# Patient Record
Sex: FEMALE | Race: WHITE | Employment: OTHER | URBAN - METROPOLITAN AREA
[De-identification: names, ages, dates, MRNs, and addresses within clinical notes are randomized per-mention and may not be internally consistent; named-entity substitution may affect disease eponyms.]

---

## 2017-01-13 RX ORDER — AMOXICILLIN 875 MG/1
875 TABLET, COATED ORAL 2 TIMES DAILY
Qty: 20 TABLET | Refills: 0 | Status: SHIPPED | OUTPATIENT
Start: 2017-01-13 | End: 2017-01-23

## 2017-03-08 RX ORDER — FOLIC ACID 1 MG/1
TABLET ORAL
Qty: 180 TABLET | Refills: 0 | Status: SHIPPED | OUTPATIENT
Start: 2017-03-08 | End: 2017-06-05 | Stop reason: SDUPTHER

## 2017-03-08 RX ORDER — LEVOTHYROXINE SODIUM 0.05 MG/1
TABLET ORAL
Qty: 90 TABLET | Refills: 0 | Status: SHIPPED | OUTPATIENT
Start: 2017-03-08 | End: 2017-06-05 | Stop reason: SDUPTHER

## 2017-03-08 RX ORDER — ISOSORBIDE MONONITRATE 30 MG/1
TABLET, EXTENDED RELEASE ORAL
Qty: 30 TABLET | Refills: 6 | Status: SHIPPED | OUTPATIENT
Start: 2017-03-08 | End: 2017-10-02 | Stop reason: SDUPTHER

## 2017-03-16 ENCOUNTER — HOSPITAL ENCOUNTER (OUTPATIENT)
Dept: OTHER | Age: 82
Discharge: HOME | End: 2017-03-16
Attending: FAMILY MEDICINE | Admitting: FAMILY MEDICINE

## 2017-03-16 ENCOUNTER — OFFICE VISIT (OUTPATIENT)
Dept: CARDIOLOGY CLINIC | Age: 82
End: 2017-03-16

## 2017-03-16 VITALS
HEART RATE: 67 BPM | DIASTOLIC BLOOD PRESSURE: 60 MMHG | HEIGHT: 60 IN | BODY MASS INDEX: 28.03 KG/M2 | WEIGHT: 142.8 LBS | SYSTOLIC BLOOD PRESSURE: 118 MMHG

## 2017-03-16 DIAGNOSIS — R06.02 SOB (SHORTNESS OF BREATH): Primary | ICD-10-CM

## 2017-03-16 DIAGNOSIS — R06.09 DYSPNEA ON EXERTION: ICD-10-CM

## 2017-03-16 DIAGNOSIS — E78.2 MIXED HYPERLIPIDEMIA: ICD-10-CM

## 2017-03-16 DIAGNOSIS — I25.10 CORONARY ARTERY DISEASE INVOLVING NATIVE HEART WITHOUT ANGINA PECTORIS, UNSPECIFIED VESSEL OR LESION TYPE: ICD-10-CM

## 2017-03-16 DIAGNOSIS — I10 ESSENTIAL HYPERTENSION: ICD-10-CM

## 2017-03-16 DIAGNOSIS — R06.02 SOB (SHORTNESS OF BREATH): ICD-10-CM

## 2017-03-16 PROCEDURE — 99214 OFFICE O/P EST MOD 30 MIN: CPT | Performed by: INTERNAL MEDICINE

## 2017-03-17 PROBLEM — R06.09 DYSPNEA ON EXERTION: Status: ACTIVE | Noted: 2017-03-17

## 2017-03-18 LAB
B-TYPE NATRIURETIC PEPTIDE: 155 PG/ML
BUN / CREAT RATIO: 22 (CALC) (ref 6–22)
BUN BLDV-MCNC: 29 MG/DL (ref 7–25)
CALCIUM SERPL-MCNC: 9.5 MG/DL (ref 8.6–10.4)
CHLORIDE BLD-SCNC: 106 MMOL/L (ref 98–110)
CO2: 23 MMOL/L (ref 20–31)
COPY TO: NORMAL
CREAT SERPL-MCNC: 1.29 MG/DL (ref 0.6–0.88)
GFR AFRICAN AMERICAN: 44 ML/MIN/1.73M2
GFR SERPL CREATININE-BSD FRML MDRD: 38 ML/MIN/1.73M2
GLUCOSE BLD-MCNC: 109 MG/DL (ref 65–99)
POTASSIUM SERPL-SCNC: 4.5 MMOL/L (ref 3.5–5.3)
SODIUM BLD-SCNC: 139 MMOL/L (ref 135–146)

## 2017-03-27 ENCOUNTER — TELEPHONE (OUTPATIENT)
Dept: CARDIOLOGY CLINIC | Age: 82
End: 2017-03-27

## 2017-04-12 ENCOUNTER — HOSPITAL ENCOUNTER (OUTPATIENT)
Dept: NON INVASIVE DIAGNOSTICS | Age: 82
Discharge: HOME | End: 2017-04-12
Attending: INTERNAL MEDICINE | Admitting: INTERNAL MEDICINE

## 2017-04-12 DIAGNOSIS — R06.02 SHORTNESS OF BREATH: ICD-10-CM

## 2017-04-12 LAB
LV EF: 58 %
LVEF MODALITY: NORMAL

## 2017-04-17 ENCOUNTER — TELEPHONE (OUTPATIENT)
Dept: CARDIOLOGY CLINIC | Age: 82
End: 2017-04-17

## 2017-04-18 ENCOUNTER — TELEPHONE (OUTPATIENT)
Dept: CARDIOLOGY CLINIC | Age: 82
End: 2017-04-18

## 2017-04-18 DIAGNOSIS — R06.02 SOB (SHORTNESS OF BREATH): Primary | ICD-10-CM

## 2017-04-28 ENCOUNTER — HOSPITAL ENCOUNTER (OUTPATIENT)
Dept: PULMONOLOGY | Age: 82
Discharge: HOME | End: 2017-04-28
Attending: INTERNAL MEDICINE | Admitting: INTERNAL MEDICINE

## 2017-04-28 DIAGNOSIS — R06.02 SHORTNESS OF BREATH: ICD-10-CM

## 2017-05-02 ENCOUNTER — OFFICE VISIT (OUTPATIENT)
Dept: PULMONOLOGY | Age: 82
End: 2017-05-02

## 2017-05-02 VITALS
WEIGHT: 143 LBS | SYSTOLIC BLOOD PRESSURE: 124 MMHG | BODY MASS INDEX: 28.07 KG/M2 | HEIGHT: 60 IN | HEART RATE: 79 BPM | RESPIRATION RATE: 16 BRPM | OXYGEN SATURATION: 94 % | DIASTOLIC BLOOD PRESSURE: 64 MMHG

## 2017-05-02 DIAGNOSIS — R93.89 ABNORMAL CXR: ICD-10-CM

## 2017-05-02 DIAGNOSIS — R09.02 EXERCISE HYPOXEMIA: ICD-10-CM

## 2017-05-02 DIAGNOSIS — R06.09 DOE (DYSPNEA ON EXERTION): Primary | ICD-10-CM

## 2017-05-02 PROCEDURE — 99204 OFFICE O/P NEW MOD 45 MIN: CPT | Performed by: INTERNAL MEDICINE

## 2017-05-02 PROCEDURE — 94620 6 MIN WALK TEST: CPT | Performed by: INTERNAL MEDICINE

## 2017-05-10 ENCOUNTER — HOSPITAL ENCOUNTER (OUTPATIENT)
Dept: CT IMAGING | Age: 82
Discharge: HOME | End: 2017-05-10
Attending: INTERNAL MEDICINE | Admitting: INTERNAL MEDICINE

## 2017-05-10 DIAGNOSIS — R93.89 ABNORMAL CXR: ICD-10-CM

## 2017-05-10 DIAGNOSIS — R06.09 DOE (DYSPNEA ON EXERTION): ICD-10-CM

## 2017-05-10 DIAGNOSIS — R06.09 OTHER FORMS OF DYSPNEA: ICD-10-CM

## 2017-06-05 RX ORDER — FOLIC ACID 1 MG/1
TABLET ORAL
Qty: 180 TABLET | Refills: 0 | Status: SHIPPED | OUTPATIENT
Start: 2017-06-05 | End: 2017-09-01 | Stop reason: SDUPTHER

## 2017-06-05 RX ORDER — LEVOTHYROXINE SODIUM 0.05 MG/1
TABLET ORAL
Qty: 90 TABLET | Refills: 0 | Status: SHIPPED | OUTPATIENT
Start: 2017-06-05 | End: 2017-09-01 | Stop reason: SDUPTHER

## 2017-06-08 ENCOUNTER — OFFICE VISIT (OUTPATIENT)
Dept: PULMONOLOGY | Age: 82
End: 2017-06-08

## 2017-06-08 VITALS
SYSTOLIC BLOOD PRESSURE: 116 MMHG | WEIGHT: 139 LBS | HEIGHT: 60 IN | RESPIRATION RATE: 18 BRPM | DIASTOLIC BLOOD PRESSURE: 70 MMHG | BODY MASS INDEX: 27.29 KG/M2

## 2017-06-08 DIAGNOSIS — R09.02 EXERCISE HYPOXEMIA: ICD-10-CM

## 2017-06-08 DIAGNOSIS — J84.112 IPF (IDIOPATHIC PULMONARY FIBROSIS) (HCC): Primary | ICD-10-CM

## 2017-06-08 PROCEDURE — 99214 OFFICE O/P EST MOD 30 MIN: CPT | Performed by: INTERNAL MEDICINE

## 2017-06-12 ENCOUNTER — TELEPHONE (OUTPATIENT)
Dept: FAMILY MEDICINE CLINIC | Age: 82
End: 2017-06-12

## 2017-06-12 DIAGNOSIS — E78.2 MIXED HYPERLIPIDEMIA: ICD-10-CM

## 2017-06-12 DIAGNOSIS — I10 ESSENTIAL HYPERTENSION: ICD-10-CM

## 2017-06-12 DIAGNOSIS — E03.9 HYPOTHYROIDISM, UNSPECIFIED TYPE: Primary | Chronic | ICD-10-CM

## 2017-06-12 PROBLEM — J84.112 IPF (IDIOPATHIC PULMONARY FIBROSIS) (HCC): Status: ACTIVE | Noted: 2017-06-12

## 2017-06-12 PROBLEM — R09.02 EXERCISE HYPOXEMIA: Status: ACTIVE | Noted: 2017-06-12

## 2017-06-15 LAB
A/G RATIO: 1.4 (CALC) (ref 1–2.5)
ALBUMIN SERPL-MCNC: 3.9 G/DL (ref 3.6–5.1)
ALP BLD-CCNC: 81 U/L (ref 33–130)
ALT SERPL-CCNC: 8 U/L (ref 6–29)
AST SERPL-CCNC: 14 U/L (ref 10–35)
BILIRUB SERPL-MCNC: 0.4 MG/DL (ref 0.2–1.2)
BUN / CREAT RATIO: 18 (CALC) (ref 6–22)
BUN BLDV-MCNC: 23 MG/DL (ref 7–25)
CALCIUM SERPL-MCNC: 9.2 MG/DL (ref 8.6–10.4)
CHLORIDE BLD-SCNC: 107 MMOL/L (ref 98–110)
CHOLESTEROL: 124 MG/DL (CALC)
CO2: 24 MMOL/L (ref 20–31)
CREAT SERPL-MCNC: 1.29 MG/DL (ref 0.6–0.88)
GFR AFRICAN AMERICAN: 44 ML/MIN/1.73M2
GFR SERPL CREATININE-BSD FRML MDRD: 38 ML/MIN/1.73M2
GLOBULIN: 2.7 G/DL (CALC) (ref 1.9–3.7)
GLUCOSE BLD-MCNC: 104 MG/DL (ref 65–99)
POTASSIUM SERPL-SCNC: 4.3 MMOL/L (ref 3.5–5.3)
SODIUM BLD-SCNC: 140 MMOL/L (ref 135–146)
TOTAL CK: 54 U/L (ref 29–143)
TOTAL PROTEIN: 6.6 G/DL (ref 6.1–8.1)

## 2017-06-20 ENCOUNTER — TELEPHONE (OUTPATIENT)
Dept: PULMONOLOGY | Age: 82
End: 2017-06-20

## 2017-06-22 ENCOUNTER — OFFICE VISIT (OUTPATIENT)
Dept: FAMILY MEDICINE CLINIC | Age: 82
End: 2017-06-22

## 2017-06-22 VITALS
HEIGHT: 60 IN | HEART RATE: 73 BPM | WEIGHT: 144.2 LBS | SYSTOLIC BLOOD PRESSURE: 120 MMHG | DIASTOLIC BLOOD PRESSURE: 70 MMHG | BODY MASS INDEX: 28.31 KG/M2 | OXYGEN SATURATION: 96 %

## 2017-06-22 DIAGNOSIS — I10 ESSENTIAL HYPERTENSION: ICD-10-CM

## 2017-06-22 DIAGNOSIS — E78.2 MIXED HYPERLIPIDEMIA: ICD-10-CM

## 2017-06-22 DIAGNOSIS — Z23 NEED FOR PROPHYLACTIC VACCINATION AGAINST DIPHTHERIA-TETANUS-PERTUSSIS (DTP): Primary | ICD-10-CM

## 2017-06-22 DIAGNOSIS — J84.112 IPF (IDIOPATHIC PULMONARY FIBROSIS) (HCC): ICD-10-CM

## 2017-06-22 DIAGNOSIS — I25.10 CORONARY ARTERY DISEASE INVOLVING NATIVE HEART WITHOUT ANGINA PECTORIS, UNSPECIFIED VESSEL OR LESION TYPE: ICD-10-CM

## 2017-06-22 PROBLEM — R06.09 DYSPNEA ON EXERTION: Status: RESOLVED | Noted: 2017-03-17 | Resolved: 2017-06-22

## 2017-06-22 PROCEDURE — 99214 OFFICE O/P EST MOD 30 MIN: CPT | Performed by: FAMILY MEDICINE

## 2017-06-22 ASSESSMENT — ENCOUNTER SYMPTOMS
ABDOMINAL PAIN: 0
HEMOPTYSIS: 0
SORE THROAT: 0
ORTHOPNEA: 0
VOMITING: 0
SPUTUM PRODUCTION: 0
SWOLLEN GLANDS: 0
SHORTNESS OF BREATH: 1
WHEEZING: 0
RHINORRHEA: 0

## 2017-07-03 RX ORDER — LOSARTAN POTASSIUM 100 MG/1
TABLET ORAL
Qty: 90 TABLET | Refills: 1 | Status: SHIPPED | OUTPATIENT
Start: 2017-07-03 | End: 2017-12-28 | Stop reason: SDUPTHER

## 2017-07-10 RX ORDER — ROSUVASTATIN CALCIUM 20 MG/1
TABLET, COATED ORAL
Qty: 60 TABLET | Refills: 2 | Status: SHIPPED | OUTPATIENT
Start: 2017-07-10 | End: 2017-11-27 | Stop reason: SDUPTHER

## 2017-08-18 ENCOUNTER — HOSPITAL ENCOUNTER (OUTPATIENT)
Dept: MAMMOGRAPHY | Age: 82
Discharge: HOME | End: 2017-08-18
Attending: FAMILY MEDICINE | Admitting: FAMILY MEDICINE

## 2017-08-18 DIAGNOSIS — Z12.31 VISIT FOR SCREENING MAMMOGRAM: ICD-10-CM

## 2017-08-25 ENCOUNTER — HOSPITAL ENCOUNTER (OUTPATIENT)
Dept: MAMMOGRAPHY | Age: 82
Discharge: HOME | End: 2017-08-25
Attending: FAMILY MEDICINE | Admitting: FAMILY MEDICINE

## 2017-08-25 DIAGNOSIS — R92.8 ABNORMAL FINDING ON MAMMOGRAPHY: ICD-10-CM

## 2017-08-25 DIAGNOSIS — R92.8 ABNORMAL MAMMOGRAM: ICD-10-CM

## 2017-08-29 ENCOUNTER — HOSPITAL ENCOUNTER (OUTPATIENT)
Dept: ULTRASOUND IMAGING | Age: 82
Discharge: HOME | End: 2017-08-29
Attending: FAMILY MEDICINE | Admitting: FAMILY MEDICINE

## 2017-08-29 DIAGNOSIS — R93.89 ABNORMAL ULTRASOUND: ICD-10-CM

## 2017-09-01 RX ORDER — LEVOTHYROXINE SODIUM 0.05 MG/1
TABLET ORAL
Qty: 90 TABLET | Refills: 0 | Status: SHIPPED | OUTPATIENT
Start: 2017-09-01 | End: 2017-12-01 | Stop reason: SDUPTHER

## 2017-09-01 RX ORDER — FOLIC ACID 1 MG/1
TABLET ORAL
Qty: 180 TABLET | Refills: 0 | Status: SHIPPED | OUTPATIENT
Start: 2017-09-01 | End: 2017-12-01 | Stop reason: SDUPTHER

## 2017-09-07 ENCOUNTER — TELEPHONE (OUTPATIENT)
Dept: PULMONOLOGY | Age: 82
End: 2017-09-07

## 2017-09-07 ENCOUNTER — OFFICE VISIT (OUTPATIENT)
Dept: CARDIOLOGY CLINIC | Age: 82
End: 2017-09-07

## 2017-09-07 VITALS
BODY MASS INDEX: 28.51 KG/M2 | DIASTOLIC BLOOD PRESSURE: 74 MMHG | HEART RATE: 76 BPM | WEIGHT: 146 LBS | SYSTOLIC BLOOD PRESSURE: 134 MMHG

## 2017-09-07 DIAGNOSIS — I25.10 CORONARY ARTERY DISEASE INVOLVING NATIVE HEART WITHOUT ANGINA PECTORIS, UNSPECIFIED VESSEL OR LESION TYPE: Primary | ICD-10-CM

## 2017-09-07 DIAGNOSIS — I10 ESSENTIAL HYPERTENSION: ICD-10-CM

## 2017-09-07 PROCEDURE — 99213 OFFICE O/P EST LOW 20 MIN: CPT | Performed by: INTERNAL MEDICINE

## 2017-09-21 ENCOUNTER — TELEPHONE (OUTPATIENT)
Dept: PULMONOLOGY | Age: 82
End: 2017-09-21

## 2017-09-21 DIAGNOSIS — J84.112 IPF (IDIOPATHIC PULMONARY FIBROSIS) (HCC): Primary | ICD-10-CM

## 2017-10-02 NOTE — TELEPHONE ENCOUNTER
Requested Prescriptions     Pending Prescriptions Disp Refills    isosorbide mononitrate (IMDUR) 30 MG extended release tablet [Pharmacy Med Name: ISOSORBIDE MN ER 30 MG TABLET] 90 tablet 5     Sig: TAKE ONE TABLET BY MOUTH DAILY

## 2017-10-05 RX ORDER — ISOSORBIDE MONONITRATE 30 MG/1
TABLET, EXTENDED RELEASE ORAL
Qty: 90 TABLET | Refills: 5 | Status: SHIPPED | OUTPATIENT
Start: 2017-10-05 | End: 2019-01-02 | Stop reason: SDUPTHER

## 2017-10-06 NOTE — TELEPHONE ENCOUNTER
Patient of Dr Ribera Offer;   Please see note below. She is taking Immodium (it comes with the Ofev because of the side effect), due to start of diarrhea.

## 2017-10-09 ENCOUNTER — TELEPHONE (OUTPATIENT)
Dept: PULMONOLOGY | Age: 82
End: 2017-10-09

## 2017-10-17 ENCOUNTER — TELEPHONE (OUTPATIENT)
Dept: FAMILY MEDICINE CLINIC | Age: 82
End: 2017-10-17

## 2017-11-08 NOTE — TELEPHONE ENCOUNTER
Received call from pharmacy--Rx did not go thru fax. They can only receive electronic fax. I routed Rx through [address] [place] [address] Rd.

## 2017-11-14 ENCOUNTER — OFFICE VISIT (OUTPATIENT)
Dept: PULMONOLOGY | Age: 82
End: 2017-11-14

## 2017-11-14 VITALS
DIASTOLIC BLOOD PRESSURE: 68 MMHG | BODY MASS INDEX: 28.07 KG/M2 | HEART RATE: 74 BPM | WEIGHT: 143 LBS | HEIGHT: 60 IN | RESPIRATION RATE: 16 BRPM | OXYGEN SATURATION: 94 % | SYSTOLIC BLOOD PRESSURE: 122 MMHG

## 2017-11-14 DIAGNOSIS — J84.112 IPF (IDIOPATHIC PULMONARY FIBROSIS) (HCC): Primary | ICD-10-CM

## 2017-11-14 PROCEDURE — G8598 ASA/ANTIPLAT THER USED: HCPCS | Performed by: INTERNAL MEDICINE

## 2017-11-14 PROCEDURE — G8484 FLU IMMUNIZE NO ADMIN: HCPCS | Performed by: INTERNAL MEDICINE

## 2017-11-14 PROCEDURE — 4040F PNEUMOC VAC/ADMIN/RCVD: CPT | Performed by: INTERNAL MEDICINE

## 2017-11-14 PROCEDURE — 99214 OFFICE O/P EST MOD 30 MIN: CPT | Performed by: INTERNAL MEDICINE

## 2017-11-14 PROCEDURE — 1123F ACP DISCUSS/DSCN MKR DOCD: CPT | Performed by: INTERNAL MEDICINE

## 2017-11-14 PROCEDURE — G8419 CALC BMI OUT NRM PARAM NOF/U: HCPCS | Performed by: INTERNAL MEDICINE

## 2017-11-14 PROCEDURE — 1036F TOBACCO NON-USER: CPT | Performed by: INTERNAL MEDICINE

## 2017-11-14 PROCEDURE — 1090F PRES/ABSN URINE INCON ASSESS: CPT | Performed by: INTERNAL MEDICINE

## 2017-11-14 PROCEDURE — G8399 PT W/DXA RESULTS DOCUMENT: HCPCS | Performed by: INTERNAL MEDICINE

## 2017-11-14 PROCEDURE — G8427 DOCREV CUR MEDS BY ELIG CLIN: HCPCS | Performed by: INTERNAL MEDICINE

## 2017-11-14 NOTE — PROGRESS NOTES
[facility]    Outpatient Follow Up Note    Subjective:   CHIEF COMPLAINT / HPI:     The patient is 80 y.o. female who presents today for follow up of IPF. She has now been on Ofev for approximately 6-8 weeks. She initially had issues with diarrhea and some nausea and anorexia. I had her stop the Ofev for a week and restart at a lower dose, 150 mg daily instead of twice a day. She eats it with her biggest meal of the day which is lunch. She has done much better and has no side effects. Her dyspnea exertion is unchanged and really is related to her activity level. However she does not have daily dyspnea on exertion. She is not using her portable oxygen with exertion consistently.   She is asking about a portable oxygen concentrator    Past Medical History:    Past Medical History:   Diagnosis Date    Bilateral cataracts 9/17/2015    Cholelithiasis     Coronary artery disease     Hyperlipidemia     Hypertension     Hypothyroid 4/11/2014    IPF (idiopathic pulmonary fibrosis) ([facility] [address] 6/12/2017    Thyroid nodule        Social History:    History   Smoking Status    Former Smoker    Quit date: 8/13/1985   Smokeless Tobacco    Never Used       Current Medications:  Current Outpatient Prescriptions on File Prior to Visit   Medication Sig Dispense Refill    Nintedanib Esylate 150 MG CAPS Take 150 mg by mouth daily 30 capsule 3    isosorbide mononitrate (IMDUR) 30 MG extended release tablet TAKE ONE TABLET BY MOUTH DAILY 90 tablet 5    metoprolol tartrate (LOPRESSOR) 25 MG tablet TAKE ONE AND ONE-HALF (1 & 1/2) TABLET BY MOUTH TWO TIMES A DAY 90 tablet 3    levothyroxine (SYNTHROID) 50 MCG tablet TAKE ONE TABLET BY MOUTH DAILY 90 tablet 0    folic acid (FOLVITE) 1 MG tablet TAKE ONE TABLET BY MOUTH TWICE A  tablet 0    rosuvastatin (CRESTOR) 20 MG tablet TAKE ONE TABLET BY MOUTH ONCE NIGHTLY 60 tablet 2    losartan (COZAAR) 100 MG tablet TAKE ONE TABLET BY calcifications are noted.  No significant pericardial effusion is   appreciated.  Large hiatal hernia is present.       HRCT Findings/Lungs/pleura: Routine images through the lung fields   demonstrate no focal dense consolidation.  High-resolution images through the   lungs demonstrate diffuse moderate bronchiectasis involving upper lobes and   lower lobes and right middle lobe.  There are scattered areas of nonspecific   ground-glass.  Peripheral subpleural reticular lines are noted these are   present bilaterally.  With a lower lobe predominance, there are areas of   honeycomb lung.  Significant pattern of air trapping not appreciated   expiration images.       Upper Abdomen: Limited evaluation without acute finding.       Soft Tissues/Bones: Moderate diffuse degenerative changes are noted about   spine.           Impression   Extensive changes are noted bilaterally suggestive of interstitial lung   disease. Iver Hardin is most suggestive of usual interstitial pneumonitis   pattern.  Clinical correlation and follow-up is recommended.       Heavy coronary artery calcifications are noted.       Large hiatal hernia is present. Last PFTs:  DATE OF STUDY:  04/28/2017     Forced vital capacity mildly reduced expiratory flow rates and FEV1 to FVC  ratio normal.  No change after bronchodilator. Total lung capacity and FRC  moderately reduced, RV severely reduced. Single breath diffusion capacity  moderately reduced.     IMPRESSION:  Mild restrictive ventilatory defect. Findings may be consistent  with a neuromuscular chest wall, pleural or parenchymal disorder. Assessment:     1. IPF (idiopathic pulmonary fibrosis) ([address]  Hepatic Function Panel       Plan:     1. Continue Ofev 150 mg  2. LFTs November 4 were normal.  Next recheck beginning of February  3. I've written an order for a portable oxygen concentrator  4. I've recommended that she get a pulse oximeter and monitor oxygen saturation with exertion.

## 2017-12-15 ENCOUNTER — TELEPHONE (OUTPATIENT)
Dept: PULMONOLOGY | Age: 82
End: 2017-12-15

## 2017-12-15 NOTE — TELEPHONE ENCOUNTER
I have called [facility] to start PA.      Spoke to Thaddeus Pittman to get a continuation of therapy PA for     Ref: [id number]  APPROVED THROUGH 12/31/2018

## 2017-12-18 ENCOUNTER — TELEPHONE (OUTPATIENT)
Dept: PULMONOLOGY | Age: 82
End: 2017-12-18

## 2017-12-26 ENCOUNTER — TELEPHONE (OUTPATIENT)
Dept: FAMILY MEDICINE CLINIC | Age: 82
End: 2017-12-26

## 2017-12-26 NOTE — TELEPHONE ENCOUNTER
[address] says tips of her fingers are turning blue  PT says it's dur to poor circulation  Also having itching allover her body  Breaks out in a rash,  Breaks out in different places  Thinks it's a reaction to one of her meds    Wants to be seen    PT last seen 6/22/17

## 2017-12-27 NOTE — TELEPHONE ENCOUNTER
As long as there is no pain keep appt next week  Verbal instructions and authorization given per Dr. Calin Paulino and notified pt

## 2018-01-03 ENCOUNTER — OFFICE VISIT (OUTPATIENT)
Dept: FAMILY MEDICINE CLINIC | Age: 83
End: 2018-01-03

## 2018-01-03 VITALS
HEIGHT: 60 IN | OXYGEN SATURATION: 95 % | DIASTOLIC BLOOD PRESSURE: 80 MMHG | WEIGHT: 142.7 LBS | HEART RATE: 72 BPM | SYSTOLIC BLOOD PRESSURE: 120 MMHG | BODY MASS INDEX: 28.02 KG/M2

## 2018-01-03 DIAGNOSIS — N18.2 STAGE 2 CHRONIC KIDNEY DISEASE: ICD-10-CM

## 2018-01-03 DIAGNOSIS — L29.9 ITCHING: ICD-10-CM

## 2018-01-03 DIAGNOSIS — E78.2 MIXED HYPERLIPIDEMIA: ICD-10-CM

## 2018-01-03 DIAGNOSIS — J84.112 IPF (IDIOPATHIC PULMONARY FIBROSIS) (HCC): ICD-10-CM

## 2018-01-03 LAB
ANION GAP SERPL CALCULATED.3IONS-SCNC: 15 MMOL/L (ref 3–16)
BASOPHILS ABSOLUTE: 0.1 K/UL (ref 0–0.2)
BASOPHILS RELATIVE PERCENT: 1.1 %
BUN BLDV-MCNC: 19 MG/DL (ref 7–20)
CALCIUM SERPL-MCNC: 9.9 MG/DL (ref 8.3–10.6)
CHLORIDE BLD-SCNC: 101 MMOL/L (ref 99–110)
CO2: 25 MMOL/L (ref 21–32)
CREAT SERPL-MCNC: 1.1 MG/DL (ref 0.6–1.2)
EOSINOPHILS ABSOLUTE: 0.3 K/UL (ref 0–0.6)
EOSINOPHILS RELATIVE PERCENT: 4 %
GFR AFRICAN AMERICAN: 57
GFR NON-AFRICAN AMERICAN: 47
GLUCOSE BLD-MCNC: 89 MG/DL (ref 70–99)
HCT VFR BLD CALC: 38.9 % (ref 36–48)
HEMOGLOBIN: 12.8 G/DL (ref 12–16)
LYMPHOCYTES ABSOLUTE: 1.3 K/UL (ref 1–5.1)
LYMPHOCYTES RELATIVE PERCENT: 17 %
MCH RBC QN AUTO: 30.9 PG (ref 26–34)
MCHC RBC AUTO-ENTMCNC: 32.9 G/DL (ref 31–36)
MCV RBC AUTO: 94.1 FL (ref 80–100)
MONOCYTES ABSOLUTE: 0.6 K/UL (ref 0–1.3)
MONOCYTES RELATIVE PERCENT: 8.2 %
NEUTROPHILS ABSOLUTE: 5.3 K/UL (ref 1.7–7.7)
NEUTROPHILS RELATIVE PERCENT: 69.7 %
PDW BLD-RTO: 14.6 % (ref 12.4–15.4)
PLATELET # BLD: 318 K/UL (ref 135–450)
PMV BLD AUTO: 8.5 FL (ref 5–10.5)
POTASSIUM SERPL-SCNC: 5.2 MMOL/L (ref 3.5–5.1)
RBC # BLD: 4.13 M/UL (ref 4–5.2)
SODIUM BLD-SCNC: 141 MMOL/L (ref 136–145)
WBC # BLD: 7.7 K/UL (ref 4–11)

## 2018-01-03 PROCEDURE — 4040F PNEUMOC VAC/ADMIN/RCVD: CPT | Performed by: FAMILY MEDICINE

## 2018-01-03 PROCEDURE — G8510 SCR DEP NEG, NO PLAN REQD: HCPCS | Performed by: FAMILY MEDICINE

## 2018-01-03 PROCEDURE — G8598 ASA/ANTIPLAT THER USED: HCPCS | Performed by: FAMILY MEDICINE

## 2018-01-03 PROCEDURE — 1123F ACP DISCUSS/DSCN MKR DOCD: CPT | Performed by: FAMILY MEDICINE

## 2018-01-03 PROCEDURE — G8419 CALC BMI OUT NRM PARAM NOF/U: HCPCS | Performed by: FAMILY MEDICINE

## 2018-01-03 PROCEDURE — G8484 FLU IMMUNIZE NO ADMIN: HCPCS | Performed by: FAMILY MEDICINE

## 2018-01-03 PROCEDURE — G8399 PT W/DXA RESULTS DOCUMENT: HCPCS | Performed by: FAMILY MEDICINE

## 2018-01-03 PROCEDURE — 99213 OFFICE O/P EST LOW 20 MIN: CPT | Performed by: FAMILY MEDICINE

## 2018-01-03 PROCEDURE — G8427 DOCREV CUR MEDS BY ELIG CLIN: HCPCS | Performed by: FAMILY MEDICINE

## 2018-01-03 PROCEDURE — 3288F FALL RISK ASSESSMENT DOCD: CPT | Performed by: FAMILY MEDICINE

## 2018-01-03 PROCEDURE — 1090F PRES/ABSN URINE INCON ASSESS: CPT | Performed by: FAMILY MEDICINE

## 2018-01-03 PROCEDURE — 1036F TOBACCO NON-USER: CPT | Performed by: FAMILY MEDICINE

## 2018-01-03 ASSESSMENT — ENCOUNTER SYMPTOMS
SHORTNESS OF BREATH: 1
SORE THROAT: 0
RHINORRHEA: 0
DIARRHEA: 0
VOMITING: 0
NAIL CHANGES: 0
COUGH: 0
EYE PAIN: 0

## 2018-01-03 ASSESSMENT — PATIENT HEALTH QUESTIONNAIRE - PHQ9
1. LITTLE INTEREST OR PLEASURE IN DOING THINGS: 0
SUM OF ALL RESPONSES TO PHQ QUESTIONS 1-9: 0
2. FEELING DOWN, DEPRESSED OR HOPELESS: 0
SUM OF ALL RESPONSES TO PHQ9 QUESTIONS 1 & 2: 0

## 2018-01-19 ENCOUNTER — TELEPHONE (OUTPATIENT)
Dept: FAMILY MEDICINE CLINIC | Age: 83
End: 2018-01-19

## 2018-01-23 ENCOUNTER — TELEPHONE (OUTPATIENT)
Dept: FAMILY MEDICINE CLINIC | Age: 83
End: 2018-01-23

## 2018-01-26 ENCOUNTER — OFFICE VISIT (OUTPATIENT)
Dept: FAMILY MEDICINE CLINIC | Age: 83
End: 2018-01-26

## 2018-01-26 VITALS
SYSTOLIC BLOOD PRESSURE: 120 MMHG | BODY MASS INDEX: 28.11 KG/M2 | DIASTOLIC BLOOD PRESSURE: 80 MMHG | HEART RATE: 73 BPM | HEIGHT: 60 IN | WEIGHT: 143.2 LBS | OXYGEN SATURATION: 96 %

## 2018-01-26 DIAGNOSIS — L29.9 ITCHING: ICD-10-CM

## 2018-01-26 PROCEDURE — G8419 CALC BMI OUT NRM PARAM NOF/U: HCPCS | Performed by: FAMILY MEDICINE

## 2018-01-26 PROCEDURE — G8598 ASA/ANTIPLAT THER USED: HCPCS | Performed by: FAMILY MEDICINE

## 2018-01-26 PROCEDURE — G8399 PT W/DXA RESULTS DOCUMENT: HCPCS | Performed by: FAMILY MEDICINE

## 2018-01-26 PROCEDURE — 1090F PRES/ABSN URINE INCON ASSESS: CPT | Performed by: FAMILY MEDICINE

## 2018-01-26 PROCEDURE — G8484 FLU IMMUNIZE NO ADMIN: HCPCS | Performed by: FAMILY MEDICINE

## 2018-01-26 PROCEDURE — 1036F TOBACCO NON-USER: CPT | Performed by: FAMILY MEDICINE

## 2018-01-26 PROCEDURE — 1123F ACP DISCUSS/DSCN MKR DOCD: CPT | Performed by: FAMILY MEDICINE

## 2018-01-26 PROCEDURE — 99213 OFFICE O/P EST LOW 20 MIN: CPT | Performed by: FAMILY MEDICINE

## 2018-01-26 PROCEDURE — G8427 DOCREV CUR MEDS BY ELIG CLIN: HCPCS | Performed by: FAMILY MEDICINE

## 2018-01-26 PROCEDURE — 4040F PNEUMOC VAC/ADMIN/RCVD: CPT | Performed by: FAMILY MEDICINE

## 2018-01-26 ASSESSMENT — ENCOUNTER SYMPTOMS
EYE PAIN: 0
DIARRHEA: 0
VOMITING: 0
SHORTNESS OF BREATH: 0
COUGH: 0
SORE THROAT: 0
RHINORRHEA: 0
NAIL CHANGES: 0

## 2018-01-26 NOTE — PROGRESS NOTES
Subjective:      Patient ID: Elena Tolentino is a 80 y.o. female. Rash   This is a chronic problem. The current episode started 1 to 4 weeks ago. The problem is unchanged. The rash is diffuse. Rash characteristics: ? meds. Pertinent negatives include no anorexia, congestion, cough, diarrhea, eye pain, facial edema, fatigue, fever, joint pain, nail changes, rhinorrhea, shortness of breath, sore throat or vomiting. Past treatments include nothing. The treatment provided no relief. There is no history of allergies, asthma, eczema or varicella. Review of Systems   Constitutional: Negative for fatigue and fever. HENT: Negative for congestion, rhinorrhea and sore throat. Eyes: Negative for pain. Respiratory: Negative for cough and shortness of breath. Gastrointestinal: Negative for anorexia, diarrhea and vomiting. Musculoskeletal: Negative for joint pain. Skin: Positive for rash. Negative for nail changes. Objective:   Physical Exam   Constitutional: She is oriented to person, place, and time. She appears well-developed and well-nourished. No distress. HENT:   Mouth/Throat: Oropharynx is clear and moist.   Eyes: Conjunctivae are normal. Pupils are equal, round, and reactive to light. Neck: No JVD present. No tracheal deviation present. No thyromegaly present. Cardiovascular: Normal rate, regular rhythm, normal heart sounds and intact distal pulses. Exam reveals no gallop. No murmur heard. Pulmonary/Chest: Effort normal and breath sounds normal. No stridor. No respiratory distress. She has no wheezes. She has no rales. She exhibits no tenderness. Abdominal: Soft. Bowel sounds are normal. She exhibits no distension and no mass. There is no tenderness. Musculoskeletal: She exhibits no edema or tenderness. Lymphadenopathy:     She has no cervical adenopathy. Neurological: She is alert and oriented to person, place, and time. She displays normal reflexes. No cranial nerve deficit.

## 2018-02-15 ENCOUNTER — OFFICE VISIT (OUTPATIENT)
Dept: PULMONOLOGY | Age: 83
End: 2018-02-15

## 2018-02-15 VITALS
DIASTOLIC BLOOD PRESSURE: 72 MMHG | WEIGHT: 141 LBS | SYSTOLIC BLOOD PRESSURE: 120 MMHG | OXYGEN SATURATION: 95 % | RESPIRATION RATE: 16 BRPM | HEART RATE: 72 BPM | BODY MASS INDEX: 27.68 KG/M2 | HEIGHT: 60 IN

## 2018-02-15 DIAGNOSIS — R09.02 EXERCISE HYPOXEMIA: ICD-10-CM

## 2018-02-15 DIAGNOSIS — Z51.81 THERAPEUTIC DRUG MONITORING: ICD-10-CM

## 2018-02-15 DIAGNOSIS — J84.112 IPF (IDIOPATHIC PULMONARY FIBROSIS) (HCC): Primary | ICD-10-CM

## 2018-02-15 PROCEDURE — G8399 PT W/DXA RESULTS DOCUMENT: HCPCS | Performed by: INTERNAL MEDICINE

## 2018-02-15 PROCEDURE — 1036F TOBACCO NON-USER: CPT | Performed by: INTERNAL MEDICINE

## 2018-02-15 PROCEDURE — G8419 CALC BMI OUT NRM PARAM NOF/U: HCPCS | Performed by: INTERNAL MEDICINE

## 2018-02-15 PROCEDURE — G8484 FLU IMMUNIZE NO ADMIN: HCPCS | Performed by: INTERNAL MEDICINE

## 2018-02-15 PROCEDURE — 4040F PNEUMOC VAC/ADMIN/RCVD: CPT | Performed by: INTERNAL MEDICINE

## 2018-02-15 PROCEDURE — G8598 ASA/ANTIPLAT THER USED: HCPCS | Performed by: INTERNAL MEDICINE

## 2018-02-15 PROCEDURE — G8427 DOCREV CUR MEDS BY ELIG CLIN: HCPCS | Performed by: INTERNAL MEDICINE

## 2018-02-15 PROCEDURE — 1123F ACP DISCUSS/DSCN MKR DOCD: CPT | Performed by: INTERNAL MEDICINE

## 2018-02-15 PROCEDURE — 1090F PRES/ABSN URINE INCON ASSESS: CPT | Performed by: INTERNAL MEDICINE

## 2018-02-15 PROCEDURE — 99214 OFFICE O/P EST MOD 30 MIN: CPT | Performed by: INTERNAL MEDICINE

## 2018-03-22 ENCOUNTER — OFFICE VISIT (OUTPATIENT)
Dept: CARDIOLOGY CLINIC | Age: 83
End: 2018-03-22

## 2018-03-22 VITALS
OXYGEN SATURATION: 93 % | DIASTOLIC BLOOD PRESSURE: 80 MMHG | SYSTOLIC BLOOD PRESSURE: 140 MMHG | BODY MASS INDEX: 27.54 KG/M2 | HEART RATE: 72 BPM | WEIGHT: 141 LBS

## 2018-03-22 DIAGNOSIS — E78.5 HYPERLIPIDEMIA, UNSPECIFIED HYPERLIPIDEMIA TYPE: ICD-10-CM

## 2018-03-22 DIAGNOSIS — R06.02 SOB (SHORTNESS OF BREATH): ICD-10-CM

## 2018-03-22 DIAGNOSIS — J84.9 INTERSTITIAL LUNG DISEASE (HCC): ICD-10-CM

## 2018-03-22 DIAGNOSIS — I10 ESSENTIAL HYPERTENSION: Primary | ICD-10-CM

## 2018-03-22 DIAGNOSIS — I25.10 CAD IN NATIVE ARTERY: ICD-10-CM

## 2018-03-22 PROCEDURE — G8482 FLU IMMUNIZE ORDER/ADMIN: HCPCS | Performed by: INTERNAL MEDICINE

## 2018-03-22 PROCEDURE — 1123F ACP DISCUSS/DSCN MKR DOCD: CPT | Performed by: INTERNAL MEDICINE

## 2018-03-22 PROCEDURE — 99214 OFFICE O/P EST MOD 30 MIN: CPT | Performed by: INTERNAL MEDICINE

## 2018-03-22 PROCEDURE — G8427 DOCREV CUR MEDS BY ELIG CLIN: HCPCS | Performed by: INTERNAL MEDICINE

## 2018-03-22 PROCEDURE — 4040F PNEUMOC VAC/ADMIN/RCVD: CPT | Performed by: INTERNAL MEDICINE

## 2018-03-22 PROCEDURE — G8419 CALC BMI OUT NRM PARAM NOF/U: HCPCS | Performed by: INTERNAL MEDICINE

## 2018-03-22 PROCEDURE — G8399 PT W/DXA RESULTS DOCUMENT: HCPCS | Performed by: INTERNAL MEDICINE

## 2018-03-22 PROCEDURE — 1036F TOBACCO NON-USER: CPT | Performed by: INTERNAL MEDICINE

## 2018-03-22 PROCEDURE — G8598 ASA/ANTIPLAT THER USED: HCPCS | Performed by: INTERNAL MEDICINE

## 2018-03-22 PROCEDURE — 1090F PRES/ABSN URINE INCON ASSESS: CPT | Performed by: INTERNAL MEDICINE

## 2018-03-22 RX ORDER — NITROGLYCERIN 0.4 MG/1
0.4 TABLET SUBLINGUAL EVERY 5 MIN PRN
Qty: 25 TABLET | Refills: 3 | Status: SHIPPED | OUTPATIENT
Start: 2018-03-22 | End: 2019-10-10 | Stop reason: SDUPTHER

## 2018-03-22 ASSESSMENT — ENCOUNTER SYMPTOMS
COUGH: 0
CHEST TIGHTNESS: 0
SHORTNESS OF BREATH: 1
CHOKING: 0

## 2018-03-22 NOTE — PROGRESS NOTES
Negative for agitation, behavioral problems and confusion. All other systems reviewed and are negative. Objective:   Physical Exam   Constitutional: She is oriented to person, place, and time. She appears well-developed and well-nourished. No distress. HENT:   Head: Normocephalic and atraumatic. Eyes: Conjunctivae and EOM are normal. Right eye exhibits no discharge. Left eye exhibits no discharge. Neck: Normal range of motion. No JVD present. Cardiovascular: Normal rate, regular rhythm, S1 normal, S2 normal and normal heart sounds. Exam reveals no gallop. No murmur heard. Pulses:       Radial pulses are 2+ on the right side, and 2+ on the left side. Pulmonary/Chest: Effort normal. No respiratory distress. She has decreased breath sounds. She has no wheezes. She has no rales. Abdominal: Soft. Bowel sounds are normal. She exhibits no distension. There is no tenderness. Musculoskeletal: Normal range of motion. She exhibits no edema. Neurological: She is alert and oriented to person, place, and time. Skin: Skin is warm and dry. Psychiatric: She has a normal mood and affect. Her behavior is normal. Thought content normal.         Assessment:      1. Essential hypertension  nitroGLYCERIN (NITROSTAT) 0.4 MG SL tablet   2. Hyperlipidemia, unspecified hyperlipidemia type  nitroGLYCERIN (NITROSTAT) 0.4 MG SL tablet   3. CAD in native artery     4. Interstitial lung disease ([address]     5. SOB (shortness of breath)             Plan:      Sx appear stable. Sob with some activities but unchanged. Left arm pain isolated. Not recurring . No chest pain. Lipids per PCP. Reviewed previous records and testing. No changes. Continue to monitor. F/U 6 months.

## 2018-05-10 ENCOUNTER — OFFICE VISIT (OUTPATIENT)
Dept: PULMONOLOGY | Age: 83
End: 2018-05-10

## 2018-05-10 ENCOUNTER — TELEPHONE (OUTPATIENT)
Dept: FAMILY MEDICINE CLINIC | Age: 83
End: 2018-05-10

## 2018-05-10 VITALS
DIASTOLIC BLOOD PRESSURE: 78 MMHG | WEIGHT: 137 LBS | RESPIRATION RATE: 16 BRPM | HEIGHT: 60 IN | HEART RATE: 65 BPM | OXYGEN SATURATION: 94 % | SYSTOLIC BLOOD PRESSURE: 130 MMHG | BODY MASS INDEX: 26.9 KG/M2

## 2018-05-10 DIAGNOSIS — R09.02 EXERCISE HYPOXEMIA: ICD-10-CM

## 2018-05-10 DIAGNOSIS — J84.112 IPF (IDIOPATHIC PULMONARY FIBROSIS) (HCC): Primary | ICD-10-CM

## 2018-05-10 PROCEDURE — 99214 OFFICE O/P EST MOD 30 MIN: CPT | Performed by: INTERNAL MEDICINE

## 2018-05-10 PROCEDURE — 4040F PNEUMOC VAC/ADMIN/RCVD: CPT | Performed by: INTERNAL MEDICINE

## 2018-05-10 PROCEDURE — 1036F TOBACCO NON-USER: CPT | Performed by: INTERNAL MEDICINE

## 2018-05-10 PROCEDURE — G8598 ASA/ANTIPLAT THER USED: HCPCS | Performed by: INTERNAL MEDICINE

## 2018-05-10 PROCEDURE — 1090F PRES/ABSN URINE INCON ASSESS: CPT | Performed by: INTERNAL MEDICINE

## 2018-05-10 PROCEDURE — G8419 CALC BMI OUT NRM PARAM NOF/U: HCPCS | Performed by: INTERNAL MEDICINE

## 2018-05-10 PROCEDURE — 1123F ACP DISCUSS/DSCN MKR DOCD: CPT | Performed by: INTERNAL MEDICINE

## 2018-05-10 PROCEDURE — G8427 DOCREV CUR MEDS BY ELIG CLIN: HCPCS | Performed by: INTERNAL MEDICINE

## 2018-05-10 PROCEDURE — G8399 PT W/DXA RESULTS DOCUMENT: HCPCS | Performed by: INTERNAL MEDICINE

## 2018-06-05 ENCOUNTER — OFFICE VISIT (OUTPATIENT)
Dept: FAMILY MEDICINE CLINIC | Age: 83
End: 2018-06-05

## 2018-06-05 VITALS
HEIGHT: 60 IN | BODY MASS INDEX: 27.03 KG/M2 | SYSTOLIC BLOOD PRESSURE: 120 MMHG | DIASTOLIC BLOOD PRESSURE: 80 MMHG | WEIGHT: 137.7 LBS | HEART RATE: 68 BPM | OXYGEN SATURATION: 95 %

## 2018-06-05 DIAGNOSIS — E78.5 HYPERLIPIDEMIA, UNSPECIFIED HYPERLIPIDEMIA TYPE: ICD-10-CM

## 2018-06-05 DIAGNOSIS — Z23 NEED FOR TDAP VACCINATION: ICD-10-CM

## 2018-06-05 DIAGNOSIS — I25.10 CORONARY ARTERY DISEASE INVOLVING NATIVE HEART WITHOUT ANGINA PECTORIS, UNSPECIFIED VESSEL OR LESION TYPE: ICD-10-CM

## 2018-06-05 DIAGNOSIS — Z23 NEED FOR SHINGLES VACCINE: ICD-10-CM

## 2018-06-05 DIAGNOSIS — L29.9 ITCHING: ICD-10-CM

## 2018-06-05 DIAGNOSIS — J84.112 IPF (IDIOPATHIC PULMONARY FIBROSIS) (HCC): ICD-10-CM

## 2018-06-05 DIAGNOSIS — I10 ESSENTIAL HYPERTENSION: Primary | ICD-10-CM

## 2018-06-05 DIAGNOSIS — E03.9 HYPOTHYROIDISM, UNSPECIFIED TYPE: Chronic | ICD-10-CM

## 2018-06-05 DIAGNOSIS — N18.2 STAGE 2 CHRONIC KIDNEY DISEASE: ICD-10-CM

## 2018-06-05 PROCEDURE — 4040F PNEUMOC VAC/ADMIN/RCVD: CPT | Performed by: FAMILY MEDICINE

## 2018-06-05 PROCEDURE — G8598 ASA/ANTIPLAT THER USED: HCPCS | Performed by: FAMILY MEDICINE

## 2018-06-05 PROCEDURE — 1123F ACP DISCUSS/DSCN MKR DOCD: CPT | Performed by: FAMILY MEDICINE

## 2018-06-05 PROCEDURE — G8419 CALC BMI OUT NRM PARAM NOF/U: HCPCS | Performed by: FAMILY MEDICINE

## 2018-06-05 PROCEDURE — 1090F PRES/ABSN URINE INCON ASSESS: CPT | Performed by: FAMILY MEDICINE

## 2018-06-05 PROCEDURE — 99214 OFFICE O/P EST MOD 30 MIN: CPT | Performed by: FAMILY MEDICINE

## 2018-06-05 PROCEDURE — G8427 DOCREV CUR MEDS BY ELIG CLIN: HCPCS | Performed by: FAMILY MEDICINE

## 2018-06-05 PROCEDURE — 1036F TOBACCO NON-USER: CPT | Performed by: FAMILY MEDICINE

## 2018-06-05 PROCEDURE — G8399 PT W/DXA RESULTS DOCUMENT: HCPCS | Performed by: FAMILY MEDICINE

## 2018-06-05 ASSESSMENT — ENCOUNTER SYMPTOMS
CHOKING: 0
ABDOMINAL DISTENTION: 0
NAUSEA: 0
DIARRHEA: 0
CHEST TIGHTNESS: 0
SINUS PRESSURE: 0
TROUBLE SWALLOWING: 0
ANAL BLEEDING: 0
EYE ITCHING: 0
SHORTNESS OF BREATH: 0
EYE REDNESS: 0
VOICE CHANGE: 0
SORE THROAT: 0
ABDOMINAL PAIN: 0
WHEEZING: 0
FACIAL SWELLING: 0
BLOOD IN STOOL: 0
BACK PAIN: 0
RHINORRHEA: 0
EYE DISCHARGE: 0
COUGH: 0
APNEA: 0
VOMITING: 0
STRIDOR: 0
COLOR CHANGE: 0
CONSTIPATION: 0
EYE PAIN: 0
RECTAL PAIN: 0
PHOTOPHOBIA: 0

## 2018-06-13 LAB
A/G RATIO: 1.2 (CALC) (ref 1–2.5)
ALBUMIN SERPL-MCNC: 3.8 G/DL (ref 3.6–5.1)
ALP BLD-CCNC: 82 U/L (ref 33–130)
ALT SERPL-CCNC: 8 U/L (ref 6–29)
AST SERPL-CCNC: 13 U/L (ref 10–35)
ATYPICAL LYMPHOCYTE RELATIVE PERCENT: NORMAL % (ref 0–10)
BAND NEUTROPHILS: NORMAL %
BANDED NEUTROPHILS ABSOLUTE COUNT: NORMAL CELLS/UL (ref 0–750)
BASOPHILS ABSOLUTE: 48 CELLS/UL (ref 0–200)
BASOPHILS RELATIVE PERCENT: 0.7 %
BILIRUB SERPL-MCNC: 0.4 MG/DL (ref 0.2–1.2)
BLASTS ABSOLUTE COUNT: NORMAL CELLS/UL
BLASTS RELATIVE PERCENT: NORMAL %
BUN / CREAT RATIO: 18 (CALC) (ref 6–22)
BUN BLDV-MCNC: 22 MG/DL (ref 7–25)
CALCIUM SERPL-MCNC: 9.6 MG/DL (ref 8.6–10.4)
CHLORIDE BLD-SCNC: 105 MMOL/L (ref 98–110)
CHOLESTEROL: 124 MG/DL (CALC)
CO2: 24 MMOL/L (ref 20–31)
COMMENT: NORMAL
COPY TO: NORMAL
CREAT SERPL-MCNC: 1.21 MG/DL (ref 0.6–0.88)
EOSINOPHILS ABSOLUTE: 320 CELLS/UL (ref 15–500)
EOSINOPHILS RELATIVE PERCENT: 4.7 %
GFR AFRICAN AMERICAN: 48 ML/MIN/1.73M2
GFR SERPL CREATININE-BSD FRML MDRD: 41 ML/MIN/1.73M2
GLOBULIN: 3.1 G/DL (CALC) (ref 1.9–3.7)
GLUCOSE BLD-MCNC: 105 MG/DL (ref 65–99)
HCT VFR BLD CALC: 38.2 % (ref 35–45)
HEMOGLOBIN: 12.7 G/DL (ref 11.7–15.5)
LYMPHOCYTES ABSOLUTE: 979 CELLS/UL (ref 850–3900)
LYMPHOCYTES RELATIVE PERCENT: 14.4 %
MCH RBC QN AUTO: 30.6 PG (ref 27–33)
MCHC RBC AUTO-ENTMCNC: 33.2 G/DL (ref 32–36)
MCV RBC AUTO: 92 FL (ref 80–100)
METAMYELOCYTES ABSOLUTE COUNT: NORMAL CELLS/UL
METAMYELOCYTES RELATIVE PERCENT: NORMAL %
MONOCYTES ABSOLUTE: 388 CELLS/UL (ref 200–950)
MONOCYTES RELATIVE PERCENT: 5.7 %
MYELOCYTE PERCENT: NORMAL %
MYELOCYTES ABSOLUTE COUNT: NORMAL CELLS/UL
NEUTROPHILS ABSOLUTE: 5066 CELLS/UL (ref 1500–7800)
NUCLEATED RED BLOOD CELLS: NORMAL /100 WBC
NUCLEATED RED BLOOD CELLS: NORMAL CELLS/UL
PDW BLD-RTO: 13.5 % (ref 11–15)
PLATELET # BLD: 268 THOUSAND/UL (ref 140–400)
PMV BLD AUTO: 9.5 FL (ref 7.5–12.5)
POTASSIUM SERPL-SCNC: 4.5 MMOL/L (ref 3.5–5.3)
PROMYELOCYTES ABSOLUTE COUNT: NORMAL CELLS/UL
PROMYELOCYTES PERCENT: NORMAL %
RBC # BLD: 4.15 MILLION/UL (ref 3.8–5.1)
SEGMENTED NEUTROPHILS RELATIVE PERCENT: 74.5 %
SODIUM BLD-SCNC: 139 MMOL/L (ref 135–146)
TOTAL CK: 42 U/L (ref 29–143)
TOTAL PROTEIN: 6.9 G/DL (ref 6.1–8.1)
TSH ULTRASENSITIVE: 1.25 MIU/L (ref 0.4–4.5)
WBC # BLD: 6.8 THOUSAND/UL (ref 3.8–10.8)

## 2018-08-27 ENCOUNTER — TELEPHONE (OUTPATIENT)
Dept: FAMILY MEDICINE CLINIC | Age: 83
End: 2018-08-27

## 2018-08-27 NOTE — TELEPHONE ENCOUNTER
Patient is calling to get an appt for today patient is experiencing rash on breast right side with some itching and redness and its spreading x 1 day off and on.           Please advice

## 2018-08-28 ENCOUNTER — OFFICE VISIT (OUTPATIENT)
Dept: FAMILY MEDICINE CLINIC | Age: 83
End: 2018-08-28

## 2018-08-28 VITALS
WEIGHT: 130.2 LBS | DIASTOLIC BLOOD PRESSURE: 70 MMHG | SYSTOLIC BLOOD PRESSURE: 110 MMHG | HEIGHT: 60 IN | BODY MASS INDEX: 25.56 KG/M2 | OXYGEN SATURATION: 95 % | HEART RATE: 75 BPM

## 2018-08-28 DIAGNOSIS — B35.4 TINEA CORPORIS: ICD-10-CM

## 2018-08-28 PROCEDURE — G8419 CALC BMI OUT NRM PARAM NOF/U: HCPCS | Performed by: FAMILY MEDICINE

## 2018-08-28 PROCEDURE — 4040F PNEUMOC VAC/ADMIN/RCVD: CPT | Performed by: FAMILY MEDICINE

## 2018-08-28 PROCEDURE — G8399 PT W/DXA RESULTS DOCUMENT: HCPCS | Performed by: FAMILY MEDICINE

## 2018-08-28 PROCEDURE — 99213 OFFICE O/P EST LOW 20 MIN: CPT | Performed by: FAMILY MEDICINE

## 2018-08-28 PROCEDURE — G8598 ASA/ANTIPLAT THER USED: HCPCS | Performed by: FAMILY MEDICINE

## 2018-08-28 PROCEDURE — G8427 DOCREV CUR MEDS BY ELIG CLIN: HCPCS | Performed by: FAMILY MEDICINE

## 2018-08-28 PROCEDURE — 1090F PRES/ABSN URINE INCON ASSESS: CPT | Performed by: FAMILY MEDICINE

## 2018-08-28 PROCEDURE — 1123F ACP DISCUSS/DSCN MKR DOCD: CPT | Performed by: FAMILY MEDICINE

## 2018-08-28 PROCEDURE — 1036F TOBACCO NON-USER: CPT | Performed by: FAMILY MEDICINE

## 2018-08-28 PROCEDURE — 1101F PT FALLS ASSESS-DOCD LE1/YR: CPT | Performed by: FAMILY MEDICINE

## 2018-08-28 RX ORDER — KETOCONAZOLE 20 MG/G
CREAM TOPICAL
Qty: 30 G | Refills: 1 | Status: SHIPPED | OUTPATIENT
Start: 2018-08-28

## 2018-08-28 ASSESSMENT — ENCOUNTER SYMPTOMS
SHORTNESS OF BREATH: 0
COUGH: 0
VOMITING: 0
EYE PAIN: 0
SORE THROAT: 0
DIARRHEA: 0
NAIL CHANGES: 0
RHINORRHEA: 0

## 2018-08-28 NOTE — PROGRESS NOTES
6/12/2017    Thyroid nodule        Past Surgical History:   Procedure Laterality Date    CHOLECYSTECTOMY, LAPAROSCOPIC  8/14/2012    MULTIPORT ROBOTIC ASSISTED LAPAROSCOPIC CHOLECYSTECTOMY WITH    CORONARY ANGIOPLASTY WITH STENT PLACEMENT  07/2003    THYROIDECTOMY, PARTIAL  11/85       Social History     Social History    Marital status: Single     Spouse name: N/A    Number of children: 0    Years of education: N/A     Occupational History    office for [employer]      Social History Main Topics    Smoking status: Former Smoker     Quit date: 8/13/1985    Smokeless tobacco: Never Used    Alcohol use Yes      Comment: Socially    Drug use: No    Sexual activity: Not Currently     Other Topics Concern    Not on file     Social History Narrative    Lives with room mate    Retired    Travels and golf       Family History   Problem Relation Age of Onset    Coronary Art Dis Mother     Stroke Mother     Other Mother         gallstones    Coronary Art Dis Father     Heart Disease Father     Prostate Cancer Brother     High Cholesterol Brother     Other Brother         gallstones    Cancer Brother         prostate    Heart Disease Brother     Other Brother         gallstones    Cancer Brother         prostate       Immunization History   Administered Date(s) Administered    Influenza A (H1N1) Vaccine IM 12/22/2009    Influenza Vaccine, unspecified formulation 10/24/2016    Influenza, High Dose 10/02/2017    Pneumococcal 13-valent Conjugate ([id number]) 01/06/2015    Pneumococcal Polysaccharide ([id number]) 07/07/2004    Zoster Subunit (Shingrix) 06/14/2018       Review of Systems  Review of Systems   Constitutional: Negative for fatigue and fever. HENT: Negative for congestion, rhinorrhea and sore throat. Eyes: Negative for pain. Respiratory: Negative for cough and shortness of breath. Gastrointestinal: Negative for anorexia, diarrhea and vomiting.    Musculoskeletal: Negative for joint pain. Skin: Positive for rash. Negative for nail changes. Objective:   Physical Exam  Physical Exam   Skin: There is erythema.    Under both breasts        Assessment and Plan:     Tinea corporis  Trial of nizoral

## 2018-08-30 ENCOUNTER — OFFICE VISIT (OUTPATIENT)
Dept: PULMONOLOGY | Age: 83
End: 2018-08-30

## 2018-08-30 VITALS
SYSTOLIC BLOOD PRESSURE: 114 MMHG | WEIGHT: 130.6 LBS | OXYGEN SATURATION: 10 % | DIASTOLIC BLOOD PRESSURE: 66 MMHG | HEART RATE: 64 BPM | HEIGHT: 60 IN | BODY MASS INDEX: 25.64 KG/M2

## 2018-08-30 DIAGNOSIS — R63.0 ANOREXIA: ICD-10-CM

## 2018-08-30 DIAGNOSIS — J84.112 IPF (IDIOPATHIC PULMONARY FIBROSIS) (HCC): Primary | ICD-10-CM

## 2018-08-30 DIAGNOSIS — R63.4 WEIGHT LOSS: ICD-10-CM

## 2018-08-30 PROCEDURE — 99214 OFFICE O/P EST MOD 30 MIN: CPT | Performed by: INTERNAL MEDICINE

## 2018-08-30 PROCEDURE — 1090F PRES/ABSN URINE INCON ASSESS: CPT | Performed by: INTERNAL MEDICINE

## 2018-08-30 PROCEDURE — G8427 DOCREV CUR MEDS BY ELIG CLIN: HCPCS | Performed by: INTERNAL MEDICINE

## 2018-08-30 PROCEDURE — 1101F PT FALLS ASSESS-DOCD LE1/YR: CPT | Performed by: INTERNAL MEDICINE

## 2018-08-30 PROCEDURE — G8419 CALC BMI OUT NRM PARAM NOF/U: HCPCS | Performed by: INTERNAL MEDICINE

## 2018-08-30 PROCEDURE — 1036F TOBACCO NON-USER: CPT | Performed by: INTERNAL MEDICINE

## 2018-08-30 PROCEDURE — G8598 ASA/ANTIPLAT THER USED: HCPCS | Performed by: INTERNAL MEDICINE

## 2018-08-30 PROCEDURE — G8399 PT W/DXA RESULTS DOCUMENT: HCPCS | Performed by: INTERNAL MEDICINE

## 2018-08-30 PROCEDURE — 1123F ACP DISCUSS/DSCN MKR DOCD: CPT | Performed by: INTERNAL MEDICINE

## 2018-08-30 PROCEDURE — 4040F PNEUMOC VAC/ADMIN/RCVD: CPT | Performed by: INTERNAL MEDICINE

## 2018-08-30 NOTE — PROGRESS NOTES
check LFTs in the future only when clinically needed  3. She has a portable oxygen concentrator  4. She has a pulse oximeter and monitors oxygen saturation with exertion. She knows the goal oxygen saturation is 88% or higher  5. She is current up to date with her Prevnar 15, and Pneumovax  6.   Return to my office in 3 months or sooner if needed

## 2018-10-01 ENCOUNTER — TELEPHONE (OUTPATIENT)
Dept: PULMONOLOGY | Age: 83
End: 2018-10-01

## 2018-10-02 ENCOUNTER — OFFICE VISIT (OUTPATIENT)
Dept: CARDIOLOGY CLINIC | Age: 83
End: 2018-10-02
Payer: MEDICARE

## 2018-10-02 VITALS
WEIGHT: 126.6 LBS | BODY MASS INDEX: 24.85 KG/M2 | HEIGHT: 60 IN | OXYGEN SATURATION: 96 % | DIASTOLIC BLOOD PRESSURE: 70 MMHG | SYSTOLIC BLOOD PRESSURE: 110 MMHG | HEART RATE: 72 BPM

## 2018-10-02 DIAGNOSIS — I25.10 CAD IN NATIVE ARTERY: ICD-10-CM

## 2018-10-02 DIAGNOSIS — R06.02 SOB (SHORTNESS OF BREATH): ICD-10-CM

## 2018-10-02 DIAGNOSIS — J84.9 INTERSTITIAL LUNG DISEASE (HCC): ICD-10-CM

## 2018-10-02 DIAGNOSIS — E78.5 HYPERLIPIDEMIA, UNSPECIFIED HYPERLIPIDEMIA TYPE: ICD-10-CM

## 2018-10-02 DIAGNOSIS — I10 ESSENTIAL HYPERTENSION: Primary | ICD-10-CM

## 2018-10-02 PROCEDURE — G8420 CALC BMI NORM PARAMETERS: HCPCS | Performed by: INTERNAL MEDICINE

## 2018-10-02 PROCEDURE — 1090F PRES/ABSN URINE INCON ASSESS: CPT | Performed by: INTERNAL MEDICINE

## 2018-10-02 PROCEDURE — G8399 PT W/DXA RESULTS DOCUMENT: HCPCS | Performed by: INTERNAL MEDICINE

## 2018-10-02 PROCEDURE — 1101F PT FALLS ASSESS-DOCD LE1/YR: CPT | Performed by: INTERNAL MEDICINE

## 2018-10-02 PROCEDURE — G8598 ASA/ANTIPLAT THER USED: HCPCS | Performed by: INTERNAL MEDICINE

## 2018-10-02 PROCEDURE — G8484 FLU IMMUNIZE NO ADMIN: HCPCS | Performed by: INTERNAL MEDICINE

## 2018-10-02 PROCEDURE — 1036F TOBACCO NON-USER: CPT | Performed by: INTERNAL MEDICINE

## 2018-10-02 PROCEDURE — G8427 DOCREV CUR MEDS BY ELIG CLIN: HCPCS | Performed by: INTERNAL MEDICINE

## 2018-10-02 PROCEDURE — 1123F ACP DISCUSS/DSCN MKR DOCD: CPT | Performed by: INTERNAL MEDICINE

## 2018-10-02 PROCEDURE — 4040F PNEUMOC VAC/ADMIN/RCVD: CPT | Performed by: INTERNAL MEDICINE

## 2018-10-02 PROCEDURE — 99214 OFFICE O/P EST MOD 30 MIN: CPT | Performed by: INTERNAL MEDICINE

## 2018-10-02 ASSESSMENT — ENCOUNTER SYMPTOMS
CHOKING: 0
SHORTNESS OF BREATH: 1
CHEST TIGHTNESS: 0
COUGH: 0

## 2018-10-19 ENCOUNTER — TELEPHONE (OUTPATIENT)
Dept: PULMONOLOGY | Age: 83
End: 2018-10-19

## 2018-10-19 DIAGNOSIS — R63.0 ANOREXIA: Primary | ICD-10-CM

## 2018-10-19 NOTE — TELEPHONE ENCOUNTER
Called and talked to Hector Armas, she takes 1 tab of Loperamide daily, has taken more when Diarrhea is really bad though, she is not sure of dosage though.

## 2018-10-19 NOTE — TELEPHONE ENCOUNTER
Alessandra Costa called to say that Susie Bain is still losing weight and having diarrhea, she is still taking Ofev. Please Advise.

## 2018-10-22 RX ORDER — DRONABINOL 2.5 MG/1
2.5 CAPSULE ORAL
Qty: 60 CAPSULE | Refills: 3 | Status: SHIPPED | OUTPATIENT
Start: 2018-10-22 | End: 2018-11-21

## 2018-10-29 ENCOUNTER — TELEPHONE (OUTPATIENT)
Dept: PULMONOLOGY | Age: 83
End: 2018-10-29

## 2018-10-29 NOTE — TELEPHONE ENCOUNTER
Called and talked to Addison Lopes, gave her instr. To stop Ofev for 4 weeks, then call and let us know how she is doing.

## 2018-10-29 NOTE — TELEPHONE ENCOUNTER
Called to say that the Marinoll 2.5 mg has not helped she is still losing weight and feels even more neseaed than before taking. Wondering if she can stop Ofev for awhile to see if that will help. Please Advise.

## 2018-12-02 RX ORDER — LEVOTHYROXINE SODIUM 0.05 MG/1
TABLET ORAL
Qty: 90 TABLET | Refills: 2 | Status: SHIPPED | OUTPATIENT
Start: 2018-12-02 | End: 2019-08-28 | Stop reason: SDUPTHER

## 2018-12-02 RX ORDER — FOLIC ACID 1 MG/1
TABLET ORAL
Qty: 180 TABLET | Refills: 2 | Status: SHIPPED | OUTPATIENT
Start: 2018-12-02 | End: 2019-08-28 | Stop reason: SDUPTHER

## 2018-12-02 RX ORDER — ROSUVASTATIN CALCIUM 20 MG/1
TABLET, COATED ORAL
Qty: 60 TABLET | Refills: 2 | Status: SHIPPED | OUTPATIENT
Start: 2018-12-02 | End: 2019-04-26 | Stop reason: SDUPTHER

## 2018-12-12 ENCOUNTER — TELEPHONE (OUTPATIENT)
Dept: PHARMACY | Facility: CLINIC | Age: 83
End: 2018-12-12

## 2018-12-13 ENCOUNTER — OFFICE VISIT (OUTPATIENT)
Dept: FAMILY MEDICINE CLINIC | Age: 83
End: 2018-12-13
Payer: MEDICARE

## 2018-12-13 ENCOUNTER — TELEPHONE (OUTPATIENT)
Dept: PULMONOLOGY | Age: 83
End: 2018-12-13

## 2018-12-13 ENCOUNTER — OFFICE VISIT (OUTPATIENT)
Dept: PULMONOLOGY | Age: 83
End: 2018-12-13
Payer: MEDICARE

## 2018-12-13 VITALS
RESPIRATION RATE: 16 BRPM | HEIGHT: 61 IN | DIASTOLIC BLOOD PRESSURE: 70 MMHG | SYSTOLIC BLOOD PRESSURE: 118 MMHG | OXYGEN SATURATION: 96 % | WEIGHT: 122.6 LBS | HEART RATE: 57 BPM | BODY MASS INDEX: 23.15 KG/M2

## 2018-12-13 VITALS
HEIGHT: 60 IN | HEART RATE: 64 BPM | SYSTOLIC BLOOD PRESSURE: 102 MMHG | RESPIRATION RATE: 16 BRPM | DIASTOLIC BLOOD PRESSURE: 60 MMHG | WEIGHT: 122 LBS | BODY MASS INDEX: 23.95 KG/M2 | OXYGEN SATURATION: 96 %

## 2018-12-13 DIAGNOSIS — R63.4 WEIGHT LOSS: ICD-10-CM

## 2018-12-13 DIAGNOSIS — I10 ESSENTIAL HYPERTENSION: ICD-10-CM

## 2018-12-13 DIAGNOSIS — I25.10 CORONARY ARTERY DISEASE INVOLVING NATIVE HEART WITHOUT ANGINA PECTORIS, UNSPECIFIED VESSEL OR LESION TYPE: ICD-10-CM

## 2018-12-13 DIAGNOSIS — R19.7 DIARRHEA, UNSPECIFIED TYPE: ICD-10-CM

## 2018-12-13 DIAGNOSIS — E03.9 HYPOTHYROIDISM, UNSPECIFIED TYPE: Chronic | ICD-10-CM

## 2018-12-13 DIAGNOSIS — J84.112 IPF (IDIOPATHIC PULMONARY FIBROSIS) (HCC): ICD-10-CM

## 2018-12-13 DIAGNOSIS — N18.30 CHRONIC KIDNEY DISEASE, STAGE III (MODERATE) (HCC): ICD-10-CM

## 2018-12-13 DIAGNOSIS — J84.112 IPF (IDIOPATHIC PULMONARY FIBROSIS) (HCC): Primary | ICD-10-CM

## 2018-12-13 DIAGNOSIS — R63.0 ANOREXIA: ICD-10-CM

## 2018-12-13 DIAGNOSIS — N18.2 STAGE 2 CHRONIC KIDNEY DISEASE: ICD-10-CM

## 2018-12-13 PROCEDURE — 1090F PRES/ABSN URINE INCON ASSESS: CPT | Performed by: INTERNAL MEDICINE

## 2018-12-13 PROCEDURE — G8420 CALC BMI NORM PARAMETERS: HCPCS | Performed by: FAMILY MEDICINE

## 2018-12-13 PROCEDURE — 4040F PNEUMOC VAC/ADMIN/RCVD: CPT | Performed by: FAMILY MEDICINE

## 2018-12-13 PROCEDURE — G8427 DOCREV CUR MEDS BY ELIG CLIN: HCPCS | Performed by: INTERNAL MEDICINE

## 2018-12-13 PROCEDURE — G8598 ASA/ANTIPLAT THER USED: HCPCS | Performed by: INTERNAL MEDICINE

## 2018-12-13 PROCEDURE — G8420 CALC BMI NORM PARAMETERS: HCPCS | Performed by: INTERNAL MEDICINE

## 2018-12-13 PROCEDURE — G8598 ASA/ANTIPLAT THER USED: HCPCS | Performed by: FAMILY MEDICINE

## 2018-12-13 PROCEDURE — 1101F PT FALLS ASSESS-DOCD LE1/YR: CPT | Performed by: INTERNAL MEDICINE

## 2018-12-13 PROCEDURE — G8482 FLU IMMUNIZE ORDER/ADMIN: HCPCS | Performed by: INTERNAL MEDICINE

## 2018-12-13 PROCEDURE — 99214 OFFICE O/P EST MOD 30 MIN: CPT | Performed by: INTERNAL MEDICINE

## 2018-12-13 PROCEDURE — 1101F PT FALLS ASSESS-DOCD LE1/YR: CPT | Performed by: FAMILY MEDICINE

## 2018-12-13 PROCEDURE — 1036F TOBACCO NON-USER: CPT | Performed by: FAMILY MEDICINE

## 2018-12-13 PROCEDURE — G8482 FLU IMMUNIZE ORDER/ADMIN: HCPCS | Performed by: FAMILY MEDICINE

## 2018-12-13 PROCEDURE — 99214 OFFICE O/P EST MOD 30 MIN: CPT | Performed by: FAMILY MEDICINE

## 2018-12-13 PROCEDURE — 4040F PNEUMOC VAC/ADMIN/RCVD: CPT | Performed by: INTERNAL MEDICINE

## 2018-12-13 PROCEDURE — 1123F ACP DISCUSS/DSCN MKR DOCD: CPT | Performed by: INTERNAL MEDICINE

## 2018-12-13 PROCEDURE — 1090F PRES/ABSN URINE INCON ASSESS: CPT | Performed by: FAMILY MEDICINE

## 2018-12-13 PROCEDURE — 1123F ACP DISCUSS/DSCN MKR DOCD: CPT | Performed by: FAMILY MEDICINE

## 2018-12-13 PROCEDURE — G8427 DOCREV CUR MEDS BY ELIG CLIN: HCPCS | Performed by: FAMILY MEDICINE

## 2018-12-13 PROCEDURE — 1036F TOBACCO NON-USER: CPT | Performed by: INTERNAL MEDICINE

## 2018-12-13 PROCEDURE — G8399 PT W/DXA RESULTS DOCUMENT: HCPCS | Performed by: INTERNAL MEDICINE

## 2018-12-13 PROCEDURE — G8399 PT W/DXA RESULTS DOCUMENT: HCPCS | Performed by: FAMILY MEDICINE

## 2018-12-13 ASSESSMENT — ENCOUNTER SYMPTOMS
COUGH: 0
ABDOMINAL PAIN: 0
BLURRED VISION: 0
NAUSEA: 0
CHANGE IN BOWEL HABIT: 0
VOMITING: 0
SHORTNESS OF BREATH: 0
SORE THROAT: 0
SWOLLEN GLANDS: 0
VISUAL CHANGE: 0
ORTHOPNEA: 0

## 2018-12-13 NOTE — PROGRESS NOTES
pericardial effusion is   appreciated.  Large hiatal hernia is present.       HRCT Findings/Lungs/pleura: Routine images through the lung fields   demonstrate no focal dense consolidation.  High-resolution images through the   lungs demonstrate diffuse moderate bronchiectasis involving upper lobes and   lower lobes and right middle lobe.  There are scattered areas of nonspecific   ground-glass.  Peripheral subpleural reticular lines are noted these are   present bilaterally.  With a lower lobe predominance, there are areas of   honeycomb lung.  Significant pattern of air trapping not appreciated   expiration images.       Upper Abdomen: Limited evaluation without acute finding.       Soft Tissues/Bones: Moderate diffuse degenerative changes are noted about   spine.           Impression   Extensive changes are noted bilaterally suggestive of interstitial lung   disease. Jeniffer Spire is most suggestive of usual interstitial pneumonitis   pattern.  Clinical correlation and follow-up is recommended.       Heavy coronary artery calcifications are noted.       Large hiatal hernia is present. Last PFTs:  DATE OF STUDY:  04/28/2017     Forced vital capacity mildly reduced expiratory flow rates and FEV1 to FVC  ratio normal.  No change after bronchodilator. Total lung capacity and FRC  moderately reduced, RV severely reduced. Single breath diffusion capacity  moderately reduced.     IMPRESSION:  Mild restrictive ventilatory defect. Findings may be consistent  with a neuromuscular chest wall, pleural or parenchymal disorder. Assessment:      Diagnosis Orders   1. IPF (idiopathic pulmonary fibrosis) ([address]     2. Diarrhea, unspecified type     3. Anorexia     4. Weight loss         Plan:     1. Stop Ofev due to intolerance due to diarrhea, anorexia, and weight loss unresponsive to reduced dose of Ofev, Imodium, and Marinol.  Alternatives were discussed with Sisi Angela including changing to Pirfenidone or stopping therapy altogether for IPF. She will think about it  2. Mela Rubio will follow up in regards to her nocturnal pulse oximetry study that was performed but not resulted  3. She has a portable oxygen concentrator  4. She has a pulse oximeter and monitors oxygen saturation with exertion. She knows the goal oxygen saturation is 88% or higher  5. She is current up to date with her Prevnar 15, influenza vaccination and Pneumovax  6.   Return to my office in 3 months or sooner if needed

## 2018-12-13 NOTE — TELEPHONE ENCOUNTER
Second time in attempting to reach patient to review.  Left message asking for return call to direct (office) [phone] or toll free [phone] option 7.     Chiara Neri, 2019 PharmD Candidate

## 2018-12-13 NOTE — PROGRESS NOTES
Disease  Stable with current plan    Essential hypertension  labs    Hypothyroidism  Stable with current plan    IPF (idiopathic pulmonary fibrosis) ([facility] [address]  Off meds because of SE and wants to DC new one too    CKD (chronic kidney disease)  Stable with current plan

## 2018-12-28 RX ORDER — LOSARTAN POTASSIUM 100 MG/1
TABLET ORAL
Qty: 90 TABLET | Refills: 2 | Status: SHIPPED | OUTPATIENT
Start: 2018-12-28 | End: 2019-01-07 | Stop reason: ALTCHOICE

## 2019-01-03 RX ORDER — ISOSORBIDE MONONITRATE 30 MG/1
TABLET, EXTENDED RELEASE ORAL
Qty: 90 TABLET | Refills: 3 | Status: SHIPPED | OUTPATIENT
Start: 2019-01-03 | End: 2019-12-23

## 2019-01-07 ENCOUNTER — TELEPHONE (OUTPATIENT)
Dept: FAMILY MEDICINE CLINIC | Age: 84
End: 2019-01-07

## 2019-01-07 RX ORDER — OLMESARTAN MEDOXOMIL 20 MG/1
20 TABLET ORAL DAILY
Qty: 30 TABLET | Refills: 3 | Status: SHIPPED | OUTPATIENT
Start: 2019-01-07 | End: 2019-02-04 | Stop reason: ALTCHOICE

## 2019-01-17 DIAGNOSIS — J84.112 IPF (IDIOPATHIC PULMONARY FIBROSIS) (HCC): ICD-10-CM

## 2019-02-04 ENCOUNTER — TELEPHONE (OUTPATIENT)
Dept: FAMILY MEDICINE CLINIC | Age: 84
End: 2019-02-04

## 2019-02-04 RX ORDER — IRBESARTAN 75 MG/1
75 TABLET ORAL DAILY
Qty: 30 TABLET | Refills: 5 | Status: SHIPPED | OUTPATIENT
Start: 2019-02-04 | End: 2019-06-04 | Stop reason: SDUPTHER

## 2019-02-15 ENCOUNTER — TELEPHONE (OUTPATIENT)
Dept: PULMONOLOGY | Age: 84
End: 2019-02-15

## 2019-03-13 ENCOUNTER — TELEPHONE (OUTPATIENT)
Dept: FAMILY MEDICINE CLINIC | Age: 84
End: 2019-03-13

## 2019-03-13 DIAGNOSIS — I10 ESSENTIAL HYPERTENSION: Primary | ICD-10-CM

## 2019-03-26 ENCOUNTER — OFFICE VISIT (OUTPATIENT)
Dept: PULMONOLOGY | Age: 84
End: 2019-03-26
Payer: MEDICARE

## 2019-03-26 ENCOUNTER — OFFICE VISIT (OUTPATIENT)
Dept: FAMILY MEDICINE CLINIC | Age: 84
End: 2019-03-26
Payer: MEDICARE

## 2019-03-26 VITALS
HEART RATE: 76 BPM | DIASTOLIC BLOOD PRESSURE: 80 MMHG | OXYGEN SATURATION: 97 % | SYSTOLIC BLOOD PRESSURE: 100 MMHG | BODY MASS INDEX: 24.74 KG/M2 | WEIGHT: 126 LBS | HEIGHT: 60 IN

## 2019-03-26 VITALS
BODY MASS INDEX: 24.85 KG/M2 | OXYGEN SATURATION: 96 % | WEIGHT: 126.6 LBS | SYSTOLIC BLOOD PRESSURE: 110 MMHG | HEART RATE: 58 BPM | HEIGHT: 60 IN | DIASTOLIC BLOOD PRESSURE: 70 MMHG

## 2019-03-26 DIAGNOSIS — J84.112 IPF (IDIOPATHIC PULMONARY FIBROSIS) (HCC): ICD-10-CM

## 2019-03-26 DIAGNOSIS — N18.30 CHRONIC KIDNEY DISEASE, STAGE III (MODERATE) (HCC): ICD-10-CM

## 2019-03-26 DIAGNOSIS — E03.9 HYPOTHYROIDISM, UNSPECIFIED TYPE: Primary | ICD-10-CM

## 2019-03-26 DIAGNOSIS — I10 ESSENTIAL HYPERTENSION: ICD-10-CM

## 2019-03-26 DIAGNOSIS — E78.2 MIXED HYPERLIPIDEMIA: ICD-10-CM

## 2019-03-26 DIAGNOSIS — J84.112 IPF (IDIOPATHIC PULMONARY FIBROSIS) (HCC): Primary | ICD-10-CM

## 2019-03-26 LAB
A/G RATIO: 1.2 (ref 1.1–2.2)
ALBUMIN SERPL-MCNC: 3.9 G/DL (ref 3.4–5)
ALP BLD-CCNC: 92 U/L (ref 40–129)
ALT SERPL-CCNC: 7 U/L (ref 10–40)
ANION GAP SERPL CALCULATED.3IONS-SCNC: 12 MMOL/L (ref 3–16)
AST SERPL-CCNC: 15 U/L (ref 15–37)
BASOPHILS ABSOLUTE: 0.1 K/UL (ref 0–0.2)
BASOPHILS RELATIVE PERCENT: 1.3 %
BILIRUB SERPL-MCNC: <0.2 MG/DL (ref 0–1)
BUN BLDV-MCNC: 25 MG/DL (ref 7–20)
CALCIUM SERPL-MCNC: 9.6 MG/DL (ref 8.3–10.6)
CHLORIDE BLD-SCNC: 103 MMOL/L (ref 99–110)
CO2: 24 MMOL/L (ref 21–32)
CREAT SERPL-MCNC: 1.2 MG/DL (ref 0.6–1.2)
EOSINOPHILS ABSOLUTE: 0.3 K/UL (ref 0–0.6)
EOSINOPHILS RELATIVE PERCENT: 4.8 %
GFR AFRICAN AMERICAN: 52
GFR NON-AFRICAN AMERICAN: 43
GLOBULIN: 3.2 G/DL
GLUCOSE BLD-MCNC: 105 MG/DL (ref 70–99)
HCT VFR BLD CALC: 38.1 % (ref 36–48)
HEMOGLOBIN: 12.4 G/DL (ref 12–16)
LYMPHOCYTES ABSOLUTE: 1.2 K/UL (ref 1–5.1)
LYMPHOCYTES RELATIVE PERCENT: 16.8 %
MCH RBC QN AUTO: 30.3 PG (ref 26–34)
MCHC RBC AUTO-ENTMCNC: 32.5 G/DL (ref 31–36)
MCV RBC AUTO: 93.2 FL (ref 80–100)
MONOCYTES ABSOLUTE: 0.5 K/UL (ref 0–1.3)
MONOCYTES RELATIVE PERCENT: 6.5 %
NEUTROPHILS ABSOLUTE: 5.1 K/UL (ref 1.7–7.7)
NEUTROPHILS RELATIVE PERCENT: 70.6 %
PDW BLD-RTO: 14.4 % (ref 12.4–15.4)
PLATELET # BLD: 301 K/UL (ref 135–450)
PMV BLD AUTO: 7.9 FL (ref 5–10.5)
POTASSIUM SERPL-SCNC: 4.7 MMOL/L (ref 3.5–5.1)
RBC # BLD: 4.09 M/UL (ref 4–5.2)
SODIUM BLD-SCNC: 139 MMOL/L (ref 136–145)
TOTAL PROTEIN: 7.1 G/DL (ref 6.4–8.2)
WBC # BLD: 7.3 K/UL (ref 4–11)

## 2019-03-26 PROCEDURE — 1036F TOBACCO NON-USER: CPT | Performed by: FAMILY MEDICINE

## 2019-03-26 PROCEDURE — G8420 CALC BMI NORM PARAMETERS: HCPCS | Performed by: INTERNAL MEDICINE

## 2019-03-26 PROCEDURE — 1090F PRES/ABSN URINE INCON ASSESS: CPT | Performed by: FAMILY MEDICINE

## 2019-03-26 PROCEDURE — 1036F TOBACCO NON-USER: CPT | Performed by: INTERNAL MEDICINE

## 2019-03-26 PROCEDURE — 99214 OFFICE O/P EST MOD 30 MIN: CPT | Performed by: FAMILY MEDICINE

## 2019-03-26 PROCEDURE — G8420 CALC BMI NORM PARAMETERS: HCPCS | Performed by: FAMILY MEDICINE

## 2019-03-26 PROCEDURE — G8482 FLU IMMUNIZE ORDER/ADMIN: HCPCS | Performed by: INTERNAL MEDICINE

## 2019-03-26 PROCEDURE — G8598 ASA/ANTIPLAT THER USED: HCPCS | Performed by: INTERNAL MEDICINE

## 2019-03-26 PROCEDURE — G8598 ASA/ANTIPLAT THER USED: HCPCS | Performed by: FAMILY MEDICINE

## 2019-03-26 PROCEDURE — G8482 FLU IMMUNIZE ORDER/ADMIN: HCPCS | Performed by: FAMILY MEDICINE

## 2019-03-26 PROCEDURE — G8427 DOCREV CUR MEDS BY ELIG CLIN: HCPCS | Performed by: FAMILY MEDICINE

## 2019-03-26 PROCEDURE — 99214 OFFICE O/P EST MOD 30 MIN: CPT | Performed by: INTERNAL MEDICINE

## 2019-03-26 PROCEDURE — G8399 PT W/DXA RESULTS DOCUMENT: HCPCS | Performed by: INTERNAL MEDICINE

## 2019-03-26 PROCEDURE — 1101F PT FALLS ASSESS-DOCD LE1/YR: CPT | Performed by: FAMILY MEDICINE

## 2019-03-26 PROCEDURE — G8399 PT W/DXA RESULTS DOCUMENT: HCPCS | Performed by: FAMILY MEDICINE

## 2019-03-26 PROCEDURE — 1123F ACP DISCUSS/DSCN MKR DOCD: CPT | Performed by: INTERNAL MEDICINE

## 2019-03-26 PROCEDURE — 1123F ACP DISCUSS/DSCN MKR DOCD: CPT | Performed by: FAMILY MEDICINE

## 2019-03-26 PROCEDURE — G8427 DOCREV CUR MEDS BY ELIG CLIN: HCPCS | Performed by: INTERNAL MEDICINE

## 2019-03-26 PROCEDURE — 1101F PT FALLS ASSESS-DOCD LE1/YR: CPT | Performed by: INTERNAL MEDICINE

## 2019-03-26 PROCEDURE — 4040F PNEUMOC VAC/ADMIN/RCVD: CPT | Performed by: FAMILY MEDICINE

## 2019-03-26 PROCEDURE — 1090F PRES/ABSN URINE INCON ASSESS: CPT | Performed by: INTERNAL MEDICINE

## 2019-03-26 PROCEDURE — 4040F PNEUMOC VAC/ADMIN/RCVD: CPT | Performed by: INTERNAL MEDICINE

## 2019-03-26 ASSESSMENT — ENCOUNTER SYMPTOMS
CONSTIPATION: 0
CHOKING: 0
NAUSEA: 0
APNEA: 0
EYE PAIN: 0
DIARRHEA: 0
PHOTOPHOBIA: 0
SINUS PRESSURE: 0
ABDOMINAL PAIN: 0
EYE REDNESS: 0
EYE ITCHING: 0
WHEEZING: 0
COLOR CHANGE: 0
SORE THROAT: 0
RECTAL PAIN: 0
RHINORRHEA: 0
BACK PAIN: 0
VOMITING: 0
FACIAL SWELLING: 0
STRIDOR: 0
COUGH: 0
ABDOMINAL DISTENTION: 0
CHEST TIGHTNESS: 0
TROUBLE SWALLOWING: 0
ANAL BLEEDING: 0
EYE DISCHARGE: 0
SHORTNESS OF BREATH: 0
BLOOD IN STOOL: 0
VOICE CHANGE: 0

## 2019-03-26 ASSESSMENT — PATIENT HEALTH QUESTIONNAIRE - PHQ9
2. FEELING DOWN, DEPRESSED OR HOPELESS: 0
SUM OF ALL RESPONSES TO PHQ QUESTIONS 1-9: 0
SUM OF ALL RESPONSES TO PHQ QUESTIONS 1-9: 0
1. LITTLE INTEREST OR PLEASURE IN DOING THINGS: 0
SUM OF ALL RESPONSES TO PHQ9 QUESTIONS 1 & 2: 0

## 2019-03-27 DIAGNOSIS — E03.9 HYPOTHYROIDISM, UNSPECIFIED TYPE: ICD-10-CM

## 2019-03-27 DIAGNOSIS — E78.2 MIXED HYPERLIPIDEMIA: ICD-10-CM

## 2019-03-27 LAB
CHOLESTEROL, TOTAL: 194 MG/DL (ref 0–199)
HDLC SERPL-MCNC: 65 MG/DL (ref 40–60)
LDL CHOLESTEROL CALCULATED: 109 MG/DL
TRIGL SERPL-MCNC: 98 MG/DL (ref 0–150)
TSH SERPL DL<=0.05 MIU/L-ACNC: 3.86 UIU/ML (ref 0.27–4.2)
VLDLC SERPL CALC-MCNC: 20 MG/DL

## 2019-04-04 ENCOUNTER — HOSPITAL ENCOUNTER (OUTPATIENT)
Age: 84
Discharge: HOME | End: 2019-04-04
Payer: MEDICARE

## 2019-04-04 ENCOUNTER — HOSPITAL ENCOUNTER (OUTPATIENT)
Dept: GENERAL RADIOLOGY | Age: 84
Discharge: HOME | End: 2019-04-04
Payer: MEDICARE

## 2019-04-04 ENCOUNTER — OFFICE VISIT (OUTPATIENT)
Dept: CARDIOLOGY CLINIC | Age: 84
End: 2019-04-04
Payer: MEDICARE

## 2019-04-04 VITALS
HEART RATE: 72 BPM | DIASTOLIC BLOOD PRESSURE: 70 MMHG | WEIGHT: 129 LBS | BODY MASS INDEX: 25.19 KG/M2 | SYSTOLIC BLOOD PRESSURE: 128 MMHG

## 2019-04-04 DIAGNOSIS — E78.5 HYPERLIPIDEMIA, UNSPECIFIED HYPERLIPIDEMIA TYPE: ICD-10-CM

## 2019-04-04 DIAGNOSIS — J84.9 INTERSTITIAL LUNG DISEASE (HCC): ICD-10-CM

## 2019-04-04 DIAGNOSIS — I10 ESSENTIAL HYPERTENSION: Primary | ICD-10-CM

## 2019-04-04 DIAGNOSIS — J84.112 IPF (IDIOPATHIC PULMONARY FIBROSIS) (HCC): ICD-10-CM

## 2019-04-04 DIAGNOSIS — R06.02 SOB (SHORTNESS OF BREATH): ICD-10-CM

## 2019-04-04 DIAGNOSIS — I25.10 CAD IN NATIVE ARTERY: ICD-10-CM

## 2019-04-04 PROCEDURE — 1123F ACP DISCUSS/DSCN MKR DOCD: CPT | Performed by: INTERNAL MEDICINE

## 2019-04-04 PROCEDURE — G8427 DOCREV CUR MEDS BY ELIG CLIN: HCPCS | Performed by: INTERNAL MEDICINE

## 2019-04-04 PROCEDURE — G8419 CALC BMI OUT NRM PARAM NOF/U: HCPCS | Performed by: INTERNAL MEDICINE

## 2019-04-04 PROCEDURE — 1090F PRES/ABSN URINE INCON ASSESS: CPT | Performed by: INTERNAL MEDICINE

## 2019-04-04 PROCEDURE — 99214 OFFICE O/P EST MOD 30 MIN: CPT | Performed by: INTERNAL MEDICINE

## 2019-04-04 PROCEDURE — G8399 PT W/DXA RESULTS DOCUMENT: HCPCS | Performed by: INTERNAL MEDICINE

## 2019-04-04 PROCEDURE — G8598 ASA/ANTIPLAT THER USED: HCPCS | Performed by: INTERNAL MEDICINE

## 2019-04-04 PROCEDURE — 1036F TOBACCO NON-USER: CPT | Performed by: INTERNAL MEDICINE

## 2019-04-04 PROCEDURE — 71046 X-RAY EXAM CHEST 2 VIEWS: CPT

## 2019-04-04 PROCEDURE — 4040F PNEUMOC VAC/ADMIN/RCVD: CPT | Performed by: INTERNAL MEDICINE

## 2019-04-04 ASSESSMENT — ENCOUNTER SYMPTOMS
SHORTNESS OF BREATH: 1
CHOKING: 0
COUGH: 0
CHEST TIGHTNESS: 0

## 2019-04-04 NOTE — PROGRESS NOTES
Yes     Comment: Socially    Drug use: No    Sexual activity: Not Currently   Lifestyle    Physical activity:     Days per week: Not on file     Minutes per session: Not on file    Stress: Not on file   Relationships    Social connections:     Talks on phone: Not on file     Gets together: Not on file     Attends Judaism service: Not on file     Active member of club or organization: Not on file     Attends meetings of clubs or organizations: Not on file     Relationship status: Not on file    Intimate partner violence:     Fear of current or ex partner: Not on file     Emotionally abused: Not on file     Physically abused: Not on file     Forced sexual activity: Not on file   Other Topics Concern    Not on file   Social History Narrative    Lives with room mate    Retired    Travels and golf      [place] reviewed. Noncontributory      Vitals:    04/04/19 0934   BP: 128/70   Pulse: 72     Wt 129    Review of Systems   Constitutional: Negative for activity change, appetite change and fatigue. Respiratory: Positive for shortness of breath. Negative for cough, choking and chest tightness. Cardiovascular: Negative for chest pain, palpitations and leg swelling. Denies PND or orthopnea. No tachycardia or syncope. Neurological: Negative for dizziness, syncope and light-headedness. Psychiatric/Behavioral: Negative for agitation, behavioral problems and confusion. All other systems reviewed and are negative. Objective:   Physical Exam   Constitutional: She is oriented to person, place, and time. She appears well-developed and well-nourished. No distress. HENT:   Head: Normocephalic and atraumatic. Eyes: Conjunctivae and EOM are normal. Right eye exhibits no discharge. Left eye exhibits no discharge. Neck: Normal range of motion. No JVD present. Cardiovascular: Normal rate, regular rhythm, S1 normal, S2 normal and normal heart sounds. Exam reveals no gallop. No murmur heard.   Pulses: Radial pulses are 2+ on the right side, and 2+ on the left side. Pulmonary/Chest: Effort normal. No respiratory distress. She has decreased breath sounds. She has no wheezes. She has no rales. Abdominal: Soft. Bowel sounds are normal. She exhibits no distension. There is no tenderness. Musculoskeletal: Normal range of motion. She exhibits no edema. Neurological: She is alert and oriented to person, place, and time. Skin: Skin is warm and dry. Psychiatric: She has a normal mood and affect. Her behavior is normal. Thought content normal.         Assessment:       Diagnosis Orders   1. Essential hypertension     2. CAD in native artery     3. SOB (shortness of breath)     4. Hyperlipidemia, unspecified hyperlipidemia type     5. Interstitial lung disease ([address]             Plan:      CV  stable. Sob with some activities but unchanged. Pulmonary stable. No chest pain. Lipids per PCP. Reviewed previous records and testing. No changes. Continue to monitor. F/U 6 months.

## 2019-04-09 ENCOUNTER — HOSPITAL ENCOUNTER (OUTPATIENT)
Dept: PULMONOLOGY | Age: 84
Discharge: HOME | End: 2019-04-09
Payer: MEDICARE

## 2019-04-09 VITALS — OXYGEN SATURATION: 97 % | RESPIRATION RATE: 18 BRPM | HEART RATE: 67 BPM

## 2019-04-09 DIAGNOSIS — J84.112 IPF (IDIOPATHIC PULMONARY FIBROSIS) (HCC): ICD-10-CM

## 2019-04-09 LAB
DLCO %PRED: 33 %
DLCO PRED: NORMAL ML/MIN/MMHG
DLCO/VA %PRED: NORMAL %
DLCO/VA PRED: NORMAL ML/MIN/MMHG
DLCO/VA: NORMAL ML/MIN/MMHG
DLCO: NORMAL ML/MIN/MMHG
EXPIRATORY TIME: NORMAL SEC
FEF 25-75% %PRED-PRE: NORMAL L/SEC
FEF 25-75% PRED: NORMAL L/SEC
FEF 25-75%-PRE: NORMAL L/SEC
FEV1 %PRED-PRE: 78 %
FEV1 PRED: NORMAL L
FEV1/FVC %PRED-PRE: NORMAL %
FEV1/FVC PRED: NORMAL %
FEV1/FVC: 81 %
FEV1: NORMAL L
FVC %PRED-PRE: NORMAL %
FVC PRED: NORMAL L
FVC: NORMAL L
GAW %PRED: NORMAL %
GAW PRED: NORMAL L/S/CMH2O
GAW: NORMAL L/S/CMH2O
IC %PRED: NORMAL %
IC PRED: NORMAL L
IC: NORMAL L
MVV %PRED-PRE: NORMAL %
MVV PRED: NORMAL L/MIN
MVV-PRE: NORMAL L/MIN
PEF %PRED-PRE: NORMAL L/SEC
PEF PRED: NORMAL L/SEC
PEF-PRE: NORMAL L/SEC
RAW %PRED: NORMAL %
RAW PRED: NORMAL CMH2O/L/S
RAW: NORMAL CMH2O/L/S
RV %PRED: NORMAL %
RV PRED: NORMAL L
RV: NORMAL L
SVC %PRED: NORMAL %
SVC PRED: NORMAL L
SVC: NORMAL L
TLC %PRED: 58 %
TLC PRED: NORMAL L
TLC: NORMAL L
VA %PRED: NORMAL %
VA PRED: NORMAL L
VA: NORMAL L
VTG %PRED: NORMAL %
VTG PRED: NORMAL L
VTG: NORMAL L

## 2019-04-09 PROCEDURE — 94726 PLETHYSMOGRAPHY LUNG VOLUMES: CPT

## 2019-04-09 PROCEDURE — 94060 EVALUATION OF WHEEZING: CPT

## 2019-04-09 PROCEDURE — 94200 LUNG FUNCTION TEST (MBC/MVV): CPT

## 2019-04-09 PROCEDURE — 94760 N-INVAS EAR/PLS OXIMETRY 1: CPT

## 2019-04-09 PROCEDURE — 6370000000 HC RX 637 (ALT 250 FOR IP): Performed by: INTERNAL MEDICINE

## 2019-04-09 PROCEDURE — 94729 DIFFUSING CAPACITY: CPT

## 2019-04-09 RX ORDER — ALBUTEROL SULFATE 90 UG/1
4 AEROSOL, METERED RESPIRATORY (INHALATION) ONCE
Status: COMPLETED | OUTPATIENT
Start: 2019-04-09 | End: 2019-04-09

## 2019-04-09 RX ADMIN — Medication 4 PUFF: at 11:24

## 2019-04-09 ASSESSMENT — PULMONARY FUNCTION TESTS
FEV1/FVC: 81
FEV1_PERCENT_PREDICTED_PRE: 78

## 2019-04-26 RX ORDER — ROSUVASTATIN CALCIUM 20 MG/1
TABLET, COATED ORAL
Qty: 90 TABLET | Refills: 2 | Status: SHIPPED | OUTPATIENT
Start: 2019-04-26 | End: 2019-09-09

## 2019-06-04 RX ORDER — IRBESARTAN 75 MG/1
75 TABLET ORAL DAILY
Qty: 30 TABLET | Refills: 5 | Status: SHIPPED | OUTPATIENT
Start: 2019-06-04 | End: 2019-06-13 | Stop reason: SDUPTHER

## 2019-06-13 RX ORDER — IRBESARTAN 75 MG/1
75 TABLET ORAL DAILY
Qty: 90 TABLET | Refills: 3 | Status: SHIPPED | OUTPATIENT
Start: 2019-06-13 | End: 2020-07-04

## 2019-06-18 ENCOUNTER — OFFICE VISIT (OUTPATIENT)
Dept: PULMONOLOGY | Age: 84
End: 2019-06-18
Payer: MEDICARE

## 2019-06-18 VITALS
DIASTOLIC BLOOD PRESSURE: 62 MMHG | HEIGHT: 60 IN | HEART RATE: 69 BPM | OXYGEN SATURATION: 96 % | WEIGHT: 128 LBS | RESPIRATION RATE: 18 BRPM | SYSTOLIC BLOOD PRESSURE: 130 MMHG | BODY MASS INDEX: 25.13 KG/M2

## 2019-06-18 DIAGNOSIS — J96.11 CHRONIC HYPOXEMIC RESPIRATORY FAILURE (HCC): ICD-10-CM

## 2019-06-18 DIAGNOSIS — J84.112 IPF (IDIOPATHIC PULMONARY FIBROSIS) (HCC): Primary | ICD-10-CM

## 2019-06-18 PROCEDURE — 4040F PNEUMOC VAC/ADMIN/RCVD: CPT | Performed by: INTERNAL MEDICINE

## 2019-06-18 PROCEDURE — G8419 CALC BMI OUT NRM PARAM NOF/U: HCPCS | Performed by: INTERNAL MEDICINE

## 2019-06-18 PROCEDURE — G8427 DOCREV CUR MEDS BY ELIG CLIN: HCPCS | Performed by: INTERNAL MEDICINE

## 2019-06-18 PROCEDURE — 1090F PRES/ABSN URINE INCON ASSESS: CPT | Performed by: INTERNAL MEDICINE

## 2019-06-18 PROCEDURE — G8399 PT W/DXA RESULTS DOCUMENT: HCPCS | Performed by: INTERNAL MEDICINE

## 2019-06-18 PROCEDURE — 99214 OFFICE O/P EST MOD 30 MIN: CPT | Performed by: INTERNAL MEDICINE

## 2019-06-18 PROCEDURE — G8598 ASA/ANTIPLAT THER USED: HCPCS | Performed by: INTERNAL MEDICINE

## 2019-06-18 PROCEDURE — 1036F TOBACCO NON-USER: CPT | Performed by: INTERNAL MEDICINE

## 2019-06-18 PROCEDURE — 1123F ACP DISCUSS/DSCN MKR DOCD: CPT | Performed by: INTERNAL MEDICINE

## 2019-06-18 NOTE — PROGRESS NOTES
[facility] Pulmonary and Critical Care    Outpatient Follow Up Note    Subjective:   CHIEF COMPLAINT / HPI:     The patient is 80 y.o. female who presents today for follow up of IPF. She Stopped Ofev in 2018  due to unrelenting diarrhea, anorexia, and weight loss. She was unable to tolerate marinol due to nausea and not feeling well. Since off Ofev and marinol she feels much better, has regained her appetite, gained a few pounds and no longer has diarrhea. Her chronic CAGLE is mildly worse over the last 6 months. She does have a cough as well but is described as not too disruptive to her daily life    Past Medical History:    Past Medical History:   Diagnosis Date    Bilateral cataracts 2015    Cholelithiasis     Coronary artery disease     Hyperlipidemia     Hypertension     Hypothyroid 2014    IPF (idiopathic pulmonary fibrosis) ([facility] [address] 2017    Thyroid nodule        Social History:    Social History     Tobacco Use   Smoking Status Former Smoker    Last attempt to quit: 1985    Years since quittin.8   Smokeless Tobacco Never Used       Current Medications:  Current Outpatient Medications on File Prior to Visit   Medication Sig Dispense Refill    irbesartan (AVAPRO) 75 MG tablet Take 1 tablet by mouth daily 90 tablet 3    rosuvastatin (CRESTOR) 20 MG tablet TAKE ONE TABLET BY MOUTH ONCE NIGHTLY 90 tablet 2    metoprolol tartrate (LOPRESSOR) 25 MG tablet TAKE ONE AND ONE-HALF TABLET BY MOUTH TWICE A  tablet 1    isosorbide mononitrate (IMDUR) 30 MG extended release tablet TAKE ONE TABLET BY MOUTH DAILY 90 tablet 3    folic acid (FOLVITE) 1 MG tablet TAKE ONE TABLET BY MOUTH TWICE A  tablet 2    levothyroxine (SYNTHROID) 50 MCG tablet TAKE ONE TABLET BY MOUTH DAILY 90 tablet 2    Handicap Placard MISC by Does not apply route Duration: 5 years 1 each 0    ketoconazole (NIZORAL) 2 % cream Apply topically daily.  30 g 1    nitroGLYCERIN (NITROSTAT) 0.4 MG SL tablet Place 1 tablet under the tongue every 5 minutes as needed for Chest pain 25 tablet 3    aspirin 81 MG chewable tablet Take 81 mg by mouth daily.  omeprazole (PRILOSEC) 20 MG capsule Take 20 mg by mouth daily. No current facility-administered medications on file prior to visit. REVIEW OF SYSTEMS:    CONSTITUTIONAL: Negative for fevers and chills  HEENT: Negative for oropharyngeal exudate, post nasal drip, sinus pain / pressure, nasal congestion, ear pain  RESPIRATORY:  See HPI  CARDIOVASCULAR: Negative for chest pain, palpitations, edema  GASTROINTESTINAL: Negative for nausea, vomiting, diarrhea, constipation and abdominal pain  HEMATOLOGICAL: Negative for adenopathy  SKIN: Negative for clubbing, cyanosis, skin lesions  EXTREMITIES: Negative for weakness, decreased ROM  NEUROLOGICAL: Negative for unilateral weakness, speech or gait abnormalities    Objective:   PHYSICAL EXAM:        VITALS:  /62 (Site: Right Upper Arm, Position: Sitting, Cuff Size: Medium Adult)   Pulse 69   Resp 18   Ht 5' (1.524 m)   Wt 128 lb (58.1 kg)   SpO2 96%   Breastfeeding? No   BMI 25.00 kg/m²   On room air  CONSTITUTIONAL:  Awake, alert, cooperative, no apparent distress, and appears stated age  HEENT: No oropharyngeal exudate, PERRL, no cervical adenopathy, no tracheal deviation, thyroid size normal  LUNGS:  No increased work of breathing. Bilateral basilar crackles  CARDIOVASCULAR:  normal S1 and S2 and no JVD  ABDOMEN:  Normal bowel sounds, non-distended and non-tender to palpation  EXT: No edema, no calf tenderness. Pulses are present bilaterally. NEUROLOGIC:  Mental Status Exam:  Level of Alertness:   awake  Orientation:   person, place, time. SKIN:  normal skin color, texture, turgor, no redness, warmth, or swelling     DATA:      Radiology Review:  Pertinent images / reports were reviewed as a part of this visit.   CT chest dated May 10, 2017 reveals the following:  FINDINGS: Mediastinum: Limited evaluation without IV contrast.  Heavy coronary artery   calcifications are noted.  No significant pericardial effusion is   appreciated.  Large hiatal hernia is present.       HRCT Findings/Lungs/pleura: Routine images through the lung fields   demonstrate no focal dense consolidation.  High-resolution images through the   lungs demonstrate diffuse moderate bronchiectasis involving upper lobes and   lower lobes and right middle lobe.  There are scattered areas of nonspecific   ground-glass.  Peripheral subpleural reticular lines are noted these are   present bilaterally.  With a lower lobe predominance, there are areas of   honeycomb lung.  Significant pattern of air trapping not appreciated   expiration images.       Upper Abdomen: Limited evaluation without acute finding.       Soft Tissues/Bones: Moderate diffuse degenerative changes are noted about   spine.           Impression   Extensive changes are noted bilaterally suggestive of interstitial lung   disease. Forestine Amour is most suggestive of usual interstitial pneumonitis   pattern.  Clinical correlation and follow-up is recommended.       Heavy coronary artery calcifications are noted.       Large hiatal hernia is present. CXR 4/4/2019  COMPARISON: March 16, 2017       Findings: PA and lateral views of the chest were obtained. The trachea is midline and the cardiac silhouette is stable in size, remaining mildly enlarged. There is atherosclerotic calcification of the aortic arch. Prominence of the interstitial markings    throughout both lungs is again noted, presumably related to interstitial lung disease per history. There is no confluent pulmonary infiltrate. No effusion or pneumothorax. The osseous structures of the chest are intact.           Impression   Impression: Bilateral interstitial prominence diffusely, likely related to pulmonary fibrosis per history.      Last PFTs:  DATE OF PROCEDURE:  04/09/2019     INTERPRETATION: Spirometry reveals decreased FVC at 1.33 liters, which  is 70% predicted. FEV1 is decreased at 1.08 liters, which is 78%  predicted. FEV1/FVC ratio is normal.  There is a no significant change  after inhaled bronchodilators. Lung volumes reveal decreased total lung  capacity at 58% predicted, vital capacity is decreased at 66% predicted  and residual volume is decreased at 51% predicted. Diffusion capacity  is markedly decreased at 33% predicted.     IMPRESSION:  Moderate to severe restrictive lung disease.     Assessment:      Diagnosis Orders   1. [facility]idiopathic [facility]) ([facility]     2. Chronic hypoxemic respiratory failure ([facility]         Plan:     1.  Jessi Ly remains off Ofev and is feeling better as anorexia, weight loss and diarrhea have resolved. Robitussin DM prn cough  2. We reviewed CXR and PFTs, both mildly worse than 2017. We discussed trying low dose Pirfenidone. She will think about it. 3.  Continue portable oxygen concentrator  4. She has a pulse oximeter and monitors oxygen saturation with exertion. She knows the goal oxygen saturation is 88% or higher  5. She is current up to date with her Prevnar 15, influenza vaccination and Pneumovax  6.   Return to my office in 3 months or sooner if needed

## 2019-08-28 ENCOUNTER — TELEPHONE (OUTPATIENT)
Dept: PULMONOLOGY | Age: 84
End: 2019-08-28

## 2019-08-28 RX ORDER — LEVOTHYROXINE SODIUM 0.05 MG/1
TABLET ORAL
Qty: 90 TABLET | Refills: 1 | Status: SHIPPED | OUTPATIENT
Start: 2019-08-28 | End: 2020-02-21

## 2019-08-28 RX ORDER — FOLIC ACID 1 MG/1
TABLET ORAL
Qty: 180 TABLET | Refills: 1 | Status: SHIPPED | OUTPATIENT
Start: 2019-08-28 | End: 2020-02-21

## 2019-08-28 NOTE — TELEPHONE ENCOUNTER
Patient has 1 month worth and would like to summit for a refill today. Order for ofev is pending. She would like to start process now because of lengthy PA process.

## 2019-09-05 ENCOUNTER — TELEPHONE (OUTPATIENT)
Dept: PHARMACY | Facility: CLINIC | Age: 84
End: 2019-09-05

## 2019-09-09 ENCOUNTER — TELEPHONE (OUTPATIENT)
Dept: FAMILY MEDICINE CLINIC | Age: 84
End: 2019-09-09

## 2019-09-09 RX ORDER — ROSUVASTATIN CALCIUM 10 MG/1
TABLET, COATED ORAL
Qty: 90 TABLET | Refills: 3 | Status: ON HOLD | OUTPATIENT
Start: 2019-09-09 | End: 2019-11-20 | Stop reason: HOSPADM

## 2019-09-17 ENCOUNTER — OFFICE VISIT (OUTPATIENT)
Dept: PULMONOLOGY | Age: 84
End: 2019-09-17
Payer: MEDICARE

## 2019-09-17 VITALS
SYSTOLIC BLOOD PRESSURE: 124 MMHG | OXYGEN SATURATION: 89 % | RESPIRATION RATE: 16 BRPM | DIASTOLIC BLOOD PRESSURE: 62 MMHG | BODY MASS INDEX: 24.66 KG/M2 | HEART RATE: 68 BPM | HEIGHT: 60 IN | WEIGHT: 125.6 LBS

## 2019-09-17 DIAGNOSIS — J96.11 CHRONIC HYPOXEMIC RESPIRATORY FAILURE (HCC): ICD-10-CM

## 2019-09-17 DIAGNOSIS — J84.112 IPF (IDIOPATHIC PULMONARY FIBROSIS) (HCC): Primary | ICD-10-CM

## 2019-09-17 PROCEDURE — 4040F PNEUMOC VAC/ADMIN/RCVD: CPT | Performed by: INTERNAL MEDICINE

## 2019-09-17 PROCEDURE — G8598 ASA/ANTIPLAT THER USED: HCPCS | Performed by: INTERNAL MEDICINE

## 2019-09-17 PROCEDURE — 1123F ACP DISCUSS/DSCN MKR DOCD: CPT | Performed by: INTERNAL MEDICINE

## 2019-09-17 PROCEDURE — G8427 DOCREV CUR MEDS BY ELIG CLIN: HCPCS | Performed by: INTERNAL MEDICINE

## 2019-09-17 PROCEDURE — 1090F PRES/ABSN URINE INCON ASSESS: CPT | Performed by: INTERNAL MEDICINE

## 2019-09-17 PROCEDURE — 99214 OFFICE O/P EST MOD 30 MIN: CPT | Performed by: INTERNAL MEDICINE

## 2019-09-17 PROCEDURE — 1036F TOBACCO NON-USER: CPT | Performed by: INTERNAL MEDICINE

## 2019-09-17 PROCEDURE — G8420 CALC BMI NORM PARAMETERS: HCPCS | Performed by: INTERNAL MEDICINE

## 2019-09-18 ENCOUNTER — TELEPHONE (OUTPATIENT)
Dept: PULMONOLOGY | Age: 84
End: 2019-09-18

## 2019-09-26 ENCOUNTER — APPOINTMENT (OUTPATIENT)
Dept: GENERAL RADIOLOGY | Age: 84
End: 2019-09-26
Payer: MEDICARE

## 2019-09-26 ENCOUNTER — TELEPHONE (OUTPATIENT)
Dept: FAMILY MEDICINE CLINIC | Age: 84
End: 2019-09-26

## 2019-09-26 ENCOUNTER — APPOINTMENT (OUTPATIENT)
Dept: CT IMAGING | Age: 84
End: 2019-09-26
Payer: MEDICARE

## 2019-09-26 ENCOUNTER — HOSPITAL ENCOUNTER (EMERGENCY)
Age: 84
Discharge: HOME | End: 2019-09-26
Payer: MEDICARE

## 2019-09-26 VITALS
TEMPERATURE: 97.6 F | BODY MASS INDEX: 24.54 KG/M2 | SYSTOLIC BLOOD PRESSURE: 152 MMHG | WEIGHT: 125 LBS | DIASTOLIC BLOOD PRESSURE: 65 MMHG | HEIGHT: 60 IN | OXYGEN SATURATION: 95 % | HEART RATE: 71 BPM | RESPIRATION RATE: 16 BRPM

## 2019-09-26 DIAGNOSIS — S00.81XA ABRASION OF FACE, INITIAL ENCOUNTER: ICD-10-CM

## 2019-09-26 DIAGNOSIS — S42.221A 2-PART DISPLACED FRACTURE OF SURGICAL NECK OF RIGHT HUMERUS, INITIAL ENCOUNTER FOR CLOSED FRACTURE: Primary | ICD-10-CM

## 2019-09-26 DIAGNOSIS — S09.90XA CLOSED HEAD INJURY, INITIAL ENCOUNTER: ICD-10-CM

## 2019-09-26 PROCEDURE — 96374 THER/PROPH/DIAG INJ IV PUSH: CPT

## 2019-09-26 PROCEDURE — 99284 EMERGENCY DEPT VISIT MOD MDM: CPT

## 2019-09-26 PROCEDURE — 6360000002 HC RX W HCPCS: Performed by: NURSE PRACTITIONER

## 2019-09-26 PROCEDURE — 99284 EMERGENCY DEPT VISIT MOD MDM: CPT | Performed by: ORTHOPAEDIC SURGERY

## 2019-09-26 PROCEDURE — 6370000000 HC RX 637 (ALT 250 FOR IP): Performed by: NURSE PRACTITIONER

## 2019-09-26 PROCEDURE — 73110 X-RAY EXAM OF WRIST: CPT

## 2019-09-26 PROCEDURE — 73060 X-RAY EXAM OF HUMERUS: CPT

## 2019-09-26 PROCEDURE — 96375 TX/PRO/DX INJ NEW DRUG ADDON: CPT

## 2019-09-26 PROCEDURE — 70450 CT HEAD/BRAIN W/O DYE: CPT

## 2019-09-26 PROCEDURE — 72125 CT NECK SPINE W/O DYE: CPT

## 2019-09-26 PROCEDURE — 73130 X-RAY EXAM OF HAND: CPT

## 2019-09-26 PROCEDURE — 72170 X-RAY EXAM OF PELVIS: CPT

## 2019-09-26 PROCEDURE — 73030 X-RAY EXAM OF SHOULDER: CPT

## 2019-09-26 PROCEDURE — 70486 CT MAXILLOFACIAL W/O DYE: CPT

## 2019-09-26 RX ORDER — KETOROLAC TROMETHAMINE 30 MG/ML
15 INJECTION, SOLUTION INTRAMUSCULAR; INTRAVENOUS ONCE
Status: COMPLETED | OUTPATIENT
Start: 2019-09-26 | End: 2019-09-26

## 2019-09-26 RX ORDER — TRAMADOL HYDROCHLORIDE 50 MG/1
50 TABLET ORAL EVERY 6 HOURS PRN
Qty: 20 TABLET | Refills: 0 | Status: SHIPPED | OUTPATIENT
Start: 2019-09-26 | End: 2019-09-29

## 2019-09-26 RX ORDER — TRAMADOL HYDROCHLORIDE 50 MG/1
50 TABLET ORAL ONCE
Status: COMPLETED | OUTPATIENT
Start: 2019-09-26 | End: 2019-09-26

## 2019-09-26 RX ORDER — FENTANYL CITRATE 50 UG/ML
25 INJECTION, SOLUTION INTRAMUSCULAR; INTRAVENOUS ONCE
Status: COMPLETED | OUTPATIENT
Start: 2019-09-26 | End: 2019-09-26

## 2019-09-26 RX ORDER — MORPHINE SULFATE 2 MG/ML
2 INJECTION, SOLUTION INTRAMUSCULAR; INTRAVENOUS ONCE
Status: DISCONTINUED | OUTPATIENT
Start: 2019-09-26 | End: 2019-09-26

## 2019-09-26 RX ADMIN — KETOROLAC TROMETHAMINE 15 MG: 30 INJECTION, SOLUTION INTRAMUSCULAR at 13:55

## 2019-09-26 RX ADMIN — FENTANYL CITRATE 25 MCG: 50 INJECTION INTRAMUSCULAR; INTRAVENOUS at 12:06

## 2019-09-26 RX ADMIN — TRAMADOL HYDROCHLORIDE 50 MG: 50 TABLET, FILM COATED ORAL at 13:55

## 2019-09-26 ASSESSMENT — PAIN DESCRIPTION - PAIN TYPE: TYPE: ACUTE PAIN

## 2019-09-26 ASSESSMENT — PAIN DESCRIPTION - LOCATION: LOCATION: SHOULDER;ARM

## 2019-09-26 ASSESSMENT — PAIN SCALES - GENERAL
PAINLEVEL_OUTOF10: 8
PAINLEVEL_OUTOF10: 8
PAINLEVEL_OUTOF10: 6

## 2019-09-26 ASSESSMENT — ENCOUNTER SYMPTOMS
DIARRHEA: 0
CHEST TIGHTNESS: 0
NAUSEA: 0
SHORTNESS OF BREATH: 0
ABDOMINAL PAIN: 0
VOMITING: 0

## 2019-09-26 ASSESSMENT — PAIN DESCRIPTION - ORIENTATION: ORIENTATION: RIGHT

## 2019-09-26 ASSESSMENT — PAIN DESCRIPTION - PROGRESSION
CLINICAL_PROGRESSION: GRADUALLY IMPROVING
CLINICAL_PROGRESSION: NOT CHANGED

## 2019-09-26 NOTE — ED PROVIDER NOTES
Mother     Stroke Mother     Other Mother         gallstones    Coronary Art Dis Father     Heart Disease Father     Prostate Cancer Brother     High Cholesterol Brother     Other Brother         gallstones    Cancer Brother         prostate    Heart Disease Brother     Other Brother         gallstones    Cancer Brother         prostate          SOCIAL HISTORY       Social History     Socioeconomic History    Marital status: Single     Spouse name: None    Number of children: 0    Years of education: None    Highest education level: None   Occupational History    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[employer]   Social Needs    Financial resource strain: None    Food insecurity:     Worry: None     Inability: None    Transportation needs:     Medical: None     Non-medical: None   Tobacco Use    Smoking status: Former Smoker     Last attempt to quit: 1985     Years since quittin.1    Smokeless tobacco: Never Used   Substance and Sexual Activity    Alcohol use: Yes     Comment: Socially    Drug use: No    Sexual activity: Not Currently   Lifestyle    Physical activity:     Days per week: None     Minutes per session: None    Stress: None   Relationships    Social connections:     Talks on phone: None     Gets together: None     Attends Worship service: None     Active member of club or organization: None     Attends meetings of clubs or organizations: None     Relationship status: None    Intimate partner violence:     Fear of current or ex partner: None     Emotionally abused: None     Physically abused: None     Forced sexual activity: None   Other Topics Concern    None   Social History Narrative    Lives with room mate    Retired    Travels and golf       [facility]    (up to 7 for level 4, 8 or more for level 5)     ED Triage Vitals [19 1052]   BP Temp Temp Source Pulse Resp SpO2 Height Weight   135/67 97.6 °F (36.4 °C) Infrared 71 16 95 % 5' (1.524 m) 125 lb (56.7 kg)       Physical Exam   Constitutional: She is oriented to person, place, and time. She appears well-developed and well-nourished. HENT:   Head: Normocephalic and atraumatic. Right Ear: External ear normal.   Left Ear: External ear normal.   Eyes: Pupils are equal, round, and reactive to light. Conjunctivae and EOM are normal. Right eye exhibits no discharge. Left eye exhibits no discharge. Neck: Normal range of motion. Neck supple. No JVD present. Cardiovascular: Normal rate and regular rhythm. Exam reveals no friction rub. No murmur heard. Pulmonary/Chest: Effort normal and breath sounds normal. No stridor. No respiratory distress. She has no wheezes. Abdominal: Soft. She exhibits no mass. There is no tenderness. Musculoskeletal: Normal range of motion. Arms:  Neurological: She is alert and oriented to person, place, and time. She has normal strength. No cranial nerve deficit or sensory deficit. Coordination normal. GCS eye subscore is 4. GCS verbal subscore is 5. GCS motor subscore is 6. Skin: Skin is warm and dry. She is not diaphoretic. No pallor. Psychiatric: She has a normal mood and affect. Her behavior is normal.   Nursing note and vitals reviewed. DIAGNOSTIC RESULTS   LABS:    Labs Reviewed - No data to display    All other labs were within normal range or not returned as of this dictation. EKG: All EKG's are interpreted by the Emergency Department Physician in the absence of a cardiologist.  Please see their note for interpretation of EKG. RADIOLOGY:   Non-plain film images such as CT, Ultrasound and MRI are read by the radiologist. Plain radiographic images are visualized andpreliminarily interpreted by the  ED Provider with the below findings:        Interpretation [facility] Radiologist below, if available at the time of this note:    CT CERVICAL SPINE WO CONTRAST   Final Result   Head: No acute intracranial abnormality.       Cervical spine: BONES WO CONTRAST (Final result)   Result time 09/26/19 13:15:57   Final result by Ariana George MD (09/26/19 13:15:57)                Impression:    No acute traumatic injury of the facial bones. CT HEAD WO CONTRAST (Final result)   Result time 09/26/19 13:30:24   Final result by Thomas Moore MD (09/26/19 13:30:24)                Impression:    Head: No acute intracranial abnormality. Cervical spine: No acute abnormality of the cervical spine. XR PELVIS (1-2 VIEWS) (Final result)   Result time 09/26/19 12:45:17   Final result by Siddharth Ruano MD (09/26/19 12:45:17)                Impression:    No acute fracture or dislocation in the pelvis. Degenerative changes.                  XR HAND RIGHT (MIN 3 VIEWS) (Final result)   Result time 09/26/19 11:41:48   Final result by Sandi Gutierrez MD (09/26/19 11:41:48)                Impression:    No acute fracture.  Possible dorsal subluxation of the ulna which may be old              XR WRIST RIGHT (MIN 3 VIEWS) (Final result)   Result time 09/26/19 11:41:09   Final result by Taran Nagel MD (09/26/19 11:41:09)                Impression:    No acute osseous abnormality    Mild widening at the distal radioulnar joint.  This is likely chronic given  the degenerative change.  Correlate with point tenderness. XR SHOULDER RIGHT (MIN 2 VIEWS) (Final result)   Result time 09/26/19 11:39:04   Final result by Sandi Gutierrez MD (09/26/19 11:39:04)                Impression:    Comminuted humeral surgical neck fracture              Sling was placed on right arm. Dr. Chikis Zacarias, did see the patient and meet family at the bedside. She elected that she would like to proceed with surgery outpatient on Tuesday. Fentanyl was given in the ER and toradol after CT head was resulted unremarkable. She was given ultram by mouth before discharge.     Family patient encouraged to contact primary care today with upcoming surgery for any medical clearance that is necessary. I estimate there is LOW risk for COMPARTMENT SYNDROME, DEEP VENOUS THROMBOSIS, SEPTIC ARTHRITIS, TENDON OR NEUROVASCULAR INJURY,     I estimate there is LOW risk for SKULL FRACTURE, INTRACRANIAL HEMORRHAGE, CERVICAL SPINE INJURY, SUBDURAL OR EPIDURAL HEMATOMA,  thus I consider the discharge disposition reasonable. FINAL IMPRESSION      1. 2-part displaced fracture of surgical neck of right humerus, initial encounter for closed fracture    2. Abrasion of face, initial encounter    3. Closed head injury, initial encounter          DISPOSITION/PLAN   DISPOSITION Decision To Discharge 09/26/2019 01:35:22 PM      PATIENT REFERREDTO:  Ulices Manjarrez MD  [address] [place] [address], [address]  [address] [place] [address]  [phone]      surgery Tuesday at 12 pm, arrive at 10 am.  Nothing to eat or drink after midnight on Monday night      DISCHARGE MEDICATIONS:  New Prescriptions    TRAMADOL (ULTRAM) 50 MG TABLET    Take 1 tablet by mouth every 6 hours as needed for Pain for up to 3 days.        DISCONTINUED MEDICATIONS:  Discontinued Medications    No medications on file              (Please note that portions ofthis note were completed with a voice recognition program.  Efforts were made to edit the dictations but occasionally words are mis-transcribed.)    BIJAL Suarez CNP (electronically signed)           BIJAL Suarez CNP  09/26/19 [id number]

## 2019-09-26 NOTE — CONSULTS
Talks on phone: Not on file     Gets together: Not on file     Attends Buddhist service: Not on file     Active member of club or organization: Not on file     Attends meetings of clubs or organizations: Not on file     Relationship status: Not on file    Intimate partner violence:     Fear of current or ex partner: Not on file     Emotionally abused: Not on file     Physically abused: Not on file     Forced sexual activity: Not on file   Other Topics Concern    Not on file   Social History Narrative    Lives with room mate    Retired    Travels and golf       Family History:  Family History   Problem Relation Age of Onset    Coronary Art Dis Mother     Stroke Mother     Other Mother         gallstones    Coronary Art Dis Father     Heart Disease Father     Prostate Cancer Brother     High Cholesterol Brother     Other Brother         gallstones    Cancer Brother         prostate    Heart Disease Brother     Other Brother         gallstones    Cancer Brother         prostate       REVIEW OF SYSTEMS:   CONSTITUTIONAL: Denies unexplained weight loss, fevers, chills or fatigue  NEUROLOGICAL: Denies unsteady gait or progressive weakness    PSYCHOLOGICAL: Denies anxiety, depression   SKIN: Denies skin changes, delayed healing, rash, itching   HEMATOLOGIC: Denies easy bleeding or bruising  ENDOCRINE: Denies excessive thirst, urination, heat/cold  RESPIRATORY: Denies current dyspnea, cough  CARDIOVASCULAR: Negative for chest pain at this time. EYES: Negative for photophobia and visual disturbance. ENT:  Negative for rhinorrhea, epistaxis, sore throat, or hearing loss. GI: Denies nausea, vomiting, diarrhea   : Denies bowel or bladder issues   MUSCULOSKELETAL: Right shoulder pain. All other ROS reviewed in chart or with patient or family and are grossly negative. PHYSICAL EXAMINATION:  Ms. Brooklyn Christianson is a very pleasant 80 y.o. female who seen today in no acute distress, awake, alert, and oriented.   She

## 2019-09-26 NOTE — ED NOTES
Spling applied and checked by provider. Denies needs at this time. A&O with no signs of distress. Pt ambulated to exit.          Jake Alanis RN  09/26/19 [id number]

## 2019-09-26 NOTE — CARE COORDINATION
Discharge Planning:  SW met with pt to discuss discharge needs. Pt had a fall and broke her right arm today. Pt reported that her right arm is dominant but lives w/sister who was in the room and reported would be able to take care of pt's needs. No other needs reported at this time.     Electronically signed by NOEL Buck, LINDA on 9/26/2019 at 2:37 PM

## 2019-09-27 ENCOUNTER — ANESTHESIA EVENT (OUTPATIENT)
Dept: OPERATING ROOM | Age: 84
End: 2019-09-27
Payer: MEDICARE

## 2019-09-27 ENCOUNTER — HOSPITAL ENCOUNTER (OUTPATIENT)
Age: 84
Setting detail: OUTPATIENT SURGERY
Discharge: HOME | End: 2019-09-27
Attending: ORTHOPAEDIC SURGERY | Admitting: ORTHOPAEDIC SURGERY
Payer: MEDICARE

## 2019-09-27 ENCOUNTER — ANESTHESIA (OUTPATIENT)
Dept: OPERATING ROOM | Age: 84
End: 2019-09-27
Payer: MEDICARE

## 2019-09-27 ENCOUNTER — APPOINTMENT (OUTPATIENT)
Dept: GENERAL RADIOLOGY | Age: 84
End: 2019-09-27
Attending: ORTHOPAEDIC SURGERY
Payer: MEDICARE

## 2019-09-27 VITALS
WEIGHT: 125 LBS | BODY MASS INDEX: 24.54 KG/M2 | SYSTOLIC BLOOD PRESSURE: 123 MMHG | OXYGEN SATURATION: 92 % | TEMPERATURE: 97 F | DIASTOLIC BLOOD PRESSURE: 53 MMHG | HEART RATE: 66 BPM | HEIGHT: 60 IN | RESPIRATION RATE: 20 BRPM

## 2019-09-27 VITALS
RESPIRATION RATE: 12 BRPM | TEMPERATURE: 94.5 F | SYSTOLIC BLOOD PRESSURE: 134 MMHG | OXYGEN SATURATION: 100 % | DIASTOLIC BLOOD PRESSURE: 60 MMHG

## 2019-09-27 PROCEDURE — 2500000003 HC RX 250 WO HCPCS: Performed by: ORTHOPAEDIC SURGERY

## 2019-09-27 PROCEDURE — 7100000011 HC PHASE II RECOVERY - ADDTL 15 MIN: Performed by: ORTHOPAEDIC SURGERY

## 2019-09-27 PROCEDURE — 7100000010 HC PHASE II RECOVERY - FIRST 15 MIN: Performed by: ORTHOPAEDIC SURGERY

## 2019-09-27 PROCEDURE — 2580000003 HC RX 258: Performed by: ORTHOPAEDIC SURGERY

## 2019-09-27 PROCEDURE — 2500000003 HC RX 250 WO HCPCS: Performed by: NURSE ANESTHETIST, CERTIFIED REGISTERED

## 2019-09-27 PROCEDURE — 6360000002 HC RX W HCPCS: Performed by: ORTHOPAEDIC SURGERY

## 2019-09-27 PROCEDURE — 7100000001 HC PACU RECOVERY - ADDTL 15 MIN: Performed by: ORTHOPAEDIC SURGERY

## 2019-09-27 PROCEDURE — 3700000000 HC ANESTHESIA ATTENDED CARE: Performed by: ORTHOPAEDIC SURGERY

## 2019-09-27 PROCEDURE — 73030 X-RAY EXAM OF SHOULDER: CPT

## 2019-09-27 PROCEDURE — C1713 ANCHOR/SCREW BN/BN,TIS/BN: HCPCS | Performed by: ORTHOPAEDIC SURGERY

## 2019-09-27 PROCEDURE — 2580000003 HC RX 258: Performed by: NURSE ANESTHETIST, CERTIFIED REGISTERED

## 2019-09-27 PROCEDURE — 2720000010 HC SURG SUPPLY STERILE: Performed by: ORTHOPAEDIC SURGERY

## 2019-09-27 PROCEDURE — 23615 OPTX PROX HUMRL FX W/INT FIX: CPT | Performed by: ORTHOPAEDIC SURGERY

## 2019-09-27 PROCEDURE — 3700000001 HC ADD 15 MINUTES (ANESTHESIA): Performed by: ORTHOPAEDIC SURGERY

## 2019-09-27 PROCEDURE — 3600000014 HC SURGERY LEVEL 4 ADDTL 15MIN: Performed by: ORTHOPAEDIC SURGERY

## 2019-09-27 PROCEDURE — 3600000004 HC SURGERY LEVEL 4 BASE: Performed by: ORTHOPAEDIC SURGERY

## 2019-09-27 PROCEDURE — 2709999900 HC NON-CHARGEABLE SUPPLY: Performed by: ORTHOPAEDIC SURGERY

## 2019-09-27 PROCEDURE — 7100000000 HC PACU RECOVERY - FIRST 15 MIN: Performed by: ORTHOPAEDIC SURGERY

## 2019-09-27 PROCEDURE — 6360000002 HC RX W HCPCS: Performed by: NURSE ANESTHETIST, CERTIFIED REGISTERED

## 2019-09-27 PROCEDURE — 3209999900 FLUORO FOR SURGICAL PROCEDURES

## 2019-09-27 DEVICE — LOCKING SCREW
Type: IMPLANTABLE DEVICE | Site: HUMERUS | Status: FUNCTIONAL
Brand: AXSOS

## 2019-09-27 DEVICE — CORTEX SCREW
Type: IMPLANTABLE DEVICE | Site: HUMERUS | Status: FUNCTIONAL
Brand: AXSOS

## 2019-09-27 DEVICE — PROXIMAL LATERAL HUMERUS PLATE, RIGHT
Type: IMPLANTABLE DEVICE | Site: HUMERUS | Status: FUNCTIONAL
Brand: AXSOS

## 2019-09-27 DEVICE — UNTHREADED GUIDE WIRE
Type: IMPLANTABLE DEVICE | Site: HUMERUS | Status: FUNCTIONAL
Brand: FIXOS

## 2019-09-27 DEVICE — SCREW 3.5 CORTX L24MM: Type: IMPLANTABLE DEVICE | Site: HUMERUS | Status: FUNCTIONAL

## 2019-09-27 RX ORDER — ONDANSETRON 2 MG/ML
4 INJECTION INTRAMUSCULAR; INTRAVENOUS
Status: DISCONTINUED | OUTPATIENT
Start: 2019-09-27 | End: 2019-09-27 | Stop reason: HOSPADM

## 2019-09-27 RX ORDER — EPHEDRINE SULFATE/0.9% NACL/PF 50 MG/5 ML
SYRINGE (ML) INTRAVENOUS PRN
Status: DISCONTINUED | OUTPATIENT
Start: 2019-09-27 | End: 2019-09-27 | Stop reason: SDUPTHER

## 2019-09-27 RX ORDER — PHENYLEPHRINE HCL IN 0.9% NACL 1 MG/10 ML
SYRINGE (ML) INTRAVENOUS PRN
Status: DISCONTINUED | OUTPATIENT
Start: 2019-09-27 | End: 2019-09-27 | Stop reason: SDUPTHER

## 2019-09-27 RX ORDER — ROCURONIUM BROMIDE 10 MG/ML
INJECTION, SOLUTION INTRAVENOUS PRN
Status: DISCONTINUED | OUTPATIENT
Start: 2019-09-27 | End: 2019-09-27 | Stop reason: SDUPTHER

## 2019-09-27 RX ORDER — OXYCODONE HYDROCHLORIDE AND ACETAMINOPHEN 5; 325 MG/1; MG/1
2 TABLET ORAL PRN
Status: DISCONTINUED | OUTPATIENT
Start: 2019-09-27 | End: 2019-09-27 | Stop reason: HOSPADM

## 2019-09-27 RX ORDER — CEPHALEXIN 500 MG/1
500 CAPSULE ORAL 4 TIMES DAILY
Qty: 20 CAPSULE | Refills: 0 | Status: SHIPPED | OUTPATIENT
Start: 2019-09-27 | End: 2019-10-02

## 2019-09-27 RX ORDER — FENTANYL CITRATE 50 UG/ML
INJECTION, SOLUTION INTRAMUSCULAR; INTRAVENOUS PRN
Status: DISCONTINUED | OUTPATIENT
Start: 2019-09-27 | End: 2019-09-27 | Stop reason: SDUPTHER

## 2019-09-27 RX ORDER — SODIUM CHLORIDE 9 MG/ML
INJECTION, SOLUTION INTRAVENOUS CONTINUOUS
Status: CANCELLED | OUTPATIENT
Start: 2019-09-27

## 2019-09-27 RX ORDER — OXYCODONE HYDROCHLORIDE AND ACETAMINOPHEN 5; 325 MG/1; MG/1
1 TABLET ORAL PRN
Status: DISCONTINUED | OUTPATIENT
Start: 2019-09-27 | End: 2019-09-27 | Stop reason: HOSPADM

## 2019-09-27 RX ORDER — PROPOFOL 10 MG/ML
INJECTION, EMULSION INTRAVENOUS PRN
Status: DISCONTINUED | OUTPATIENT
Start: 2019-09-27 | End: 2019-09-27 | Stop reason: SDUPTHER

## 2019-09-27 RX ORDER — BUPIVACAINE HYDROCHLORIDE 5 MG/ML
INJECTION, SOLUTION EPIDURAL; INTRACAUDAL
Status: COMPLETED | OUTPATIENT
Start: 2019-09-27 | End: 2019-09-27

## 2019-09-27 RX ORDER — FENTANYL CITRATE 50 UG/ML
25 INJECTION, SOLUTION INTRAMUSCULAR; INTRAVENOUS EVERY 5 MIN PRN
Status: DISCONTINUED | OUTPATIENT
Start: 2019-09-27 | End: 2019-09-27 | Stop reason: HOSPADM

## 2019-09-27 RX ORDER — SODIUM CHLORIDE 0.9 % (FLUSH) 0.9 %
10 SYRINGE (ML) INJECTION EVERY 12 HOURS SCHEDULED
Status: CANCELLED | OUTPATIENT
Start: 2019-09-27

## 2019-09-27 RX ORDER — LIDOCAINE HYDROCHLORIDE 20 MG/ML
INJECTION, SOLUTION EPIDURAL; INFILTRATION; INTRACAUDAL; PERINEURAL PRN
Status: DISCONTINUED | OUTPATIENT
Start: 2019-09-27 | End: 2019-09-27 | Stop reason: SDUPTHER

## 2019-09-27 RX ORDER — KETAMINE HCL IN NACL, ISO-OSM 100MG/10ML
SYRINGE (ML) INJECTION PRN
Status: DISCONTINUED | OUTPATIENT
Start: 2019-09-27 | End: 2019-09-27 | Stop reason: SDUPTHER

## 2019-09-27 RX ORDER — SODIUM CHLORIDE 9 MG/ML
INJECTION, SOLUTION INTRAVENOUS CONTINUOUS PRN
Status: DISCONTINUED | OUTPATIENT
Start: 2019-09-27 | End: 2019-09-27 | Stop reason: SDUPTHER

## 2019-09-27 RX ORDER — SODIUM CHLORIDE 0.9 % (FLUSH) 0.9 %
10 SYRINGE (ML) INJECTION PRN
Status: CANCELLED | OUTPATIENT
Start: 2019-09-27

## 2019-09-27 RX ADMIN — Medication 2 G: at 11:00

## 2019-09-27 RX ADMIN — FENTANYL CITRATE 50 MCG: 50 INJECTION INTRAMUSCULAR; INTRAVENOUS at 11:08

## 2019-09-27 RX ADMIN — SUGAMMADEX 150 MG: 100 INJECTION, SOLUTION INTRAVENOUS at 12:34

## 2019-09-27 RX ADMIN — Medication 20 MG: at 11:08

## 2019-09-27 RX ADMIN — Medication 200 MCG: at 11:17

## 2019-09-27 RX ADMIN — PROPOFOL 80 MG: 10 INJECTION, EMULSION INTRAVENOUS at 11:08

## 2019-09-27 RX ADMIN — Medication 200 MCG: at 11:36

## 2019-09-27 RX ADMIN — ROCURONIUM BROMIDE 25 MG: 10 INJECTION INTRAVENOUS at 11:09

## 2019-09-27 RX ADMIN — LIDOCAINE HYDROCHLORIDE 40 MG: 20 INJECTION, SOLUTION EPIDURAL; INFILTRATION; INTRACAUDAL; PERINEURAL at 11:08

## 2019-09-27 RX ADMIN — Medication 20 MG: at 11:38

## 2019-09-27 RX ADMIN — SODIUM CHLORIDE: 9 INJECTION, SOLUTION INTRAVENOUS at 11:02

## 2019-09-27 ASSESSMENT — PULMONARY FUNCTION TESTS
PIF_VALUE: 24
PIF_VALUE: 15
PIF_VALUE: 28
PIF_VALUE: 10
PIF_VALUE: 34
PIF_VALUE: 31
PIF_VALUE: 15
PIF_VALUE: 1
PIF_VALUE: 15
PIF_VALUE: 4
PIF_VALUE: 31
PIF_VALUE: 12
PIF_VALUE: 33
PIF_VALUE: 15
PIF_VALUE: 16
PIF_VALUE: 0
PIF_VALUE: 31
PIF_VALUE: 4
PIF_VALUE: 33
PIF_VALUE: 32
PIF_VALUE: 31
PIF_VALUE: 15
PIF_VALUE: 31
PIF_VALUE: 31
PIF_VALUE: 30
PIF_VALUE: 15
PIF_VALUE: 31
PIF_VALUE: 10
PIF_VALUE: 30
PIF_VALUE: 31
PIF_VALUE: 32
PIF_VALUE: 0
PIF_VALUE: 30
PIF_VALUE: 16
PIF_VALUE: 29
PIF_VALUE: 32
PIF_VALUE: 15
PIF_VALUE: 32
PIF_VALUE: 33
PIF_VALUE: 32
PIF_VALUE: 31
PIF_VALUE: 30
PIF_VALUE: 31
PIF_VALUE: 1
PIF_VALUE: 31
PIF_VALUE: 15
PIF_VALUE: 34
PIF_VALUE: 3
PIF_VALUE: 1
PIF_VALUE: 10
PIF_VALUE: 31
PIF_VALUE: 1
PIF_VALUE: 30
PIF_VALUE: 15
PIF_VALUE: 31
PIF_VALUE: 0
PIF_VALUE: 16
PIF_VALUE: 1
PIF_VALUE: 16
PIF_VALUE: 31
PIF_VALUE: 33
PIF_VALUE: 33
PIF_VALUE: 31
PIF_VALUE: 32
PIF_VALUE: 3
PIF_VALUE: 15
PIF_VALUE: 15
PIF_VALUE: 33
PIF_VALUE: 15
PIF_VALUE: 5
PIF_VALUE: 32
PIF_VALUE: 30
PIF_VALUE: 16
PIF_VALUE: 9
PIF_VALUE: 33
PIF_VALUE: 4
PIF_VALUE: 20
PIF_VALUE: 27
PIF_VALUE: 31
PIF_VALUE: 15
PIF_VALUE: 15
PIF_VALUE: 30
PIF_VALUE: 31
PIF_VALUE: 1
PIF_VALUE: 36
PIF_VALUE: 34
PIF_VALUE: 30
PIF_VALUE: 34
PIF_VALUE: 30
PIF_VALUE: 0
PIF_VALUE: 16
PIF_VALUE: 32
PIF_VALUE: 3
PIF_VALUE: 31
PIF_VALUE: 0
PIF_VALUE: 31
PIF_VALUE: 32
PIF_VALUE: 30
PIF_VALUE: 31
PIF_VALUE: 31
PIF_VALUE: 16

## 2019-09-27 ASSESSMENT — PAIN - FUNCTIONAL ASSESSMENT: PAIN_FUNCTIONAL_ASSESSMENT: 0-10

## 2019-09-27 ASSESSMENT — PAIN SCALES - GENERAL
PAINLEVEL_OUTOF10: 0

## 2019-09-27 NOTE — PROGRESS NOTES
Pt tolerating snack and chair. Discharge instructions given. Pt and friend verbalized understanding. No pain. Stable for discharge.
To acu for DC. Pt free of distress.   Electronically signed by Akil López RN on 9/27/2019 at 1:32 PM
[facility] phone number:  [phone] PAT fax number:  [phone]  Please note these are generalized instructions for all surgical cases, you may be provided with more specific instructions according to your surgery.

## 2019-09-27 NOTE — ANESTHESIA POSTPROCEDURE EVALUATION
Department of Anesthesiology  Postprocedure Note    Patient: Alejandra Quevedo  MRN: [id number]  YOB: 1933  Date of evaluation: 9/27/2019  Time:  1:59 PM     Procedure Summary     Date:  09/27/19 Room / Location:  [facility] OR 02 / [facility] OR    Anesthesia Start:  4193 Anesthesia Stop:  6109    Procedure:  OPEN REDUCTION INTERNAL FIXATION RIGHT PROXIMAL HUMERUS WITH C-ARM (Right ) Diagnosis:  (RIGHT PROXIMAL HUMERUS FRACTURE)    Surgeon:  Jazmin Son MD Responsible Provider:  Heather Carvalho MD    Anesthesia Type:  general ASA Status:  3          Anesthesia Type: general    Katerina Phase I: Katerina Score: 8    Katerina Phase II: Katerina Score: 10    Last vitals: Reviewed and per EMR flowsheets.        Anesthesia Post Evaluation    Patient location during evaluation: bedside  Patient participation: complete - patient participated  Level of consciousness: awake  Pain score: 1  Airway patency: patent  Nausea & Vomiting: no nausea and no vomiting  Complications: no  Cardiovascular status: blood pressure returned to baseline  Respiratory status: acceptable  Hydration status: euvolemic

## 2019-09-27 NOTE — ANESTHESIA PRE PROCEDURE
[facility] Department of Anesthesiology  Pre-Anesthesia Evaluation/Consultation       Name:  Pascale Gandhi  : 1933  Age:  80 y.o. MRN:  [id number]  Date: 2019           Surgeon: Surgeon(s):  Silvina Adame MD    Procedure: Procedure(s):  OPEN REDUCTION INTERNAL FIXATION RIGHT PROXIMAL HUMERUS WITH C-ARM     Allergies   Allergen Reactions    Lipitor [Atorvastatin] Hives     Patient Active Problem List   Diagnosis    Coronary artery disease    Hyperlipidemia    Essential hypertension    Thyroid nodule    Tympanic membrane perforation    Coronary atherosclerosis    GERD (gastroesophageal reflux disease)    Pemphigoid    Hypothyroidism    Stage 3 chronic kidney disease (HCC)    OA (osteoarthritis) of knee    IPF (idiopathic pulmonary fibrosis) (HCC)    Exercise hypoxemia    Itching    Tinea corporis     Past Medical History:   Diagnosis Date    Bilateral cataracts 2015    Cholelithiasis     Coronary artery disease     Hyperlipidemia     Hypertension     Hypothyroid 2014    IPF (idiopathic pulmonary fibrosis) ([facility] [address] 2017    Thyroid nodule     Wears dentures     upper    Wears glasses      Past Surgical History:   Procedure Laterality Date    CHOLECYSTECTOMY, LAPAROSCOPIC  2012    MULTIPORT ROBOTIC ASSISTED LAPAROSCOPIC CHOLECYSTECTOMY WITH    CORONARY ANGIOPLASTY WITH STENT PLACEMENT  2003    THYROIDECTOMY, PARTIAL  1985    parathyroidectomy    TONSILLECTOMY      WISDOM TOOTH EXTRACTION       Social History     Tobacco Use    Smoking status: Former Smoker     Last attempt to quit: 1985     Years since quittin.1    Smokeless tobacco: Never Used   Substance Use Topics    Alcohol use: Not Currently    Drug use: No     Medications  No current facility-administered medications on file prior to encounter.       Current Outpatient Medications on File Prior to Encounter   Medication Sig Dispense Refill 75 MG tablet Take 1 tablet by mouth daily 90 tablet 3    metoprolol tartrate (LOPRESSOR) 25 MG tablet TAKE ONE AND ONE-HALF TABLET BY MOUTH TWICE A  tablet 1    isosorbide mononitrate (IMDUR) 30 MG extended release tablet TAKE ONE TABLET BY MOUTH DAILY 90 tablet 3    ketoconazole (NIZORAL) 2 % cream Apply topically daily. (Patient taking differently: as needed Apply topically daily.) 30 g 1    aspirin 81 MG chewable tablet Take 81 mg by mouth daily.  omeprazole (PRILOSEC) 20 MG capsule Take 20 mg by mouth daily.  Handicap Placard MISC by Does not apply route Duration: 5 years 1 each 0    nitroGLYCERIN (NITROSTAT) 0.4 MG SL tablet Place 1 tablet under the tongue every 5 minutes as needed for Chest pain 25 tablet 3     Vital Signs (Current)   Vitals:    19 1617   Weight: 125 lb (56.7 kg)   Height: 5' (1.524 m)                                          BP Readings from Last 3 Encounters:   19 (!) 152/65   19 124/62   19 130/62     Vital Signs Statistics (for past 48 hrs)     Temp  Av.6 °F (36.4 °C)  Min: 97.6 °F (36.4 °C)   Min taken time: 19 1052  Max: 97.6 °F (36.4 °C)   Max taken time: 19 1052  Pulse  Av  Min: 71   Min taken time: 19 1052  Max: 71   Max taken time: 19 1052  Resp  Av  Min: 16   Min taken time: 19 1052  Max: 16   Max taken time: 19 1052  BP  Min: 135/67   Min taken time: 19 1052  Max: 152/65   Max taken time: 19 1230  SpO2  Av %  Min: 95 %   Min taken time: 19 1230  Max: 95 %   Max taken time: 19 1230  BP Readings from Last 3 Encounters:   19 (!) 152/65   19 124/62   19 130/62       BMI  Body mass index is 24.41 kg/m². Estimated body mass index is 24.41 kg/m² as calculated from the following:    Height as of this encounter: 5' (1.524 m). Weight as of this encounter: 125 lb (56.7 kg).     CBC   Lab Results   Component Value Date    WBC 7.3 2019    RBC 4.09 03/26/2019    HGB 12.4 03/26/2019    HCT 38.1 03/26/2019    MCV 93.2 03/26/2019    RDW 14.4 03/26/2019     03/26/2019     CMP    Lab Results   Component Value Date     03/26/2019    K 4.7 03/26/2019     03/26/2019    CO2 24 03/26/2019    BUN 25 03/26/2019    CREATININE 1.2 03/26/2019    GFRAA 52 03/26/2019    GFRAA 51 08/10/2012    AGRATIO 1.2 03/26/2019    LABGLOM 43 03/26/2019    LABGLOM 41 06/12/2018    GLUCOSE 105 03/26/2019    GLUCOSE 109 04/26/2012    PROT 7.1 03/26/2019    PROT 6.5 12/28/2012    CALCIUM 9.6 03/26/2019    BILITOT <0.2 03/26/2019    ALKPHOS 92 03/26/2019    AST 15 03/26/2019    ALT 7 03/26/2019     BMP    Lab Results   Component Value Date     03/26/2019    K 4.7 03/26/2019     03/26/2019    CO2 24 03/26/2019    BUN 25 03/26/2019    CREATININE 1.2 03/26/2019    CALCIUM 9.6 03/26/2019    GFRAA 52 03/26/2019    GFRAA 51 08/10/2012    LABGLOM 43 03/26/2019    LABGLOM 41 06/12/2018    GLUCOSE 105 03/26/2019    GLUCOSE 109 04/26/2012     POCGlucose  No results for input(s): GLUCOSE in the last 72 hours.    Coags  No results found for: PROTIME, INR, APTT  HCG (If Applicable) No results found for: PREGTESTUR, PREGSERUM, HCG, HCGQUANT   ABGs No results found for: PHART, PO2ART, OOU0VAR, LNY8FYU, BEART, R2RNCFXN   Type & Screen (If Applicable)  No results found for: LABABO, LABRH                         BMI: Wt Readings from Last 3 Encounters:       NPO Status:                          Anesthesia Evaluation  Patient summary reviewed no history of anesthetic complications:   Airway: Mallampati: II  TM distance: >3 FB     Mouth opening: > = 3 FB Dental:          Pulmonary:   (+) pneumonia:                            ROS comment: IPF   Cardiovascular:    (+) hypertension:, CAD:,     (-) pacemaker, CABG/stent and dysrhythmias                Neuro/Psych:      (-) seizures and CVA           GI/Hepatic/Renal:   (+) GERD:, renal disease: CRI,           Endo/Other:    (+)

## 2019-10-10 ENCOUNTER — OFFICE VISIT (OUTPATIENT)
Dept: ORTHOPEDIC SURGERY | Age: 84
End: 2019-10-10

## 2019-10-10 ENCOUNTER — OFFICE VISIT (OUTPATIENT)
Dept: CARDIOLOGY CLINIC | Age: 84
End: 2019-10-10
Payer: MEDICARE

## 2019-10-10 VITALS
DIASTOLIC BLOOD PRESSURE: 70 MMHG | HEART RATE: 60 BPM | BODY MASS INDEX: 24.74 KG/M2 | SYSTOLIC BLOOD PRESSURE: 120 MMHG | HEIGHT: 60 IN | WEIGHT: 126 LBS

## 2019-10-10 VITALS
BODY MASS INDEX: 24.69 KG/M2 | WEIGHT: 126.4 LBS | SYSTOLIC BLOOD PRESSURE: 120 MMHG | HEART RATE: 60 BPM | DIASTOLIC BLOOD PRESSURE: 70 MMHG

## 2019-10-10 DIAGNOSIS — S42.201A CLOSED FRACTURE OF PROXIMAL END OF RIGHT HUMERUS, UNSPECIFIED FRACTURE MORPHOLOGY, INITIAL ENCOUNTER: Primary | ICD-10-CM

## 2019-10-10 DIAGNOSIS — J84.9 INTERSTITIAL LUNG DISEASE (HCC): ICD-10-CM

## 2019-10-10 DIAGNOSIS — R06.02 SOB (SHORTNESS OF BREATH): ICD-10-CM

## 2019-10-10 DIAGNOSIS — I10 ESSENTIAL HYPERTENSION: Primary | ICD-10-CM

## 2019-10-10 DIAGNOSIS — I10 ESSENTIAL HYPERTENSION: ICD-10-CM

## 2019-10-10 DIAGNOSIS — I25.10 CAD IN NATIVE ARTERY: ICD-10-CM

## 2019-10-10 DIAGNOSIS — E78.5 HYPERLIPIDEMIA, UNSPECIFIED HYPERLIPIDEMIA TYPE: ICD-10-CM

## 2019-10-10 PROCEDURE — APPNB15 APP NON BILLABLE TIME 0-15 MINS: Performed by: NURSE PRACTITIONER

## 2019-10-10 PROCEDURE — G8484 FLU IMMUNIZE NO ADMIN: HCPCS | Performed by: INTERNAL MEDICINE

## 2019-10-10 PROCEDURE — 1123F ACP DISCUSS/DSCN MKR DOCD: CPT | Performed by: INTERNAL MEDICINE

## 2019-10-10 PROCEDURE — 1090F PRES/ABSN URINE INCON ASSESS: CPT | Performed by: INTERNAL MEDICINE

## 2019-10-10 PROCEDURE — G8427 DOCREV CUR MEDS BY ELIG CLIN: HCPCS | Performed by: INTERNAL MEDICINE

## 2019-10-10 PROCEDURE — 4040F PNEUMOC VAC/ADMIN/RCVD: CPT | Performed by: INTERNAL MEDICINE

## 2019-10-10 PROCEDURE — 1036F TOBACCO NON-USER: CPT | Performed by: INTERNAL MEDICINE

## 2019-10-10 PROCEDURE — G8420 CALC BMI NORM PARAMETERS: HCPCS | Performed by: INTERNAL MEDICINE

## 2019-10-10 PROCEDURE — 99024 POSTOP FOLLOW-UP VISIT: CPT | Performed by: NURSE PRACTITIONER

## 2019-10-10 PROCEDURE — G8598 ASA/ANTIPLAT THER USED: HCPCS | Performed by: INTERNAL MEDICINE

## 2019-10-10 PROCEDURE — 99214 OFFICE O/P EST MOD 30 MIN: CPT | Performed by: INTERNAL MEDICINE

## 2019-10-10 RX ORDER — NITROGLYCERIN 0.4 MG/1
0.4 TABLET SUBLINGUAL EVERY 5 MIN PRN
Qty: 25 TABLET | Refills: 3 | Status: SHIPPED | OUTPATIENT
Start: 2019-10-10 | End: 2020-12-09

## 2019-10-10 ASSESSMENT — ENCOUNTER SYMPTOMS
SHORTNESS OF BREATH: 1
CHEST TIGHTNESS: 0
COUGH: 0
CHOKING: 0

## 2019-10-11 DIAGNOSIS — J84.112 IPF (IDIOPATHIC PULMONARY FIBROSIS) (HCC): Primary | ICD-10-CM

## 2019-10-15 ENCOUNTER — TELEPHONE (OUTPATIENT)
Dept: PULMONOLOGY | Age: 84
End: 2019-10-15

## 2019-10-23 ENCOUNTER — TELEPHONE (OUTPATIENT)
Dept: FAMILY MEDICINE CLINIC | Age: 84
End: 2019-10-23

## 2019-10-24 ENCOUNTER — NURSE ONLY (OUTPATIENT)
Dept: FAMILY MEDICINE CLINIC | Age: 84
End: 2019-10-24
Payer: MEDICARE

## 2019-10-24 DIAGNOSIS — Z23 NEED FOR INFLUENZA VACCINATION: Primary | ICD-10-CM

## 2019-10-24 PROCEDURE — G0008 ADMIN INFLUENZA VIRUS VAC: HCPCS | Performed by: FAMILY MEDICINE

## 2019-10-24 PROCEDURE — 90653 IIV ADJUVANT VACCINE IM: CPT | Performed by: FAMILY MEDICINE

## 2019-11-18 ENCOUNTER — HOSPITAL ENCOUNTER (INPATIENT)
Age: 84
LOS: 1 days | Discharge: HOME HEALTH | DRG: 066 | End: 2019-11-20
Attending: EMERGENCY MEDICINE | Admitting: INTERNAL MEDICINE
Payer: MEDICARE

## 2019-11-18 ENCOUNTER — APPOINTMENT (OUTPATIENT)
Dept: CT IMAGING | Age: 84
DRG: 066 | End: 2019-11-18
Payer: MEDICARE

## 2019-11-18 ENCOUNTER — APPOINTMENT (OUTPATIENT)
Dept: MRI IMAGING | Age: 84
DRG: 066 | End: 2019-11-18
Payer: MEDICARE

## 2019-11-18 DIAGNOSIS — G45.9 TIA (TRANSIENT ISCHEMIC ATTACK): Primary | ICD-10-CM

## 2019-11-18 LAB
ANION GAP SERPL CALCULATED.3IONS-SCNC: 11 MMOL/L (ref 3–16)
APTT: 26.7 SEC (ref 26–36)
BASOPHILS ABSOLUTE: 0 K/UL (ref 0–0.2)
BASOPHILS RELATIVE PERCENT: 0.7 %
BUN BLDV-MCNC: 14 MG/DL (ref 7–20)
CALCIUM SERPL-MCNC: 9.7 MG/DL (ref 8.3–10.6)
CHLORIDE BLD-SCNC: 100 MMOL/L (ref 99–110)
CO2: 24 MMOL/L (ref 21–32)
CREAT SERPL-MCNC: 1 MG/DL (ref 0.6–1.2)
EKG ATRIAL RATE: 78 BPM
EKG DIAGNOSIS: NORMAL
EKG P AXIS: 34 DEGREES
EKG P-R INTERVAL: 178 MS
EKG Q-T INTERVAL: 386 MS
EKG QRS DURATION: 84 MS
EKG QTC CALCULATION (BAZETT): 440 MS
EKG R AXIS: -41 DEGREES
EKG T AXIS: 27 DEGREES
EKG VENTRICULAR RATE: 78 BPM
EOSINOPHILS ABSOLUTE: 0.1 K/UL (ref 0–0.6)
EOSINOPHILS RELATIVE PERCENT: 1.3 %
GFR AFRICAN AMERICAN: >60
GFR NON-AFRICAN AMERICAN: 53
GLUCOSE BLD-MCNC: 103 MG/DL (ref 70–99)
GLUCOSE BLD-MCNC: 118 MG/DL (ref 70–99)
HCT VFR BLD CALC: 39.2 % (ref 36–48)
HEMOGLOBIN: 12.9 G/DL (ref 12–16)
INR BLD: 0.94 (ref 0.86–1.14)
LYMPHOCYTES ABSOLUTE: 0.6 K/UL (ref 1–5.1)
LYMPHOCYTES RELATIVE PERCENT: 9 %
MCH RBC QN AUTO: 30.9 PG (ref 26–34)
MCHC RBC AUTO-ENTMCNC: 32.8 G/DL (ref 31–36)
MCV RBC AUTO: 94.2 FL (ref 80–100)
MONOCYTES ABSOLUTE: 0.4 K/UL (ref 0–1.3)
MONOCYTES RELATIVE PERCENT: 6.4 %
NEUTROPHILS ABSOLUTE: 5.2 K/UL (ref 1.7–7.7)
NEUTROPHILS RELATIVE PERCENT: 82.6 %
PDW BLD-RTO: 17.2 % (ref 12.4–15.4)
PERFORMED ON: ABNORMAL
PLATELET # BLD: 276 K/UL (ref 135–450)
PMV BLD AUTO: 7.1 FL (ref 5–10.5)
POTASSIUM REFLEX MAGNESIUM: 4.8 MMOL/L (ref 3.5–5.1)
PROTHROMBIN TIME: 10.7 SEC (ref 9.8–13)
RBC # BLD: 4.17 M/UL (ref 4–5.2)
SODIUM BLD-SCNC: 135 MMOL/L (ref 136–145)
TROPONIN: <0.01 NG/ML
WBC # BLD: 6.3 K/UL (ref 4–11)

## 2019-11-18 PROCEDURE — 94760 N-INVAS EAR/PLS OXIMETRY 1: CPT

## 2019-11-18 PROCEDURE — G0378 HOSPITAL OBSERVATION PER HR: HCPCS

## 2019-11-18 PROCEDURE — 70450 CT HEAD/BRAIN W/O DYE: CPT

## 2019-11-18 PROCEDURE — 85730 THROMBOPLASTIN TIME PARTIAL: CPT

## 2019-11-18 PROCEDURE — 6370000000 HC RX 637 (ALT 250 FOR IP): Performed by: EMERGENCY MEDICINE

## 2019-11-18 PROCEDURE — 85025 COMPLETE CBC W/AUTO DIFF WBC: CPT

## 2019-11-18 PROCEDURE — 6360000004 HC RX CONTRAST MEDICATION: Performed by: EMERGENCY MEDICINE

## 2019-11-18 PROCEDURE — 99291 CRITICAL CARE FIRST HOUR: CPT

## 2019-11-18 PROCEDURE — 6370000000 HC RX 637 (ALT 250 FOR IP): Performed by: INTERNAL MEDICINE

## 2019-11-18 PROCEDURE — 36415 COLL VENOUS BLD VENIPUNCTURE: CPT

## 2019-11-18 PROCEDURE — 93010 ELECTROCARDIOGRAM REPORT: CPT | Performed by: INTERNAL MEDICINE

## 2019-11-18 PROCEDURE — 6360000002 HC RX W HCPCS: Performed by: INTERNAL MEDICINE

## 2019-11-18 PROCEDURE — 96372 THER/PROPH/DIAG INJ SC/IM: CPT

## 2019-11-18 PROCEDURE — 2580000003 HC RX 258: Performed by: INTERNAL MEDICINE

## 2019-11-18 PROCEDURE — 70551 MRI BRAIN STEM W/O DYE: CPT

## 2019-11-18 PROCEDURE — 70496 CT ANGIOGRAPHY HEAD: CPT

## 2019-11-18 PROCEDURE — 85610 PROTHROMBIN TIME: CPT

## 2019-11-18 PROCEDURE — 93005 ELECTROCARDIOGRAM TRACING: CPT | Performed by: EMERGENCY MEDICINE

## 2019-11-18 PROCEDURE — 84484 ASSAY OF TROPONIN QUANT: CPT

## 2019-11-18 PROCEDURE — 80048 BASIC METABOLIC PNL TOTAL CA: CPT

## 2019-11-18 RX ORDER — FOLIC ACID 1 MG/1
1000 TABLET ORAL 2 TIMES DAILY
Status: DISCONTINUED | OUTPATIENT
Start: 2019-11-18 | End: 2019-11-20 | Stop reason: HOSPADM

## 2019-11-18 RX ORDER — ASPIRIN 81 MG/1
324 TABLET, CHEWABLE ORAL ONCE
Status: COMPLETED | OUTPATIENT
Start: 2019-11-18 | End: 2019-11-18

## 2019-11-18 RX ORDER — ONDANSETRON 2 MG/ML
4 INJECTION INTRAMUSCULAR; INTRAVENOUS EVERY 6 HOURS PRN
Status: DISCONTINUED | OUTPATIENT
Start: 2019-11-18 | End: 2019-11-20 | Stop reason: HOSPADM

## 2019-11-18 RX ORDER — SODIUM CHLORIDE 0.9 % (FLUSH) 0.9 %
10 SYRINGE (ML) INJECTION PRN
Status: DISCONTINUED | OUTPATIENT
Start: 2019-11-18 | End: 2019-11-20 | Stop reason: HOSPADM

## 2019-11-18 RX ORDER — ISOSORBIDE MONONITRATE 30 MG/1
30 TABLET, EXTENDED RELEASE ORAL DAILY
Status: DISCONTINUED | OUTPATIENT
Start: 2019-11-19 | End: 2019-11-20 | Stop reason: HOSPADM

## 2019-11-18 RX ORDER — ACETAMINOPHEN 325 MG/1
650 TABLET ORAL EVERY 4 HOURS PRN
Status: DISCONTINUED | OUTPATIENT
Start: 2019-11-18 | End: 2019-11-20 | Stop reason: HOSPADM

## 2019-11-18 RX ORDER — NITROGLYCERIN 0.4 MG/1
0.4 TABLET SUBLINGUAL EVERY 5 MIN PRN
Status: DISCONTINUED | OUTPATIENT
Start: 2019-11-18 | End: 2019-11-20 | Stop reason: HOSPADM

## 2019-11-18 RX ORDER — ACETAMINOPHEN 325 MG/1
TABLET ORAL
Status: DISPENSED
Start: 2019-11-18 | End: 2019-11-19

## 2019-11-18 RX ORDER — ROSUVASTATIN CALCIUM 40 MG/1
40 TABLET, COATED ORAL NIGHTLY
Status: DISCONTINUED | OUTPATIENT
Start: 2019-11-18 | End: 2019-11-20 | Stop reason: HOSPADM

## 2019-11-18 RX ORDER — PANTOPRAZOLE SODIUM 40 MG/1
40 TABLET, DELAYED RELEASE ORAL
Status: DISCONTINUED | OUTPATIENT
Start: 2019-11-19 | End: 2019-11-20 | Stop reason: HOSPADM

## 2019-11-18 RX ORDER — SODIUM CHLORIDE 0.9 % (FLUSH) 0.9 %
10 SYRINGE (ML) INJECTION EVERY 12 HOURS SCHEDULED
Status: DISCONTINUED | OUTPATIENT
Start: 2019-11-18 | End: 2019-11-20 | Stop reason: HOSPADM

## 2019-11-18 RX ORDER — LEVOTHYROXINE SODIUM 0.03 MG/1
50 TABLET ORAL DAILY
Status: DISCONTINUED | OUTPATIENT
Start: 2019-11-19 | End: 2019-11-20 | Stop reason: HOSPADM

## 2019-11-18 RX ADMIN — ACETAMINOPHEN 650 MG: 325 TABLET, FILM COATED ORAL at 22:58

## 2019-11-18 RX ADMIN — ROSUVASTATIN CALCIUM 40 MG: 40 TABLET, FILM COATED ORAL at 22:13

## 2019-11-18 RX ADMIN — ENOXAPARIN SODIUM 40 MG: 40 INJECTION SUBCUTANEOUS at 22:14

## 2019-11-18 RX ADMIN — ACETAMINOPHEN 650 MG: 325 TABLET, FILM COATED ORAL at 13:45

## 2019-11-18 RX ADMIN — IOPAMIDOL 75 ML: 755 INJECTION, SOLUTION INTRAVENOUS at 11:01

## 2019-11-18 RX ADMIN — ASPIRIN 81 MG 243 MG: 81 TABLET ORAL at 14:16

## 2019-11-18 RX ADMIN — Medication 10 ML: at 22:13

## 2019-11-18 RX ADMIN — METOPROLOL TARTRATE 37.5 MG: 25 TABLET, FILM COATED ORAL at 22:13

## 2019-11-18 ASSESSMENT — PAIN DESCRIPTION - DESCRIPTORS: DESCRIPTORS: HEADACHE

## 2019-11-18 ASSESSMENT — PAIN SCALES - GENERAL
PAINLEVEL_OUTOF10: 2
PAINLEVEL_OUTOF10: 7
PAINLEVEL_OUTOF10: 3

## 2019-11-19 ENCOUNTER — TELEPHONE (OUTPATIENT)
Dept: FAMILY MEDICINE CLINIC | Age: 84
End: 2019-11-19

## 2019-11-19 PROBLEM — I63.81 STROKE, LACUNAR (HCC): Status: ACTIVE | Noted: 2019-11-19

## 2019-11-19 LAB
CHOLESTEROL, TOTAL: 172 MG/DL (ref 0–199)
HDLC SERPL-MCNC: 78 MG/DL (ref 40–60)
LDL CHOLESTEROL CALCULATED: 76 MG/DL
LV EF: 65 %
LVEF MODALITY: NORMAL
TRIGL SERPL-MCNC: 89 MG/DL (ref 0–150)
VLDLC SERPL CALC-MCNC: 18 MG/DL

## 2019-11-19 PROCEDURE — 92523 SPEECH SOUND LANG COMPREHEN: CPT

## 2019-11-19 PROCEDURE — 93306 TTE W/DOPPLER COMPLETE: CPT

## 2019-11-19 PROCEDURE — 2580000003 HC RX 258: Performed by: INTERNAL MEDICINE

## 2019-11-19 PROCEDURE — 6370000000 HC RX 637 (ALT 250 FOR IP): Performed by: INTERNAL MEDICINE

## 2019-11-19 PROCEDURE — 36415 COLL VENOUS BLD VENIPUNCTURE: CPT

## 2019-11-19 PROCEDURE — 97165 OT EVAL LOW COMPLEX 30 MIN: CPT

## 2019-11-19 PROCEDURE — 6360000002 HC RX W HCPCS: Performed by: INTERNAL MEDICINE

## 2019-11-19 PROCEDURE — 92610 EVALUATE SWALLOWING FUNCTION: CPT

## 2019-11-19 PROCEDURE — 99223 1ST HOSP IP/OBS HIGH 75: CPT | Performed by: PSYCHIATRY & NEUROLOGY

## 2019-11-19 PROCEDURE — 94760 N-INVAS EAR/PLS OXIMETRY 1: CPT

## 2019-11-19 PROCEDURE — 1200000000 HC SEMI PRIVATE

## 2019-11-19 PROCEDURE — 97161 PT EVAL LOW COMPLEX 20 MIN: CPT

## 2019-11-19 PROCEDURE — 97116 GAIT TRAINING THERAPY: CPT

## 2019-11-19 PROCEDURE — 97535 SELF CARE MNGMENT TRAINING: CPT

## 2019-11-19 PROCEDURE — 80061 LIPID PANEL: CPT

## 2019-11-19 PROCEDURE — 92526 ORAL FUNCTION THERAPY: CPT

## 2019-11-19 RX ORDER — CLOPIDOGREL BISULFATE 75 MG/1
75 TABLET ORAL DAILY
Status: DISCONTINUED | OUTPATIENT
Start: 2019-11-19 | End: 2019-11-20 | Stop reason: HOSPADM

## 2019-11-19 RX ADMIN — Medication 10 ML: at 20:52

## 2019-11-19 RX ADMIN — FOLIC ACID 1000 MCG: 1 TABLET ORAL at 11:09

## 2019-11-19 RX ADMIN — ENOXAPARIN SODIUM 30 MG: 30 INJECTION SUBCUTANEOUS at 20:51

## 2019-11-19 RX ADMIN — PANTOPRAZOLE SODIUM 40 MG: 40 TABLET, DELAYED RELEASE ORAL at 06:32

## 2019-11-19 RX ADMIN — Medication 10 ML: at 11:12

## 2019-11-19 RX ADMIN — ROSUVASTATIN CALCIUM 40 MG: 40 TABLET, FILM COATED ORAL at 20:51

## 2019-11-19 RX ADMIN — FOLIC ACID 1000 MCG: 1 TABLET ORAL at 20:51

## 2019-11-19 RX ADMIN — CLOPIDOGREL 75 MG: 75 TABLET, FILM COATED ORAL at 11:10

## 2019-11-19 RX ADMIN — METOPROLOL TARTRATE 37.5 MG: 25 TABLET, FILM COATED ORAL at 11:09

## 2019-11-19 RX ADMIN — ISOSORBIDE MONONITRATE 30 MG: 30 TABLET, EXTENDED RELEASE ORAL at 11:09

## 2019-11-19 RX ADMIN — LEVOTHYROXINE SODIUM 50 MCG: 25 TABLET ORAL at 06:32

## 2019-11-19 RX ADMIN — METOPROLOL TARTRATE 37.5 MG: 25 TABLET, FILM COATED ORAL at 20:51

## 2019-11-19 ASSESSMENT — PAIN SCALES - GENERAL
PAINLEVEL_OUTOF10: 0
PAINLEVEL_OUTOF10: 0

## 2019-11-20 VITALS
OXYGEN SATURATION: 94 % | WEIGHT: 122.1 LBS | RESPIRATION RATE: 18 BRPM | BODY MASS INDEX: 23.97 KG/M2 | TEMPERATURE: 98.1 F | HEART RATE: 71 BPM | HEIGHT: 60 IN | SYSTOLIC BLOOD PRESSURE: 148 MMHG | DIASTOLIC BLOOD PRESSURE: 78 MMHG

## 2019-11-20 DIAGNOSIS — G45.9 TIA (TRANSIENT ISCHEMIC ATTACK): Primary | ICD-10-CM

## 2019-11-20 PROCEDURE — 99232 SBSQ HOSP IP/OBS MODERATE 35: CPT | Performed by: PSYCHIATRY & NEUROLOGY

## 2019-11-20 PROCEDURE — 6370000000 HC RX 637 (ALT 250 FOR IP): Performed by: INTERNAL MEDICINE

## 2019-11-20 PROCEDURE — 2580000003 HC RX 258: Performed by: INTERNAL MEDICINE

## 2019-11-20 PROCEDURE — 94760 N-INVAS EAR/PLS OXIMETRY 1: CPT

## 2019-11-20 RX ORDER — CLOPIDOGREL BISULFATE 75 MG/1
75 TABLET ORAL DAILY
Qty: 30 TABLET | Refills: 3 | Status: SHIPPED | OUTPATIENT
Start: 2019-11-21 | End: 2020-01-16

## 2019-11-20 RX ORDER — ROSUVASTATIN CALCIUM 20 MG/1
20 TABLET, COATED ORAL NIGHTLY
Qty: 30 TABLET | Refills: 2 | Status: SHIPPED | OUTPATIENT
Start: 2019-11-20 | End: 2020-05-26

## 2019-11-20 RX ADMIN — LEVOTHYROXINE SODIUM 50 MCG: 25 TABLET ORAL at 05:04

## 2019-11-20 RX ADMIN — METOPROLOL TARTRATE 37.5 MG: 25 TABLET, FILM COATED ORAL at 09:15

## 2019-11-20 RX ADMIN — ISOSORBIDE MONONITRATE 30 MG: 30 TABLET, EXTENDED RELEASE ORAL at 09:15

## 2019-11-20 RX ADMIN — PANTOPRAZOLE SODIUM 40 MG: 40 TABLET, DELAYED RELEASE ORAL at 05:04

## 2019-11-20 RX ADMIN — Medication 10 ML: at 09:16

## 2019-11-20 RX ADMIN — CLOPIDOGREL 75 MG: 75 TABLET, FILM COATED ORAL at 09:15

## 2019-11-20 RX ADMIN — FOLIC ACID 1000 MCG: 1 TABLET ORAL at 09:16

## 2019-11-20 ASSESSMENT — PAIN SCALES - GENERAL: PAINLEVEL_OUTOF10: 0

## 2019-11-21 ENCOUNTER — OFFICE VISIT (OUTPATIENT)
Dept: ORTHOPEDIC SURGERY | Age: 84
End: 2019-11-21

## 2019-11-21 ENCOUNTER — TELEPHONE (OUTPATIENT)
Dept: FAMILY MEDICINE CLINIC | Age: 84
End: 2019-11-21

## 2019-11-21 VITALS — BODY MASS INDEX: 23.95 KG/M2 | HEART RATE: 76 BPM | HEIGHT: 60 IN | RESPIRATION RATE: 16 BRPM | WEIGHT: 122 LBS

## 2019-11-21 DIAGNOSIS — S42.201A CLOSED FRACTURE OF PROXIMAL END OF RIGHT HUMERUS, UNSPECIFIED FRACTURE MORPHOLOGY, INITIAL ENCOUNTER: Primary | ICD-10-CM

## 2019-11-21 PROCEDURE — APPNB15 APP NON BILLABLE TIME 0-15 MINS: Performed by: NURSE PRACTITIONER

## 2019-11-21 PROCEDURE — 99024 POSTOP FOLLOW-UP VISIT: CPT | Performed by: NURSE PRACTITIONER

## 2019-12-03 ENCOUNTER — TELEPHONE (OUTPATIENT)
Dept: CARDIOLOGY CLINIC | Age: 84
End: 2019-12-03

## 2019-12-04 ENCOUNTER — TELEPHONE (OUTPATIENT)
Dept: CARDIOLOGY CLINIC | Age: 84
End: 2019-12-04

## 2019-12-06 ENCOUNTER — OFFICE VISIT (OUTPATIENT)
Dept: CARDIOLOGY CLINIC | Age: 84
End: 2019-12-06
Payer: MEDICARE

## 2019-12-06 VITALS
HEART RATE: 70 BPM | BODY MASS INDEX: 23.83 KG/M2 | WEIGHT: 122 LBS | SYSTOLIC BLOOD PRESSURE: 124 MMHG | DIASTOLIC BLOOD PRESSURE: 70 MMHG

## 2019-12-06 DIAGNOSIS — I25.10 CAD IN NATIVE ARTERY: ICD-10-CM

## 2019-12-06 DIAGNOSIS — E78.5 HYPERLIPIDEMIA, UNSPECIFIED HYPERLIPIDEMIA TYPE: ICD-10-CM

## 2019-12-06 DIAGNOSIS — I10 ESSENTIAL HYPERTENSION: Primary | ICD-10-CM

## 2019-12-06 DIAGNOSIS — R06.02 SOB (SHORTNESS OF BREATH): ICD-10-CM

## 2019-12-06 DIAGNOSIS — J84.9 INTERSTITIAL LUNG DISEASE (HCC): ICD-10-CM

## 2019-12-06 PROCEDURE — 4040F PNEUMOC VAC/ADMIN/RCVD: CPT | Performed by: INTERNAL MEDICINE

## 2019-12-06 PROCEDURE — 99214 OFFICE O/P EST MOD 30 MIN: CPT | Performed by: INTERNAL MEDICINE

## 2019-12-06 PROCEDURE — 1036F TOBACCO NON-USER: CPT | Performed by: INTERNAL MEDICINE

## 2019-12-06 PROCEDURE — 1123F ACP DISCUSS/DSCN MKR DOCD: CPT | Performed by: INTERNAL MEDICINE

## 2019-12-06 PROCEDURE — G8420 CALC BMI NORM PARAMETERS: HCPCS | Performed by: INTERNAL MEDICINE

## 2019-12-06 PROCEDURE — 1090F PRES/ABSN URINE INCON ASSESS: CPT | Performed by: INTERNAL MEDICINE

## 2019-12-06 PROCEDURE — G8427 DOCREV CUR MEDS BY ELIG CLIN: HCPCS | Performed by: INTERNAL MEDICINE

## 2019-12-06 PROCEDURE — 1111F DSCHRG MED/CURRENT MED MERGE: CPT | Performed by: INTERNAL MEDICINE

## 2019-12-06 PROCEDURE — G8598 ASA/ANTIPLAT THER USED: HCPCS | Performed by: INTERNAL MEDICINE

## 2019-12-06 PROCEDURE — G8482 FLU IMMUNIZE ORDER/ADMIN: HCPCS | Performed by: INTERNAL MEDICINE

## 2019-12-06 ASSESSMENT — ENCOUNTER SYMPTOMS
SHORTNESS OF BREATH: 1
CHEST TIGHTNESS: 0
CHOKING: 0
COUGH: 0

## 2019-12-10 ENCOUNTER — OFFICE VISIT (OUTPATIENT)
Dept: FAMILY MEDICINE CLINIC | Age: 84
End: 2019-12-10
Payer: MEDICARE

## 2019-12-10 VITALS
WEIGHT: 120.4 LBS | SYSTOLIC BLOOD PRESSURE: 110 MMHG | DIASTOLIC BLOOD PRESSURE: 80 MMHG | HEART RATE: 81 BPM | OXYGEN SATURATION: 95 % | HEIGHT: 60 IN | BODY MASS INDEX: 23.64 KG/M2

## 2019-12-10 DIAGNOSIS — I63.9 CEREBROVASCULAR ACCIDENT (CVA), UNSPECIFIED MECHANISM (HCC): Primary | ICD-10-CM

## 2019-12-10 DIAGNOSIS — E78.5 HYPERLIPIDEMIA, UNSPECIFIED HYPERLIPIDEMIA TYPE: ICD-10-CM

## 2019-12-10 DIAGNOSIS — I65.02 ASYMPTOMATIC STENOSIS OF LEFT VERTEBRAL ARTERY: ICD-10-CM

## 2019-12-10 DIAGNOSIS — R09.02 EXERCISE HYPOXEMIA: ICD-10-CM

## 2019-12-10 DIAGNOSIS — I10 HTN (HYPERTENSION), BENIGN: ICD-10-CM

## 2019-12-10 PROBLEM — G45.9 TIA (TRANSIENT ISCHEMIC ATTACK): Status: RESOLVED | Noted: 2019-11-18 | Resolved: 2019-12-10

## 2019-12-10 PROCEDURE — G8427 DOCREV CUR MEDS BY ELIG CLIN: HCPCS | Performed by: FAMILY MEDICINE

## 2019-12-10 PROCEDURE — G8482 FLU IMMUNIZE ORDER/ADMIN: HCPCS | Performed by: FAMILY MEDICINE

## 2019-12-10 PROCEDURE — G8420 CALC BMI NORM PARAMETERS: HCPCS | Performed by: FAMILY MEDICINE

## 2019-12-10 PROCEDURE — 1123F ACP DISCUSS/DSCN MKR DOCD: CPT | Performed by: FAMILY MEDICINE

## 2019-12-10 PROCEDURE — 1111F DSCHRG MED/CURRENT MED MERGE: CPT | Performed by: FAMILY MEDICINE

## 2019-12-10 PROCEDURE — 99214 OFFICE O/P EST MOD 30 MIN: CPT | Performed by: FAMILY MEDICINE

## 2019-12-10 PROCEDURE — G8598 ASA/ANTIPLAT THER USED: HCPCS | Performed by: FAMILY MEDICINE

## 2019-12-10 PROCEDURE — 1036F TOBACCO NON-USER: CPT | Performed by: FAMILY MEDICINE

## 2019-12-10 PROCEDURE — 1090F PRES/ABSN URINE INCON ASSESS: CPT | Performed by: FAMILY MEDICINE

## 2019-12-10 PROCEDURE — 4040F PNEUMOC VAC/ADMIN/RCVD: CPT | Performed by: FAMILY MEDICINE

## 2019-12-12 PROCEDURE — 93228 REMOTE 30 DAY ECG REV/REPORT: CPT | Performed by: INTERNAL MEDICINE

## 2019-12-24 RX ORDER — ISOSORBIDE MONONITRATE 30 MG/1
TABLET, EXTENDED RELEASE ORAL
Qty: 90 TABLET | Refills: 3 | Status: SHIPPED | OUTPATIENT
Start: 2019-12-24 | End: 2020-12-09

## 2020-01-02 ENCOUNTER — OFFICE VISIT (OUTPATIENT)
Dept: ORTHOPEDIC SURGERY | Age: 85
End: 2020-01-02
Payer: MEDICARE

## 2020-01-02 VITALS — BODY MASS INDEX: 23.56 KG/M2 | HEIGHT: 60 IN | WEIGHT: 120 LBS

## 2020-01-02 PROCEDURE — 1036F TOBACCO NON-USER: CPT | Performed by: NURSE PRACTITIONER

## 2020-01-02 PROCEDURE — 99213 OFFICE O/P EST LOW 20 MIN: CPT | Performed by: NURSE PRACTITIONER

## 2020-01-02 PROCEDURE — 1090F PRES/ABSN URINE INCON ASSESS: CPT | Performed by: NURSE PRACTITIONER

## 2020-01-02 PROCEDURE — G8427 DOCREV CUR MEDS BY ELIG CLIN: HCPCS | Performed by: NURSE PRACTITIONER

## 2020-01-02 PROCEDURE — 1123F ACP DISCUSS/DSCN MKR DOCD: CPT | Performed by: NURSE PRACTITIONER

## 2020-01-02 PROCEDURE — G8420 CALC BMI NORM PARAMETERS: HCPCS | Performed by: NURSE PRACTITIONER

## 2020-01-02 PROCEDURE — 4040F PNEUMOC VAC/ADMIN/RCVD: CPT | Performed by: NURSE PRACTITIONER

## 2020-01-02 PROCEDURE — G8482 FLU IMMUNIZE ORDER/ADMIN: HCPCS | Performed by: NURSE PRACTITIONER

## 2020-01-02 NOTE — PROGRESS NOTES
DIAGNOSIS: Right proximal humerus comminuted displaced fracture, status post ORIF. DATE OF SURGERY: 9/27/2019. HISTORY OF PRESENT ILLNESS:  Ms. Jigar Preciado  80 y.o.  female right handed, who came in today for 3 months postoperative visit. The patient denies any significant pain in the right shoulder. Rates pain a 0/10 VAS and is doing well. She has been working on ROM with PT and just completed that this week. She was given a home exercise program.  She initially had been very hesitant to remove it or to remove the sling. No numbness or tingling sensation. No fever or Chills. She had a recent CVA and has speech deficits and is currently getting PT and speech therapy at home for this.      Past Medical History:   Diagnosis Date    Bilateral cataracts 9/17/2015    Cholelithiasis     Coronary artery disease     Hyperlipidemia     Hypertension     Hypothyroid 4/11/2014    IPF (idiopathic pulmonary fibrosis) ([facility] [address] 6/12/2017    Thyroid nodule     Wears dentures     upper    Wears glasses      Past Surgical History:   Procedure Laterality Date    CHOLECYSTECTOMY, LAPAROSCOPIC  8/14/2012    MULTIPORT ROBOTIC ASSISTED LAPAROSCOPIC CHOLECYSTECTOMY WITH    CORONARY ANGIOPLASTY WITH STENT PLACEMENT  07/2003    HUMERUS FRACTURE SURGERY Right 9/27/2019    OPEN REDUCTION INTERNAL FIXATION RIGHT PROXIMAL HUMERUS WITH C-ARM performed by Terrance Miramontes MD at [id number], PARTIAL  11/1985    parathyroidectomy    TONSILLECTOMY      WISDOM TOOTH EXTRACTION       Family History   Problem Relation Age of Onset    Coronary Art Dis Mother     Stroke Mother     Other Mother         gallstones    Coronary Art Dis Father     Heart Disease Father     Prostate Cancer Brother     High Cholesterol Brother     Other Brother         gallstones    Cancer Brother         prostate    Heart Disease Brother     Other Brother         gallstones    Cancer Brother         prostate     Social History months out from right proximal humerus ORIF and doing very well. PLAN:  I have told the patient to work on ROM, as well as strengthening exercises that were given to her by physical therapy. She can go back to normal activity with no restrictions. She will be seen 3 months PRN. I told the patient that it is not unusual to have some achy pain and swelling for up to a year after a fracture. As this patient has demonstrated risk factors for osteoporosis, such as age and evidence of a fracture, I have referred the patient back to the primary care physician for evaluation for osteoporosis, including consideration for DEXA scanning, if this is felt to be clinically indicated. The patient is advised to contact the primary care physician to follow-up for further evaluation.      Blas Menjivar, APRN - CNP

## 2020-01-14 ENCOUNTER — OFFICE VISIT (OUTPATIENT)
Dept: PULMONOLOGY | Age: 85
End: 2020-01-14
Payer: MEDICARE

## 2020-01-14 VITALS
HEART RATE: 71 BPM | BODY MASS INDEX: 23.56 KG/M2 | RESPIRATION RATE: 16 BRPM | HEIGHT: 60 IN | SYSTOLIC BLOOD PRESSURE: 114 MMHG | DIASTOLIC BLOOD PRESSURE: 68 MMHG | WEIGHT: 120 LBS | OXYGEN SATURATION: 92 %

## 2020-01-14 DIAGNOSIS — E78.5 HYPERLIPIDEMIA, UNSPECIFIED HYPERLIPIDEMIA TYPE: ICD-10-CM

## 2020-01-14 LAB
ALBUMIN SERPL-MCNC: 3.9 G/DL (ref 3.4–5)
ALP BLD-CCNC: 100 U/L (ref 40–129)
ALT SERPL-CCNC: 8 U/L (ref 10–40)
AST SERPL-CCNC: 14 U/L (ref 15–37)
BILIRUB SERPL-MCNC: 0.3 MG/DL (ref 0–1)
BILIRUBIN DIRECT: <0.2 MG/DL (ref 0–0.3)
BILIRUBIN, INDIRECT: ABNORMAL MG/DL (ref 0–1)
CHOLESTEROL, TOTAL: 194 MG/DL (ref 0–199)
HDLC SERPL-MCNC: 79 MG/DL (ref 40–60)
LDL CHOLESTEROL CALCULATED: 96 MG/DL
TOTAL CK: 46 U/L (ref 26–192)
TOTAL PROTEIN: 6.9 G/DL (ref 6.4–8.2)
TRIGL SERPL-MCNC: 97 MG/DL (ref 0–150)
VLDLC SERPL CALC-MCNC: 19 MG/DL

## 2020-01-14 PROCEDURE — G8482 FLU IMMUNIZE ORDER/ADMIN: HCPCS | Performed by: INTERNAL MEDICINE

## 2020-01-14 PROCEDURE — G8427 DOCREV CUR MEDS BY ELIG CLIN: HCPCS | Performed by: INTERNAL MEDICINE

## 2020-01-14 PROCEDURE — 4040F PNEUMOC VAC/ADMIN/RCVD: CPT | Performed by: INTERNAL MEDICINE

## 2020-01-14 PROCEDURE — 1123F ACP DISCUSS/DSCN MKR DOCD: CPT | Performed by: INTERNAL MEDICINE

## 2020-01-14 PROCEDURE — G8420 CALC BMI NORM PARAMETERS: HCPCS | Performed by: INTERNAL MEDICINE

## 2020-01-14 PROCEDURE — 99214 OFFICE O/P EST MOD 30 MIN: CPT | Performed by: INTERNAL MEDICINE

## 2020-01-14 PROCEDURE — 1036F TOBACCO NON-USER: CPT | Performed by: INTERNAL MEDICINE

## 2020-01-14 PROCEDURE — 1090F PRES/ABSN URINE INCON ASSESS: CPT | Performed by: INTERNAL MEDICINE

## 2020-01-14 NOTE — PROGRESS NOTES
[facility]    Outpatient Follow Up Note    Subjective:   CHIEF COMPLAINT / HPI:     The patient is 80 y.o. female who presents today for follow up of IPF. She continues on 05 100 mg a day and seems to be tolerating this decently. No significant diarrhea and weight is unchanged. Since seeing her last she has no worsening of dyspnea on exertion. She does not have any significant cough.     Past Medical History:    Past Medical History:   Diagnosis Date    Bilateral cataracts 2015    Cholelithiasis     Coronary artery disease     Hyperlipidemia     Hypertension     Hypothyroid 2014    IPF [facility]idiopathic pulmonary fibrosis) ([facility] [address] 2017    Thyroid nodule     Wears dentures     upper    Wears glasses        Social History:    Social History     Tobacco Use   Smoking Status Former Smoker    Last attempt to quit: 1985    Years since quittin.4   Smokeless Tobacco Never Used       Current Medications:  Current Outpatient Medications on File Prior to Visit   Medication Sig Dispense Refill    isosorbide mononitrate (IMDUR) 30 MG extended release tablet TAKE ONE TABLET BY MOUTH DAILY 90 tablet 3    rosuvastatin (CRESTOR) 20 MG tablet Take 1 tablet by mouth nightly 30 tablet 2    clopidogrel (PLAVIX) 75 MG tablet Take 1 tablet by mouth daily 30 tablet 3    Nintedanib Esylate 100 MG CAPS Take 100 mg by mouth daily 90 capsule 3    nitroGLYCERIN (NITROSTAT) 0.4 MG SL tablet Place 1 tablet under the tongue every 5 minutes as needed for Chest pain 25 tablet 3    OXYGEN Inhale 2 L into the lungs as needed      folic acid (FOLVITE) 1 MG tablet TAKE ONE TABLET BY MOUTH TWICE A  tablet 1    levothyroxine (SYNTHROID) 50 MCG tablet TAKE ONE TABLET BY MOUTH DAILY 90 tablet 1    irbesartan (AVAPRO) 75 MG tablet Take 1 tablet by mouth daily 90 tablet 3    metoprolol tartrate (LOPRESSOR) 25 MG tablet TAKE ONE AND ONE-HALF TABLET BY MOUTH TWICE A  tablet 1    Handicap Placard MISC by Does not apply route Duration: 5 years 1 each 0    ketoconazole (NIZORAL) 2 % cream Apply topically daily. (Patient taking differently: as needed Apply topically daily.) 30 g 1    omeprazole (PRILOSEC) 20 MG capsule Take 20 mg by mouth daily. No current facility-administered medications on file prior to visit. REVIEW OF SYSTEMS:    CONSTITUTIONAL: Negative for fevers and chills  HEENT: Negative for oropharyngeal exudate, post nasal drip, sinus pain / pressure, nasal congestion, ear pain  RESPIRATORY:  See HPI  CARDIOVASCULAR: Negative for chest pain, palpitations, edema  GASTROINTESTINAL: Negative for nausea, vomiting, diarrhea, constipation and abdominal pain  HEMATOLOGICAL: Negative for adenopathy  SKIN: Negative for clubbing, cyanosis, skin lesions  EXTREMITIES: Negative for weakness, decreased ROM  NEUROLOGICAL: Negative for unilateral weakness, speech or gait abnormalities    Objective:   PHYSICAL EXAM:        VITALS:  /68 (Site: Right Upper Arm, Position: Sitting, Cuff Size: Medium Adult)   Pulse 71   Resp 16   Ht 5' (1.524 m)   Wt 120 lb (54.4 kg)   SpO2 92%   Breastfeeding? No   BMI 23.44 kg/m²   On room air  CONSTITUTIONAL:  Awake, alert, cooperative, no apparent distress, and appears stated age  HEENT: No oropharyngeal exudate, PERRL, no cervical adenopathy, no tracheal deviation, thyroid size normal  LUNGS:  No increased work of breathing. Bilateral basilar crackles  CARDIOVASCULAR:  normal S1 and S2 and no JVD  ABDOMEN:  Normal bowel sounds, non-distended and non-tender to palpation  EXT: No edema, no calf tenderness. Pulses are present bilaterally. NEUROLOGIC:  Mental Status Exam:  Level of Alertness:   awake  Orientation:   person, place, time. SKIN:  normal skin color, texture, turgor, no redness, warmth, or swelling     DATA:      Radiology Review:  Pertinent images / reports were reviewed as a part of this visit.   CT chest dated PROCEDURE:  04/09/2019     INTERPRETATION:  Spirometry reveals decreased FVC at 1.33 liters, which  is 70% predicted. FEV1 is decreased at 1.08 liters, which is 78%  predicted. FEV1/FVC ratio is normal.  There is a no significant change  after inhaled bronchodilators. Lung volumes reveal decreased total lung  capacity at 58% predicted, vital capacity is decreased at 66% predicted  and residual volume is decreased at 51% predicted. Diffusion capacity  is markedly decreased at 33% predicted.     IMPRESSION:  Moderate to severe restrictive lung disease.     Assessment:      Diagnosis Orders   1. [facility]     2. Chronic hypoxemic respiratory failure ([facility]         Plan:     1. Continue Ofev 100 mg dailyt. She is not having any significant side effects. Use immodium prn diarrhea. Continue Prilosec 20 mg daily  2. Will obtain spirometry on next visit. Last prom[place] was April 2019  3. Continue portable oxygen concentrator  4. She has a pulse oximeter and monitors oxygen saturation with exertion. She knows the goal oxygen saturation is 88% or higher  5. She is up to date with her Prevnar 15, influenza and Pneumovax vaccinations  6.   Return to my office in 3 months or sooner if needed

## 2020-01-15 ENCOUNTER — TELEPHONE (OUTPATIENT)
Dept: PULMONOLOGY | Age: 85
End: 2020-01-15

## 2020-01-16 ENCOUNTER — OFFICE VISIT (OUTPATIENT)
Dept: FAMILY MEDICINE CLINIC | Age: 85
End: 2020-01-16
Payer: MEDICARE

## 2020-01-16 ENCOUNTER — CARE COORDINATION (OUTPATIENT)
Dept: CARE COORDINATION | Age: 85
End: 2020-01-16

## 2020-01-16 ENCOUNTER — OFFICE VISIT (OUTPATIENT)
Dept: CARDIOLOGY CLINIC | Age: 85
End: 2020-01-16
Payer: MEDICARE

## 2020-01-16 VITALS
OXYGEN SATURATION: 96 % | HEART RATE: 67 BPM | DIASTOLIC BLOOD PRESSURE: 74 MMHG | SYSTOLIC BLOOD PRESSURE: 120 MMHG | WEIGHT: 121.6 LBS | BODY MASS INDEX: 23.75 KG/M2

## 2020-01-16 VITALS
SYSTOLIC BLOOD PRESSURE: 130 MMHG | WEIGHT: 121 LBS | BODY MASS INDEX: 23.63 KG/M2 | HEART RATE: 68 BPM | DIASTOLIC BLOOD PRESSURE: 80 MMHG

## 2020-01-16 PROBLEM — I48.0 PAROXYSMAL ATRIAL FIBRILLATION (HCC): Status: ACTIVE | Noted: 2020-01-16

## 2020-01-16 PROCEDURE — 99213 OFFICE O/P EST LOW 20 MIN: CPT | Performed by: FAMILY MEDICINE

## 2020-01-16 PROCEDURE — 1036F TOBACCO NON-USER: CPT | Performed by: FAMILY MEDICINE

## 2020-01-16 PROCEDURE — G8482 FLU IMMUNIZE ORDER/ADMIN: HCPCS | Performed by: FAMILY MEDICINE

## 2020-01-16 PROCEDURE — G8482 FLU IMMUNIZE ORDER/ADMIN: HCPCS | Performed by: INTERNAL MEDICINE

## 2020-01-16 PROCEDURE — 1123F ACP DISCUSS/DSCN MKR DOCD: CPT | Performed by: INTERNAL MEDICINE

## 2020-01-16 PROCEDURE — 1090F PRES/ABSN URINE INCON ASSESS: CPT | Performed by: FAMILY MEDICINE

## 2020-01-16 PROCEDURE — 1036F TOBACCO NON-USER: CPT | Performed by: INTERNAL MEDICINE

## 2020-01-16 PROCEDURE — 99214 OFFICE O/P EST MOD 30 MIN: CPT | Performed by: INTERNAL MEDICINE

## 2020-01-16 PROCEDURE — G8427 DOCREV CUR MEDS BY ELIG CLIN: HCPCS | Performed by: FAMILY MEDICINE

## 2020-01-16 PROCEDURE — 4040F PNEUMOC VAC/ADMIN/RCVD: CPT | Performed by: INTERNAL MEDICINE

## 2020-01-16 PROCEDURE — G8427 DOCREV CUR MEDS BY ELIG CLIN: HCPCS | Performed by: INTERNAL MEDICINE

## 2020-01-16 PROCEDURE — 1123F ACP DISCUSS/DSCN MKR DOCD: CPT | Performed by: FAMILY MEDICINE

## 2020-01-16 PROCEDURE — 1090F PRES/ABSN URINE INCON ASSESS: CPT | Performed by: INTERNAL MEDICINE

## 2020-01-16 PROCEDURE — G8420 CALC BMI NORM PARAMETERS: HCPCS | Performed by: INTERNAL MEDICINE

## 2020-01-16 PROCEDURE — G8420 CALC BMI NORM PARAMETERS: HCPCS | Performed by: FAMILY MEDICINE

## 2020-01-16 PROCEDURE — 4040F PNEUMOC VAC/ADMIN/RCVD: CPT | Performed by: FAMILY MEDICINE

## 2020-01-16 ASSESSMENT — ENCOUNTER SYMPTOMS
CHEST TIGHTNESS: 0
VOMITING: 0
CHOKING: 0
NAUSEA: 0
SHORTNESS OF BREATH: 0
COUGH: 0
COUGH: 0
SHORTNESS OF BREATH: 1

## 2020-01-16 NOTE — PROGRESS NOTES
Subjective:     Patient Sun Davis is a 80 y.o. female. Palpitations    This is a new problem. The current episode started more than 1 month ago. The problem occurs intermittently. The problem has been unchanged. Exacerbated by: ILD. Pertinent negatives include no anxiety, chest fullness, chest pain, coughing, diaphoresis, dizziness, fever, irregular heartbeat, malaise/fatigue, nausea, near-syncope, numbness, shortness of breath, syncope, vomiting or weakness. She has tried beta blockers for the symptoms. The treatment provided moderate relief. Risk factors include post menopause. There is no history of anemia, anxiety, drug use, heart disease, hyperthyroidism or a valve disorder. Allergies   Allergen Reactions    Lipitor [Atorvastatin] Hives       Current Outpatient Medications   Medication Sig Dispense Refill    isosorbide mononitrate (IMDUR) 30 MG extended release tablet TAKE ONE TABLET BY MOUTH DAILY 90 tablet 3    rosuvastatin (CRESTOR) 20 MG tablet Take 1 tablet by mouth nightly 30 tablet 2    Nintedanib Esylate 100 MG CAPS Take 100 mg by mouth daily 90 capsule 3    nitroGLYCERIN (NITROSTAT) 0.4 MG SL tablet Place 1 tablet under the tongue every 5 minutes as needed for Chest pain 25 tablet 3    OXYGEN Inhale 2 L into the lungs as needed      folic acid (FOLVITE) 1 MG tablet TAKE ONE TABLET BY MOUTH TWICE A  tablet 1    levothyroxine (SYNTHROID) 50 MCG tablet TAKE ONE TABLET BY MOUTH DAILY 90 tablet 1    irbesartan (AVAPRO) 75 MG tablet Take 1 tablet by mouth daily 90 tablet 3    metoprolol tartrate (LOPRESSOR) 25 MG tablet TAKE ONE AND ONE-HALF TABLET BY MOUTH TWICE A  tablet 1    Handicap Placard MISC by Does not apply route Duration: 5 years 1 each 0    ketoconazole (NIZORAL) 2 % cream Apply topically daily. (Patient taking differently: as needed Apply topically daily.) 30 g 1    omeprazole (PRILOSEC) 20 MG capsule Take 20 mg by mouth daily.        No current of clubs or organizations: Not on file     Relationship status: Not on file    Intimate partner violence:     Fear of current or ex partner: Not on file     Emotionally abused: Not on file     Physically abused: Not on file     Forced sexual activity: Not on file   Other Topics Concern    Not on file   Social History Narrative    Lives with room mate    Retired    Travels and golf       Family History   Problem Relation Age of Onset    Coronary Art Dis Mother     Stroke Mother     Other Mother         gallstones    Coronary Art Dis Father     Heart Disease Father     Prostate Cancer Brother     High Cholesterol Brother     Other Brother         gallstones    Cancer Brother         prostate    Heart Disease Brother     Other Brother         gallstones    Cancer Brother         prostate       Immunization History   Administered Date(s) Administered    Influenza A ([id number]) Vaccine IM 12/22/2009    Influenza Vaccine, unspecified formulation 10/24/2016    Influenza, High Dose (Fluzone 65 yrs and older) 10/02/2017, 10/31/2018    Influenza, Triv, inactivated, subunit, adjuvanted, IM (Fluad 65 yrs and older) 10/24/2019    Pneumococcal Conjugate 13-valent ([id number]) 01/06/2015    Pneumococcal Polysaccharide ([id number]) 07/07/2004    Zoster Recombinant (Shingrix) 06/14/2018, 02/05/2019       Review of Systems  Review of Systems   Constitutional: Negative for diaphoresis, fever and malaise/fatigue. Respiratory: Negative for cough and shortness of breath. Cardiovascular: Positive for palpitations. Negative for chest pain, syncope and near-syncope. Gastrointestinal: Negative for nausea and vomiting. Neurological: Negative for dizziness, weakness and numbness. Psychiatric/Behavioral: The patient is not nervous/anxious. Objective:   Physical Exam  Physical Exam  Vitals signs reviewed. Constitutional:       General: She is not in acute distress. Appearance: She is well-developed. Eyes:      Conjunctiva/sclera: Conjunctivae normal.      Pupils: Pupils are equal, round, and reactive to light. Neck:      Thyroid: No thyromegaly. Vascular: No JVD. Trachea: No tracheal deviation. Cardiovascular:      Rate and Rhythm: Normal rate and regular rhythm. Heart sounds: Normal heart sounds. No murmur. No gallop. Pulmonary:      Effort: Pulmonary effort is normal. No respiratory distress. Breath sounds: Normal breath sounds. No stridor. No wheezing or rales. Chest:      Chest wall: No tenderness. Abdominal:      General: Bowel sounds are normal. There is no distension. Palpations: Abdomen is soft. There is no mass. Tenderness: There is no tenderness. Musculoskeletal:         General: No tenderness. Lymphadenopathy:      Cervical: No cervical adenopathy. Skin:     General: Skin is warm and dry. Coloration: Skin is not pale. Findings: No erythema or rash. Neurological:      Mental Status: She is alert and oriented to person, place, and time. Cranial Nerves: No cranial nerve deficit. Motor: No abnormal muscle tone. Coordination: Coordination normal.      Deep Tendon Reflexes: Reflexes normal.   Psychiatric:         Behavior: Behavior normal.         Thought Content:  Thought content normal.         Judgment: Judgment normal.         Assessment and Plan:     Paroxysmal atrial fibrillation (HCC)  Now on eliquis--likely cause of CVA    Stroke, lacunar ([address]  Due to above

## 2020-01-16 NOTE — PROGRESS NOTES
Transportation needs:     Medical: Not on file     Non-medical: Not on file   Tobacco Use    Smoking status: Former Smoker     Last attempt to quit: 1985     Years since quittin.4    Smokeless tobacco: Never Used   Substance and Sexual Activity    Alcohol use: Not Currently    Drug use: No    Sexual activity: Not Currently   Lifestyle    Physical activity:     Days per week: Not on file     Minutes per session: Not on file    Stress: Not on file   Relationships    Social connections:     Talks on phone: Not on file     Gets together: Not on file     Attends Druze service: Not on file     Active member of club or organization: Not on file     Attends meetings of clubs or organizations: Not on file     Relationship status: Not on file    Intimate partner violence:     Fear of current or ex partner: Not on file     Emotionally abused: Not on file     Physically abused: Not on file     Forced sexual activity: Not on file   Other Topics Concern    Not on file   Social History Narrative    Lives with room mate    Retired    Travels and golf      [place] reviewed. Denies FH cardiac issues. Vitals:    20 0917   BP: 130/80   Pulse: 68     Wt 126    Review of Systems   Constitutional: Negative for activity change, appetite change and fatigue. Respiratory: Positive for shortness of breath. Negative for cough, choking and chest tightness. Cardiovascular: Negative for chest pain, palpitations and leg swelling. Denies PND or orthopnea. No tachycardia or syncope. Neurological: Negative for dizziness, syncope and light-headedness. Psychiatric/Behavioral: Negative for agitation, behavioral problems and confusion. All other systems reviewed and are negative. Objective:   Physical Exam   Constitutional: She is oriented to person, place, and time. She appears well-developed and well-nourished. No distress. HENT:   Head: Normocephalic and atraumatic.    Eyes: Conjunctivae and EOM

## 2020-02-21 RX ORDER — FOLIC ACID 1 MG/1
TABLET ORAL
Qty: 180 TABLET | Refills: 0 | Status: SHIPPED | OUTPATIENT
Start: 2020-02-21 | End: 2020-05-21

## 2020-02-21 RX ORDER — LEVOTHYROXINE SODIUM 0.05 MG/1
TABLET ORAL
Qty: 90 TABLET | Refills: 0 | Status: SHIPPED | OUTPATIENT
Start: 2020-02-21 | End: 2020-05-21

## 2020-02-21 NOTE — TELEPHONE ENCOUNTER
[facility] Medication Refills:    Requested Prescriptions     Pending Prescriptions Disp Refills    metoprolol tartrate (LOPRESSOR) 25 MG tablet [Pharmacy Med Name: METOPROLOL TARTRATE 25 MG TAB] 90 tablet 3     Sig: TAKE ONE AND ONE-HALF TABLET BY MOUTH TWICE A DAY                     Last Office Visit: 01/16/20  Next Office Visit: 03/03/20

## 2020-03-03 ENCOUNTER — OFFICE VISIT (OUTPATIENT)
Dept: CARDIOLOGY CLINIC | Age: 85
End: 2020-03-03
Payer: MEDICARE

## 2020-03-03 VITALS
DIASTOLIC BLOOD PRESSURE: 70 MMHG | SYSTOLIC BLOOD PRESSURE: 110 MMHG | WEIGHT: 120 LBS | BODY MASS INDEX: 23.44 KG/M2 | HEART RATE: 68 BPM

## 2020-03-03 PROCEDURE — 1036F TOBACCO NON-USER: CPT | Performed by: INTERNAL MEDICINE

## 2020-03-03 PROCEDURE — G8427 DOCREV CUR MEDS BY ELIG CLIN: HCPCS | Performed by: INTERNAL MEDICINE

## 2020-03-03 PROCEDURE — 1090F PRES/ABSN URINE INCON ASSESS: CPT | Performed by: INTERNAL MEDICINE

## 2020-03-03 PROCEDURE — 1123F ACP DISCUSS/DSCN MKR DOCD: CPT | Performed by: INTERNAL MEDICINE

## 2020-03-03 PROCEDURE — 99214 OFFICE O/P EST MOD 30 MIN: CPT | Performed by: INTERNAL MEDICINE

## 2020-03-03 PROCEDURE — G8420 CALC BMI NORM PARAMETERS: HCPCS | Performed by: INTERNAL MEDICINE

## 2020-03-03 PROCEDURE — 4040F PNEUMOC VAC/ADMIN/RCVD: CPT | Performed by: INTERNAL MEDICINE

## 2020-03-03 PROCEDURE — G8482 FLU IMMUNIZE ORDER/ADMIN: HCPCS | Performed by: INTERNAL MEDICINE

## 2020-03-03 ASSESSMENT — ENCOUNTER SYMPTOMS
CHEST TIGHTNESS: 0
COUGH: 0
SHORTNESS OF BREATH: 1
CHOKING: 0

## 2020-03-03 NOTE — PROGRESS NOTES
Worry: Not on file     Inability: Not on file    Transportation needs:     Medical: Not on file     Non-medical: Not on file   Tobacco Use    Smoking status: Former Smoker     Last attempt to quit: 1985     Years since quittin.5    Smokeless tobacco: Never Used   Substance and Sexual Activity    Alcohol use: Not Currently    Drug use: No    Sexual activity: Not Currently   Lifestyle    Physical activity:     Days per week: Not on file     Minutes per session: Not on file    Stress: Not on file   Relationships    Social connections:     Talks on phone: Not on file     Gets together: Not on file     Attends Jehovah's witness service: Not on file     Active member of club or organization: Not on file     Attends meetings of clubs or organizations: Not on file     Relationship status: Not on file    Intimate partner violence:     Fear of current or ex partner: Not on file     Emotionally abused: Not on file     Physically abused: Not on file     Forced sexual activity: Not on file   Other Topics Concern    Not on file   Social History Narrative    Lives with room mate    Retired    Travels and golf      [place] reviewed. Denies FH cardiac issues. Vitals:    20 0856   BP: 110/70   Pulse: 68     Wt 120    Review of Systems   Constitutional: Negative for activity change, appetite change and fatigue. Respiratory: Positive for shortness of breath. Negative for cough, choking and chest tightness. Cardiovascular: Negative for chest pain, palpitations and leg swelling. Denies PND or orthopnea. No tachycardia or syncope. Neurological: Negative for dizziness, syncope and light-headedness. Psychiatric/Behavioral: Negative for agitation, behavioral problems and confusion. All other systems reviewed and are negative. Objective:   Physical Exam   Constitutional: She is oriented to person, place, and time. She appears well-developed and well-nourished. No distress.    HENT:   Head:

## 2020-05-21 RX ORDER — LEVOTHYROXINE SODIUM 0.05 MG/1
TABLET ORAL
Qty: 90 TABLET | Refills: 0 | Status: SHIPPED | OUTPATIENT
Start: 2020-05-21 | End: 2020-08-21 | Stop reason: SDUPTHER

## 2020-05-21 RX ORDER — FOLIC ACID 1 MG/1
TABLET ORAL
Qty: 180 TABLET | Refills: 0 | Status: SHIPPED | OUTPATIENT
Start: 2020-05-21 | End: 2020-08-21 | Stop reason: SDUPTHER

## 2020-05-26 RX ORDER — ROSUVASTATIN CALCIUM 20 MG/1
TABLET, COATED ORAL
Qty: 90 TABLET | Refills: 1 | Status: SHIPPED | OUTPATIENT
Start: 2020-05-26 | End: 2021-01-24 | Stop reason: SDUPTHER

## 2020-06-03 ENCOUNTER — TELEPHONE (OUTPATIENT)
Dept: FAMILY MEDICINE CLINIC | Age: 85
End: 2020-06-03

## 2020-06-05 DIAGNOSIS — I10 HTN (HYPERTENSION), BENIGN: ICD-10-CM

## 2020-06-05 DIAGNOSIS — E03.9 HYPOTHYROIDISM, UNSPECIFIED TYPE: Chronic | ICD-10-CM

## 2020-06-05 DIAGNOSIS — E78.5 HYPERLIPIDEMIA, UNSPECIFIED HYPERLIPIDEMIA TYPE: ICD-10-CM

## 2020-06-05 LAB
A/G RATIO: 1.5 (ref 1.1–2.2)
ALBUMIN SERPL-MCNC: 4 G/DL (ref 3.4–5)
ALP BLD-CCNC: 94 U/L (ref 40–129)
ALT SERPL-CCNC: 8 U/L (ref 10–40)
ANION GAP SERPL CALCULATED.3IONS-SCNC: 14 MMOL/L (ref 3–16)
AST SERPL-CCNC: 16 U/L (ref 15–37)
BASOPHILS ABSOLUTE: 0.1 K/UL (ref 0–0.2)
BASOPHILS RELATIVE PERCENT: 0.9 %
BILIRUB SERPL-MCNC: 0.3 MG/DL (ref 0–1)
BUN BLDV-MCNC: 31 MG/DL (ref 7–20)
CALCIUM SERPL-MCNC: 9.3 MG/DL (ref 8.3–10.6)
CHLORIDE BLD-SCNC: 102 MMOL/L (ref 99–110)
CHOLESTEROL, TOTAL: 181 MG/DL (ref 0–199)
CO2: 22 MMOL/L (ref 21–32)
CREAT SERPL-MCNC: 1.7 MG/DL (ref 0.6–1.2)
EOSINOPHILS ABSOLUTE: 0.4 K/UL (ref 0–0.6)
EOSINOPHILS RELATIVE PERCENT: 3.9 %
GFR AFRICAN AMERICAN: 34
GFR NON-AFRICAN AMERICAN: 28
GLOBULIN: 2.6 G/DL
GLUCOSE BLD-MCNC: 100 MG/DL (ref 70–99)
HCT VFR BLD CALC: 41.3 % (ref 36–48)
HDLC SERPL-MCNC: 66 MG/DL (ref 40–60)
HEMOGLOBIN: 13.6 G/DL (ref 12–16)
LDL CHOLESTEROL CALCULATED: 92 MG/DL
LYMPHOCYTES ABSOLUTE: 1.2 K/UL (ref 1–5.1)
LYMPHOCYTES RELATIVE PERCENT: 12.7 %
MCH RBC QN AUTO: 31.4 PG (ref 26–34)
MCHC RBC AUTO-ENTMCNC: 33 G/DL (ref 31–36)
MCV RBC AUTO: 95.2 FL (ref 80–100)
MONOCYTES ABSOLUTE: 0.5 K/UL (ref 0–1.3)
MONOCYTES RELATIVE PERCENT: 5.7 %
NEUTROPHILS ABSOLUTE: 7.1 K/UL (ref 1.7–7.7)
NEUTROPHILS RELATIVE PERCENT: 76.8 %
PDW BLD-RTO: 15 % (ref 12.4–15.4)
PLATELET # BLD: 302 K/UL (ref 135–450)
PMV BLD AUTO: 8.7 FL (ref 5–10.5)
POTASSIUM SERPL-SCNC: 4.5 MMOL/L (ref 3.5–5.1)
RBC # BLD: 4.33 M/UL (ref 4–5.2)
SODIUM BLD-SCNC: 138 MMOL/L (ref 136–145)
TOTAL PROTEIN: 6.6 G/DL (ref 6.4–8.2)
TRIGL SERPL-MCNC: 116 MG/DL (ref 0–150)
TSH SERPL DL<=0.05 MIU/L-ACNC: 3.04 UIU/ML (ref 0.27–4.2)
VLDLC SERPL CALC-MCNC: 23 MG/DL
WBC # BLD: 9.2 K/UL (ref 4–11)

## 2020-06-11 ENCOUNTER — OFFICE VISIT (OUTPATIENT)
Dept: FAMILY MEDICINE CLINIC | Age: 85
End: 2020-06-11
Payer: MEDICARE

## 2020-06-11 VITALS
DIASTOLIC BLOOD PRESSURE: 52 MMHG | OXYGEN SATURATION: 97 % | WEIGHT: 110 LBS | HEART RATE: 71 BPM | SYSTOLIC BLOOD PRESSURE: 90 MMHG | BODY MASS INDEX: 21.48 KG/M2 | TEMPERATURE: 97.3 F

## 2020-06-11 PROBLEM — K90.9 DIARRHEA DUE TO MALABSORPTION: Status: ACTIVE | Noted: 2020-06-11

## 2020-06-11 PROBLEM — R19.7 DIARRHEA DUE TO MALABSORPTION: Status: ACTIVE | Noted: 2020-06-11

## 2020-06-11 PROCEDURE — 4040F PNEUMOC VAC/ADMIN/RCVD: CPT | Performed by: FAMILY MEDICINE

## 2020-06-11 PROCEDURE — 1123F ACP DISCUSS/DSCN MKR DOCD: CPT | Performed by: FAMILY MEDICINE

## 2020-06-11 PROCEDURE — 99214 OFFICE O/P EST MOD 30 MIN: CPT | Performed by: FAMILY MEDICINE

## 2020-06-11 PROCEDURE — G8420 CALC BMI NORM PARAMETERS: HCPCS | Performed by: FAMILY MEDICINE

## 2020-06-11 PROCEDURE — 1090F PRES/ABSN URINE INCON ASSESS: CPT | Performed by: FAMILY MEDICINE

## 2020-06-11 PROCEDURE — G8427 DOCREV CUR MEDS BY ELIG CLIN: HCPCS | Performed by: FAMILY MEDICINE

## 2020-06-11 PROCEDURE — 1036F TOBACCO NON-USER: CPT | Performed by: FAMILY MEDICINE

## 2020-06-11 ASSESSMENT — PATIENT HEALTH QUESTIONNAIRE - PHQ9
2. FEELING DOWN, DEPRESSED OR HOPELESS: 0
SUM OF ALL RESPONSES TO PHQ QUESTIONS 1-9: 0
1. LITTLE INTEREST OR PLEASURE IN DOING THINGS: 0
SUM OF ALL RESPONSES TO PHQ QUESTIONS 1-9: 0
SUM OF ALL RESPONSES TO PHQ9 QUESTIONS 1 & 2: 0

## 2020-06-11 ASSESSMENT — ENCOUNTER SYMPTOMS
ORTHOPNEA: 0
SHORTNESS OF BREATH: 0
BLURRED VISION: 0

## 2020-06-11 NOTE — PROGRESS NOTES
tablet Place 1 tablet under the tongue every 5 minutes as needed for Chest pain 25 tablet 3    OXYGEN Inhale 2 L into the lungs as needed      irbesartan (AVAPRO) 75 MG tablet Take 1 tablet by mouth daily 90 tablet 3    Handicap Placard MISC by Does not apply route Duration: 5 years 1 each 0    ketoconazole (NIZORAL) 2 % cream Apply topically daily. (Patient taking differently: as needed Apply topically daily.) 30 g 1    omeprazole (PRILOSEC) 20 MG capsule Take 20 mg by mouth daily. No current facility-administered medications for this visit.         Past Medical History:   Diagnosis Date    Bilateral cataracts 9/17/2015    Cholelithiasis     Coronary artery disease     Hyperlipidemia     Hypertension     Hypothyroid 4/11/2014    IPF (idiopathic pulmonary fibrosis) ([address] 6/12/2017    Thyroid nodule     Wears dentures     upper    Wears glasses        Past Surgical History:   Procedure Laterality Date    CHOLECYSTECTOMY, LAPAROSCOPIC  8/14/2012    MULTIPORT ROBOTIC ASSISTED LAPAROSCOPIC CHOLECYSTECTOMY WITH    CORONARY ANGIOPLASTY WITH STENT PLACEMENT  07/2003    HUMERUS FRACTURE SURGERY Right 9/27/2019    OPEN REDUCTION INTERNAL FIXATION RIGHT PROXIMAL HUMERUS WITH C-ARM performed by Barbie Donis MD at [id number] [facility], PARTIAL  11/1985    parathyroidectomy    TONSILLECTOMY      WISDOM TOOTH EXTRACTION         Social History     Socioeconomic History    Marital status: Single     Spouse name: Not on file    Number of children: 0    Years of education: Not on file    Highest education level: Not on file   Occupational History    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[employer]   Social Needs    Financial resource strain: Not on file    Food insecurity     Worry: Not on file     Inability: Not on file    Transportation needs     Medical: Not on file     Non-medical: Not on file   Tobacco Use    Smoking status: Former Smoker     Last attempt to quit: 8/13/1985     Years since quittin.8    Smokeless tobacco: Never Used   Substance and Sexual Activity    Alcohol use: Not Currently    Drug use: No    Sexual activity: Not Currently   Lifestyle    Physical activity     Days per week: Not on file     Minutes per session: Not on file    Stress: Not on file   Relationships    Social connections     Talks on phone: Not on file     Gets together: Not on file     Attends Episcopal service: Not on file     Active member of club or organization: Not on file     Attends meetings of clubs or organizations: Not on file     Relationship status: Not on file    Intimate partner violence     Fear of current or ex partner: Not on file     Emotionally abused: Not on file     Physically abused: Not on file     Forced sexual activity: Not on file   Other Topics Concern    Not on file   Social History Narrative    Lives with room mate    Retired    Travels and golf       Family History   Problem Relation Age of Onset    Coronary Art Dis Mother     Stroke Mother     Other Mother         gallstones    Coronary Art Dis Father     Heart Disease Father     Prostate Cancer Brother     High Cholesterol Brother     Other Brother         gallstones    Cancer Brother         prostate    Heart Disease Brother     Other Brother         gallstones    Cancer Brother         prostate       Immunization History   Administered Date(s) Administered    Influenza A ([id number]) Vaccine IM 2009    Influenza Vaccine, unspecified formulation 10/24/2016    Influenza, High Dose (Fluzone 65 yrs and older) 10/02/2017, 10/31/2018    Influenza, Triv, inactivated, subunit, adjuvanted, IM (Fluad 65 yrs and older) 10/24/2019    Pneumococcal Conjugate 13-valent ([id number]) 2015    Pneumococcal Polysaccharide ([id number]) 2004    Tdap (Boostrix, Adacel) 2018    Zoster Recombinant (Shingrix) 2018, 2019       Review of Systems  Review of Systems   Constitutional: Negative for

## 2020-06-19 NOTE — TELEPHONE ENCOUNTER
Requested Prescriptions     Pending Prescriptions Disp Refills    metoprolol tartrate (LOPRESSOR) 25 MG tablet [Pharmacy Med Name: METOPROLOL TARTRATE 25 MG TAB] 270 tablet 3     Sig: TAKE ONE AND ONE-HALF TABLET BY MOUTH TWICE A DAY                  Last Office Visit: 3/3/2020     Next Office Visit: 7/23/2020

## 2020-06-26 DIAGNOSIS — I10 ESSENTIAL HYPERTENSION: ICD-10-CM

## 2020-06-26 DIAGNOSIS — N18.30 STAGE 3 CHRONIC KIDNEY DISEASE (HCC): ICD-10-CM

## 2020-06-26 LAB
ANION GAP SERPL CALCULATED.3IONS-SCNC: 11 MMOL/L (ref 3–16)
BUN BLDV-MCNC: 19 MG/DL (ref 7–20)
CALCIUM SERPL-MCNC: 9.7 MG/DL (ref 8.3–10.6)
CHLORIDE BLD-SCNC: 106 MMOL/L (ref 99–110)
CO2: 24 MMOL/L (ref 21–32)
CREAT SERPL-MCNC: 1.4 MG/DL (ref 0.6–1.2)
GFR AFRICAN AMERICAN: 43
GFR NON-AFRICAN AMERICAN: 36
GLUCOSE BLD-MCNC: 92 MG/DL (ref 70–99)
POTASSIUM SERPL-SCNC: 4.9 MMOL/L (ref 3.5–5.1)
SODIUM BLD-SCNC: 141 MMOL/L (ref 136–145)

## 2020-07-04 RX ORDER — IRBESARTAN 75 MG/1
TABLET ORAL
Qty: 90 TABLET | Refills: 2 | Status: SHIPPED | OUTPATIENT
Start: 2020-07-04 | End: 2020-07-04 | Stop reason: ALTCHOICE

## 2020-07-23 ENCOUNTER — OFFICE VISIT (OUTPATIENT)
Dept: CARDIOLOGY CLINIC | Age: 85
End: 2020-07-23
Payer: MEDICARE

## 2020-07-23 VITALS
OXYGEN SATURATION: 92 % | WEIGHT: 110.8 LBS | BODY MASS INDEX: 21.64 KG/M2 | SYSTOLIC BLOOD PRESSURE: 130 MMHG | TEMPERATURE: 97.8 F | HEART RATE: 72 BPM | DIASTOLIC BLOOD PRESSURE: 80 MMHG

## 2020-07-23 PROCEDURE — G8420 CALC BMI NORM PARAMETERS: HCPCS | Performed by: INTERNAL MEDICINE

## 2020-07-23 PROCEDURE — 4040F PNEUMOC VAC/ADMIN/RCVD: CPT | Performed by: INTERNAL MEDICINE

## 2020-07-23 PROCEDURE — G8427 DOCREV CUR MEDS BY ELIG CLIN: HCPCS | Performed by: INTERNAL MEDICINE

## 2020-07-23 PROCEDURE — 1036F TOBACCO NON-USER: CPT | Performed by: INTERNAL MEDICINE

## 2020-07-23 PROCEDURE — 99214 OFFICE O/P EST MOD 30 MIN: CPT | Performed by: INTERNAL MEDICINE

## 2020-07-23 PROCEDURE — 1123F ACP DISCUSS/DSCN MKR DOCD: CPT | Performed by: INTERNAL MEDICINE

## 2020-07-23 PROCEDURE — 1090F PRES/ABSN URINE INCON ASSESS: CPT | Performed by: INTERNAL MEDICINE

## 2020-07-23 ASSESSMENT — ENCOUNTER SYMPTOMS
CHOKING: 0
COUGH: 0
CHEST TIGHTNESS: 0
SHORTNESS OF BREATH: 1

## 2020-07-23 NOTE — PROGRESS NOTES
Subjective:      Patient ID: Kiki Jane is a 80 y.o. female. Hypertension   Associated symptoms include shortness of breath. Pertinent negatives include no chest pain or palpitations. Coronary Artery Disease   Symptoms include shortness of breath. Pertinent negatives include no chest pain, chest tightness, dizziness, leg swelling or palpitations. Shortness of Breath   Pertinent negatives include no chest pain or leg swelling. Her past medical history is significant for CAD. Former pt of Dr Guero Higgins. F/U HTN/CAD/sob/interstitial lung disease/hyperlipdemia. Breathing somewhat worse with humidity. She denies chest pain. BP good at home. No pnd or orthopnea  No palp/tachycardia/syncope.  Follows with Dr Viviane Mcallister    Past Medical History:   Diagnosis Date    Bilateral cataracts 9/17/2015    Cholelithiasis     Coronary artery disease     Hyperlipidemia     Hypertension     Hypothyroid 4/11/2014    [facility]F (idiopathic pulmonary fibrosis) [facility] [address] 6/12/2017    Thyroid nodule     Wears dentures     upper    Wears glasses      Past Surgical History:   Procedure Laterality Date    CHOLECYSTECTOMY, LAPAROSCOPIC  8/14/2012    MULTIPORT ROBOTIC ASSISTED LAPAROSCOPIC CHOLECYSTECTOMY WITH    CORONARY ANGIOPLASTY WITH STENT PLACEMENT  07/2003    HUMERUS FRACTURE SURGERY Right 9/27/2019    OPEN REDUCTION INTERNAL FIXATION RIGHT PROXIMAL HUMERUS WITH C-ARM performed by Amara Anderson MD at [address] [facility], PARTIAL  11/1985    parathyroidectomy    TONSILLECTOMY      WISDOM TOOTH EXTRACTION       Social History     Socioeconomic History    Marital status: Single     Spouse name: Not on file    Number of children: 0    Years of education: Not on file    Highest education level: Not on file   Occupational History    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[employer]   Social Needs    Financial resource strain: Not on file    Food insecurity     Worry: Not on file     Inability: Not on file   Outerstuff needs Medical: Not on file     Non-medical: Not on file   Tobacco Use    Smoking status: Former Smoker     Last attempt to quit: 1985     Years since quittin.9    Smokeless tobacco: Never Used   Substance and Sexual Activity    Alcohol use: Not Currently    Drug use: No    Sexual activity: Not Currently   Lifestyle    Physical activity     Days per week: Not on file     Minutes per session: Not on file    Stress: Not on file   Relationships    Social connections     Talks on phone: Not on file     Gets together: Not on file     Attends Christian service: Not on file     Active member of club or organization: Not on file     Attends meetings of clubs or organizations: Not on file     Relationship status: Not on file    Intimate partner violence     Fear of current or ex partner: Not on file     Emotionally abused: Not on file     Physically abused: Not on file     Forced sexual activity: Not on file   Other Topics Concern    Not on file   Social History Narrative    Lives with room mate    Retired    Travels and golf      [place] reviewed. Denies FH cardiac issues. Vitals:    20 1017   BP: 130/80   Pulse: 72   Temp: 97.8 °F (36.6 °C)   SpO2: 92%     Wt 110    Review of Systems   Constitutional: Negative for activity change, appetite change and fatigue. Respiratory: Positive for shortness of breath. Negative for cough, choking and chest tightness. Cardiovascular: Negative for chest pain, palpitations and leg swelling. Denies PND or orthopnea. No tachycardia or syncope. Neurological: Negative for dizziness, syncope and light-headedness. Psychiatric/Behavioral: Negative for agitation, behavioral problems and confusion. All other systems reviewed and are negative. Objective:   Physical Exam   Constitutional: She is oriented to person, place, and time. She appears well-developed and well-nourished. No distress. HENT:   Head: Normocephalic and atraumatic.    Eyes: Conjunctivae and EOM are normal. Right eye exhibits no discharge. Left eye exhibits no discharge. Neck: Normal range of motion. No JVD present. Cardiovascular: Normal rate, regular rhythm, S1 normal, S2 normal and normal heart sounds. Exam reveals no gallop. No murmur heard. Pulses:       Radial pulses are 2+ on the right side and 2+ on the left side. Pulmonary/Chest: Effort normal. No respiratory distress. She has decreased breath sounds. She has no wheezes. She has no rales. Dry crackles. Abdominal: Soft. Bowel sounds are normal. She exhibits no distension. There is no abdominal tenderness. Musculoskeletal: Normal range of motion. General: No edema. Neurological: She is alert and oriented to person, place, and time. Skin: Skin is warm and dry. Psychiatric: She has a normal mood and affect. Her behavior is normal. Thought content normal.         Assessment:       Diagnosis Orders   1. Essential hypertension     2. CAD in native artery     3. Interstitial lung disease ([address]     4. SOB (shortness of breath)     5. Hyperlipidemia, unspecified hyperlipidemia type             Plan:      CV  stable. Neuro unchanged. Sob with some activities but likely due to humidity. . Pulmonary stable. BP good. No chest pain. Lipids per PCP. 30 day monitor showing pafib. Eliquis 2.5 mg bid. No bleeding issues. Reviewed previous records and testing. No changes. Continue to monitor. F/U 3 months. Gina Kimball

## 2020-08-05 ENCOUNTER — OFFICE VISIT (OUTPATIENT)
Dept: PULMONOLOGY | Age: 85
End: 2020-08-05
Payer: MEDICARE

## 2020-08-05 VITALS
BODY MASS INDEX: 21.2 KG/M2 | TEMPERATURE: 96.9 F | HEART RATE: 55 BPM | HEIGHT: 60 IN | RESPIRATION RATE: 18 BRPM | WEIGHT: 108 LBS | OXYGEN SATURATION: 90 %

## 2020-08-05 PROCEDURE — G8420 CALC BMI NORM PARAMETERS: HCPCS | Performed by: INTERNAL MEDICINE

## 2020-08-05 PROCEDURE — G8427 DOCREV CUR MEDS BY ELIG CLIN: HCPCS | Performed by: INTERNAL MEDICINE

## 2020-08-05 PROCEDURE — 1036F TOBACCO NON-USER: CPT | Performed by: INTERNAL MEDICINE

## 2020-08-05 PROCEDURE — 1090F PRES/ABSN URINE INCON ASSESS: CPT | Performed by: INTERNAL MEDICINE

## 2020-08-05 PROCEDURE — 1123F ACP DISCUSS/DSCN MKR DOCD: CPT | Performed by: INTERNAL MEDICINE

## 2020-08-05 PROCEDURE — 4040F PNEUMOC VAC/ADMIN/RCVD: CPT | Performed by: INTERNAL MEDICINE

## 2020-08-05 PROCEDURE — 99214 OFFICE O/P EST MOD 30 MIN: CPT | Performed by: INTERNAL MEDICINE

## 2020-08-05 NOTE — PROGRESS NOTES
[facility] Pulmonary and Critical Care    Outpatient Follow Up Note    Subjective:   CHIEF COMPLAINT / HPI:     The patient is 80 y.o. female who presents today for follow up of IPF. She continues Ofev 100 mg a day and oxygen at 3 L flow. Since last visit she has had worsened dyspnea on exertion, worsening cough with clear phlegm, worsening anorexia, and continued weight loss. Her current weight is 108 down from 120s approximately a year ago. She does not seem to be having a significant amount of diarrhea. She has had anorexia and nausea from Ofev in past requiring temporary discontinuation and resumption at lower dose. She has no fevers, chills, Purulent sputum, hemoptysis, wheezing, or chest pain.     Past Medical History:    Past Medical History:   Diagnosis Date    Bilateral cataracts 2015    Cholelithiasis     Coronary artery disease     Hyperlipidemia     Hypertension     Hypothyroid 2014    IPF (idiopathic pulmonary fibrosis) ([facility] [address] 2017    Thyroid nodule     Wears dentures     upper    Wears glasses        Social History:    Social History     Tobacco Use   Smoking Status Former Smoker    Last attempt to quit: 1985    Years since quittin.0   Smokeless Tobacco Never Used       Current Medications:  Current Outpatient Medications on File Prior to Visit   Medication Sig Dispense Refill    metoprolol tartrate (LOPRESSOR) 25 MG tablet TAKE ONE AND ONE-HALF TABLET BY MOUTH TWICE A  tablet 3    rosuvastatin (CRESTOR) 20 MG tablet TAKE ONE TABLET BY MOUTH ONCE NIGHTLY 90 tablet 1    levothyroxine (SYNTHROID) 50 MCG tablet TAKE ONE TABLET BY MOUTH DAILY 90 tablet 0    folic acid (FOLVITE) 1 MG tablet TAKE ONE TABLET BY MOUTH TWICE A  tablet 0    apixaban (ELIQUIS) 2.5 MG TABS tablet Take 1 tablet by mouth 2 times daily 180 tablet 3    isosorbide mononitrate (IMDUR) 30 MG extended release tablet TAKE ONE TABLET BY MOUTH DAILY 90 tablet 3    Nintedanib Esylate 100 MG CAPS Take 100 mg by mouth daily 90 capsule 3    nitroGLYCERIN (NITROSTAT) 0.4 MG SL tablet Place 1 tablet under the tongue every 5 minutes as needed for Chest pain 25 tablet 3    OXYGEN Inhale 2 L into the lungs as needed      Handicap Placard MISC by Does not apply route Duration: 5 years 1 each 0    ketoconazole (NIZORAL) 2 % cream Apply topically daily. (Patient taking differently: as needed Apply topically daily.) 30 g 1    omeprazole (PRILOSEC) 20 MG capsule Take 20 mg by mouth daily. No current facility-administered medications on file prior to visit. REVIEW OF SYSTEMS:    CONSTITUTIONAL: Negative for fevers and chills  HEENT: Negative for oropharyngeal exudate, post nasal drip, sinus pain / pressure, nasal congestion, ear pain  RESPIRATORY:  See HPI  CARDIOVASCULAR: Negative for chest pain, palpitations, edema  GASTROINTESTINAL: Negative for nausea, vomiting, diarrhea, constipation and abdominal pain  HEMATOLOGICAL: Negative for adenopathy  SKIN: Negative for clubbing, cyanosis, skin lesions  EXTREMITIES: Negative for weakness, decreased ROM  NEUROLOGICAL: Negative for unilateral weakness, speech or gait abnormalities    Objective:   PHYSICAL EXAM:        VITALS:  Pulse 55   Temp 96.9 °F (36.1 °C)   Resp 18   Ht 5' (1.524 m)   Wt 108 lb (49 kg)   SpO2 90% Comment: on 2  Breastfeeding No   BMI 21.09 kg/m²   On 3 L oxygen  CONSTITUTIONAL:  Awake, alert, cooperative, no apparent distress, and appears stated age  HEENT: No oropharyngeal exudate, PERRL, no cervical adenopathy, no tracheal deviation, thyroid size normal  LUNGS:  No increased work of breathing. Bilateral basilar crackles  CARDIOVASCULAR:  normal S1 and S2 and no JVD  ABDOMEN:  Normal bowel sounds, non-distended and non-tender to palpation  EXT: No edema, no calf tenderness. Pulses are present bilaterally.   NEUROLOGIC:  Mental Status Exam:  Level of Alertness:   awake  Orientation:   person, place, time.  SKIN:  normal skin color, texture, turgor, no redness, warmth, or swelling     DATA:      Radiology Review:  Pertinent images / reports were reviewed as a part of this visit. CT chest dated May 10, 2017 reveals the following:  FINDINGS:   Mediastinum: Limited evaluation without IV contrast.  Heavy coronary artery   calcifications are noted.  No significant pericardial effusion is   appreciated.  Large hiatal hernia is present.       HRCT Findings/Lungs/pleura: Routine images through the lung fields   demonstrate no focal dense consolidation.  High-resolution images through the   lungs demonstrate diffuse moderate bronchiectasis involving upper lobes and   lower lobes and right middle lobe.  There are scattered areas of nonspecific   ground-glass.  Peripheral subpleural reticular lines are noted these are   present bilaterally.  With a lower lobe predominance, there are areas of   honeycomb lung.  Significant pattern of air trapping not appreciated   expiration images.       Upper Abdomen: Limited evaluation without acute finding.       Soft Tissues/Bones: Moderate diffuse degenerative changes are noted about   spine.           Impression   Extensive changes are noted bilaterally suggestive of interstitial lung   disease. Jacquenette Gonzalo is most suggestive of usual interstitial pneumonitis   pattern.  Clinical correlation and follow-up is recommended.       Heavy coronary artery calcifications are noted.       Large hiatal hernia is present. CXR 4/4/2019  COMPARISON: March 16, 2017       Findings: PA and lateral views of the chest were obtained. The trachea is midline and the cardiac silhouette is stable in size, remaining mildly enlarged. There is atherosclerotic calcification of the aortic arch. Prominence of the interstitial markings    throughout both lungs is again noted, presumably related to interstitial lung disease per history. There is no confluent pulmonary infiltrate. No effusion or pneumothorax.  The osseous structures of the chest are intact.           Impression   Impression: Bilateral interstitial prominence diffusely, likely related to pulmonary fibrosis per history. Last PFTs:  DATE OF PROCEDURE:  04/09/2019     INTERPRETATION:  Spirometry reveals decreased FVC at 1.33 liters, which  is 70% predicted. FEV1 is decreased at 1.08 liters, which is 78%  predicted. FEV1/FVC ratio is normal.  There is a no significant change  after inhaled bronchodilators. Lung volumes reveal decreased total lung  capacity at 58% predicted, vital capacity is decreased at 66% predicted  and residual volume is decreased at 51% predicted. Diffusion capacity  is markedly decreased at 33% predicted.     IMPRESSION:  Moderate to severe restrictive lung disease.     Assessment:      Diagnosis Orders   1. IPF (idiopathic pulmonary fibrosis) ([facility] [address]  CT CHEST WO CONTRAST    COVID-19 Ambulatory    Full PFT Study Without Bronchdilator    Carbon Monoxide Diffusing Capacity   2. Chronic hypoxemic respiratory failure (HCC)     3. CAGLE (dyspnea on exertion)     4. Chronic cough         Plan:     1. Stop Ofev to see if that helps with her anorexia and weight loss. Suggest Robitussin-DM for cough  2 . Continue Prilosec 20 mg daily  3. Check CT chest and PFTs to assess for progression of IPF. Check COVID 19 for screening purposes due to PFTs  4. Continue portable oxygen concentrator  5. She has a pulse oximeter and monitors oxygen saturation with exertion. She knows the goal oxygen saturation is 88% or higher  6. She is up to date with her Prevnar 15, influenza and Pneumovax vaccinations  7.   Return to my office in 1 month or sooner if needed    Ultimately I am concerned about progression of IPF but want to rule out other causes such as infection first

## 2020-08-20 ENCOUNTER — OFFICE VISIT (OUTPATIENT)
Dept: PRIMARY CARE CLINIC | Age: 85
End: 2020-08-20
Payer: MEDICARE

## 2020-08-20 PROCEDURE — G8428 CUR MEDS NOT DOCUMENT: HCPCS | Performed by: NURSE PRACTITIONER

## 2020-08-20 PROCEDURE — 99211 OFF/OP EST MAY X REQ PHY/QHP: CPT | Performed by: NURSE PRACTITIONER

## 2020-08-20 PROCEDURE — G8420 CALC BMI NORM PARAMETERS: HCPCS | Performed by: NURSE PRACTITIONER

## 2020-08-20 NOTE — PATIENT INSTRUCTIONS

## 2020-08-21 LAB — SARS-COV-2, NAA: NOT DETECTED

## 2020-08-22 RX ORDER — FOLIC ACID 1 MG/1
TABLET ORAL
Qty: 180 TABLET | Refills: 2 | Status: SHIPPED | OUTPATIENT
Start: 2020-08-22

## 2020-08-22 RX ORDER — LEVOTHYROXINE SODIUM 0.05 MG/1
50 TABLET ORAL DAILY
Qty: 90 TABLET | Refills: 2 | Status: SHIPPED | OUTPATIENT
Start: 2020-08-22 | End: 2021-06-01 | Stop reason: SDUPTHER

## 2020-08-26 ENCOUNTER — HOSPITAL ENCOUNTER (OUTPATIENT)
Dept: CT IMAGING | Age: 85
Discharge: HOME | End: 2020-08-26
Payer: MEDICARE

## 2020-08-26 ENCOUNTER — HOSPITAL ENCOUNTER (OUTPATIENT)
Dept: PULMONOLOGY | Age: 85
Discharge: HOME | End: 2020-08-26
Payer: MEDICARE

## 2020-08-26 VITALS — HEART RATE: 74 BPM | OXYGEN SATURATION: 96 % | RESPIRATION RATE: 16 BRPM

## 2020-08-26 LAB
DLCO %PRED: NORMAL %
DLCO PRED: NORMAL
DLCO/VA %PRED: NORMAL
DLCO/VA PRED: NORMAL
DLCO/VA: NORMAL
DLCO: NORMAL
EXPIRATORY TIME: NORMAL
FEF 25-75% %PRED-PRE: NORMAL
FEF 25-75% PRED: NORMAL
FEF 25-75%-PRE: NORMAL
FEV1 %PRED-PRE: 59 %
FEV1 PRED: NORMAL
FEV1/FVC %PRED-PRE: NORMAL
FEV1/FVC PRED: NORMAL
FEV1/FVC: 119 %
FEV1: NORMAL
FVC %PRED-PRE: NORMAL
FVC PRED: NORMAL
FVC: NORMAL
GAW %PRED: NORMAL
GAW PRED: NORMAL
GAW: NORMAL
IC %PRED: NORMAL
IC PRED: NORMAL
IC: NORMAL
MVV %PRED-PRE: NORMAL
MVV PRED: NORMAL
MVV-PRE: NORMAL
PEF %PRED-PRE: NORMAL
PEF PRED: NORMAL
PEF-PRE: NORMAL
RAW %PRED: NORMAL
RAW PRED: NORMAL
RAW: NORMAL
RV %PRED: NORMAL
RV PRED: NORMAL
RV: NORMAL
SVC %PRED: NORMAL
SVC PRED: NORMAL
SVC: NORMAL
TLC %PRED: 51 %
TLC PRED: NORMAL
TLC: NORMAL
VA %PRED: NORMAL
VA PRED: NORMAL
VA: NORMAL
VTG %PRED: NORMAL
VTG PRED: NORMAL
VTG: NORMAL

## 2020-08-26 PROCEDURE — 94729 DIFFUSING CAPACITY: CPT

## 2020-08-26 PROCEDURE — 94200 LUNG FUNCTION TEST (MBC/MVV): CPT

## 2020-08-26 PROCEDURE — 71250 CT THORAX DX C-: CPT

## 2020-08-26 PROCEDURE — 94010 BREATHING CAPACITY TEST: CPT

## 2020-08-26 PROCEDURE — 94726 PLETHYSMOGRAPHY LUNG VOLUMES: CPT

## 2020-08-26 PROCEDURE — 94760 N-INVAS EAR/PLS OXIMETRY 1: CPT

## 2020-08-26 ASSESSMENT — PULMONARY FUNCTION TESTS
FEV1/FVC: 119
FEV1_PERCENT_PREDICTED_PRE: 59

## 2020-08-27 ENCOUNTER — OFFICE VISIT (OUTPATIENT)
Dept: PULMONOLOGY | Age: 85
End: 2020-08-27
Payer: MEDICARE

## 2020-08-27 VITALS
WEIGHT: 109 LBS | HEART RATE: 85 BPM | OXYGEN SATURATION: 91 % | HEIGHT: 60 IN | TEMPERATURE: 97.3 F | BODY MASS INDEX: 21.4 KG/M2

## 2020-08-27 PROCEDURE — 1123F ACP DISCUSS/DSCN MKR DOCD: CPT | Performed by: INTERNAL MEDICINE

## 2020-08-27 PROCEDURE — 1090F PRES/ABSN URINE INCON ASSESS: CPT | Performed by: INTERNAL MEDICINE

## 2020-08-27 PROCEDURE — G8420 CALC BMI NORM PARAMETERS: HCPCS | Performed by: INTERNAL MEDICINE

## 2020-08-27 PROCEDURE — G8427 DOCREV CUR MEDS BY ELIG CLIN: HCPCS | Performed by: INTERNAL MEDICINE

## 2020-08-27 PROCEDURE — 99214 OFFICE O/P EST MOD 30 MIN: CPT | Performed by: INTERNAL MEDICINE

## 2020-08-27 PROCEDURE — 1036F TOBACCO NON-USER: CPT | Performed by: INTERNAL MEDICINE

## 2020-08-27 PROCEDURE — 4040F PNEUMOC VAC/ADMIN/RCVD: CPT | Performed by: INTERNAL MEDICINE

## 2020-08-27 NOTE — PROGRESS NOTES
rosuvastatin (CRESTOR) 20 MG tablet TAKE ONE TABLET BY MOUTH ONCE NIGHTLY 90 tablet 1    apixaban (ELIQUIS) 2.5 MG TABS tablet Take 1 tablet by mouth 2 times daily 180 tablet 3    isosorbide mononitrate (IMDUR) 30 MG extended release tablet TAKE ONE TABLET BY MOUTH DAILY 90 tablet 3    Nintedanib Esylate 100 MG CAPS Take 100 mg by mouth daily 90 capsule 3    nitroGLYCERIN (NITROSTAT) 0.4 MG SL tablet Place 1 tablet under the tongue every 5 minutes as needed for Chest pain 25 tablet 3    OXYGEN Inhale 2 L into the lungs as needed      Handicap Placard MISC by Does not apply route Duration: 5 years 1 each 0    ketoconazole (NIZORAL) 2 % cream Apply topically daily. (Patient taking differently: as needed Apply topically daily.) 30 g 1    omeprazole (PRILOSEC) 20 MG capsule Take 20 mg by mouth daily. No current facility-administered medications on file prior to visit. REVIEW OF SYSTEMS:    CONSTITUTIONAL: Negative for fevers and chills  HEENT: Negative for oropharyngeal exudate, post nasal drip, sinus pain / pressure, nasal congestion, ear pain  RESPIRATORY:  See HPI  CARDIOVASCULAR: Negative for chest pain, palpitations, edema  GASTROINTESTINAL: Negative for nausea, vomiting, diarrhea, constipation and abdominal pain  HEMATOLOGICAL: Negative for adenopathy  SKIN: Negative for clubbing, cyanosis, skin lesions  EXTREMITIES: Negative for weakness, decreased ROM  NEUROLOGICAL: Negative for unilateral weakness, speech or gait abnormalities    Objective:   PHYSICAL EXAM:        VITALS:  Pulse 85   Temp 97.3 °F (36.3 °C)   Ht 5' (1.524 m)   Wt 109 lb (49.4 kg)   SpO2 91%   BMI 21.29 kg/m²   On 3 L oxygen  CONSTITUTIONAL:  Awake, alert, cooperative, no apparent distress, and appears stated age  HEENT: No oropharyngeal exudate, PERRL, no cervical adenopathy, no tracheal deviation, thyroid size normal  LUNGS:  Mild increased work of breathing.   Bilateral basilar crackles  CARDIOVASCULAR:  normal S1 and S2 and no JVD  ABDOMEN:  Normal bowel sounds, non-distended and non-tender to palpation  EXT: No edema, no calf tenderness. Pulses are present bilaterally. NEUROLOGIC:  Mental Status Exam:  Level of Alertness:   awake  Orientation:   person, place, time. SKIN:  normal skin color, texture, turgor, no redness, warmth, or swelling     DATA:      Radiology Review:  Pertinent images / reports were reviewed as a part of this visit. CT Chest 8/26/2020     Impression    Progressive pulmonary fibrosis         Mild mediastinal adenopathy, nonspecific.  This can be an incidental finding    in patients with pulmonary fibrosis. Fernando Mota finding of lymphoproliferative    disorder could also have this appearance.  Metastatic adenopathy is    considered less likely in the absence of a known primary. CT chest dated May 10, 2017 reveals the following:  FINDINGS:   Mediastinum: Limited evaluation without IV contrast.  Heavy coronary artery   calcifications are noted.  No significant pericardial effusion is   appreciated.  Large hiatal hernia is present.       HRCT Findings/Lungs/pleura: Routine images through the lung fields   demonstrate no focal dense consolidation.  High-resolution images through the   lungs demonstrate diffuse moderate bronchiectasis involving upper lobes and   lower lobes and right middle lobe.  There are scattered areas of nonspecific   ground-glass.  Peripheral subpleural reticular lines are noted these are   present bilaterally.  With a lower lobe predominance, there are areas of   honeycomb lung.  Significant pattern of air trapping not appreciated   expiration images.       Upper Abdomen: Limited evaluation without acute finding.       Soft Tissues/Bones: Moderate diffuse degenerative changes are noted about   spine.           Impression   Extensive changes are noted bilaterally suggestive of interstitial lung   disease. Keary Dice is most suggestive of usual interstitial pneumonitis   pattern.  Clinical correlation and follow-up is recommended.       Heavy coronary artery calcifications are noted.       Large hiatal hernia is present. CXR 4/4/2019  COMPARISON: March 16, 2017       Findings: PA and lateral views of the chest were obtained. The trachea is midline and the cardiac silhouette is stable in size, remaining mildly enlarged. There is atherosclerotic calcification of the aortic arch. Prominence of the interstitial markings    throughout both lungs is again noted, presumably related to interstitial lung disease per history. There is no confluent pulmonary infiltrate. No effusion or pneumothorax. The osseous structures of the chest are intact.           Impression   Impression: Bilateral interstitial prominence diffusely, likely related to pulmonary fibrosis per history. Last PFTs:  Spirometry:  Flow volume loops were normal. The FEV-1/FVC ratio was normal. The FEV-1 was 0.79 liters (59% of predicted), which was moderately decreased. The FVC was 0.92 liters (50% of predicted), which was decreased. Response to inhaled bronchodilators (albuterol) was not performed.     Lung volumes:  Lung volumes were tested by plethysmography. The total lung capacity was 1.97 liters (51% of predicted), which was decreased. The residual volume was 1.05 liters (60% of predicted), which was decreased. The ratio of residual volume to total lung capacity (RV/TLC) was 53, which was increased.      Diffusion capacity was not performed.      Interpretation:  Severe restriction noted, DLCO not performed due to pt reported fatigue and shortness of breath. Clinically correlate for ILD. DATE OF PROCEDURE:  04/09/2019     INTERPRETATION:  Spirometry reveals decreased FVC at 1.33 liters, which  is 70% predicted. FEV1 is decreased at 1.08 liters, which is 78%  predicted. FEV1/FVC ratio is normal.  There is a no significant change  after inhaled bronchodilators.   Lung volumes reveal decreased total lung  capacity at 58% predicted, vital capacity is decreased at 66% predicted  and residual volume is decreased at 51% predicted. Diffusion capacity  is markedly decreased at 33% predicted.     IMPRESSION:  Moderate to severe restrictive lung disease.     Assessment:      Diagnosis Orders   1. IPF (idiopathic pulmonary fibrosis) ([facility] [address]     2. Chronic hypoxemic respiratory failure (HCC)     3. CAGLE (dyspnea on exertion)         Plan:     1. CT Chest shows progressive IPF and no indication of pneumonia. PFTs show worsening restriction. SHe wants to resume Ofev QOD which is reasonable  2 . Continue Prilosec 20 mg daily  3. We talked about goals of care. She does not want heroic measures such as CPR and Vent. Her life partner was present to hear this. I have asked her to make sure she has paperwork stating this. I talked to her about palliative care and she is interested. Will send referral to [address] [place] [address]  4. Continue portable oxygen concentrator  5. She has a pulse oximeter and monitors oxygen saturation with exertion. She knows the goal oxygen saturation is 88% or higher  6. She is up to date with her Prevnar 15, influenza and Pneumovax vaccinations  7.   Return to my office in 3 month or sooner if needed

## 2020-08-27 NOTE — PROCEDURES
Pulmonary Function Testing      Patient name:  Jazmyne Mota     [facility] Unit #:   [id number]   Date of test:  8/26/2020  Date of interpretation:   8/27/2020    Ms. Jazmyne Mota is a 80y.o. year-old non smoker. The spirometry data were acceptable and reproducible. Spirometry:  Flow volume loops were normal. The FEV-1/FVC ratio was normal. The FEV-1 was 0.79 liters (59% of predicted), which was moderately decreased. The FVC was 0.92 liters (50% of predicted), which was decreased. Response to inhaled bronchodilators (albuterol) was not performed. Lung volumes:  Lung volumes were tested by plethysmography. The total lung capacity was 1.97 liters (51% of predicted), which was decreased. The residual volume was 1.05 liters (60% of predicted), which was decreased. The ratio of residual volume to total lung capacity (RV/TLC) was 53, which was increased. Diffusion capacity was not performed. Interpretation:  Severe restriction noted, DLCO not performed due to pt reported fatigue and shortness of breath. Clinically correlate for ILD.     Comments:

## 2020-10-30 ENCOUNTER — OFFICE VISIT (OUTPATIENT)
Dept: CARDIOLOGY CLINIC | Age: 85
End: 2020-10-30
Payer: MEDICARE

## 2020-10-30 VITALS
TEMPERATURE: 97.3 F | HEART RATE: 60 BPM | BODY MASS INDEX: 20.9 KG/M2 | DIASTOLIC BLOOD PRESSURE: 60 MMHG | SYSTOLIC BLOOD PRESSURE: 122 MMHG | WEIGHT: 107 LBS

## 2020-10-30 PROCEDURE — 4040F PNEUMOC VAC/ADMIN/RCVD: CPT | Performed by: INTERNAL MEDICINE

## 2020-10-30 PROCEDURE — 1090F PRES/ABSN URINE INCON ASSESS: CPT | Performed by: INTERNAL MEDICINE

## 2020-10-30 PROCEDURE — 99214 OFFICE O/P EST MOD 30 MIN: CPT | Performed by: INTERNAL MEDICINE

## 2020-10-30 PROCEDURE — G8420 CALC BMI NORM PARAMETERS: HCPCS | Performed by: INTERNAL MEDICINE

## 2020-10-30 PROCEDURE — 1123F ACP DISCUSS/DSCN MKR DOCD: CPT | Performed by: INTERNAL MEDICINE

## 2020-10-30 PROCEDURE — G8427 DOCREV CUR MEDS BY ELIG CLIN: HCPCS | Performed by: INTERNAL MEDICINE

## 2020-10-30 PROCEDURE — G8484 FLU IMMUNIZE NO ADMIN: HCPCS | Performed by: INTERNAL MEDICINE

## 2020-10-30 PROCEDURE — 1036F TOBACCO NON-USER: CPT | Performed by: INTERNAL MEDICINE

## 2020-10-30 ASSESSMENT — ENCOUNTER SYMPTOMS
CHEST TIGHTNESS: 0
CHOKING: 0
SHORTNESS OF BREATH: 1
COUGH: 0

## 2020-10-30 NOTE — PROGRESS NOTES
Subjective:      Patient ID: Tito Estrada is a 80 y.o. female. Coronary Artery Disease   Symptoms include shortness of breath. Pertinent negatives include no chest pain, chest tightness, dizziness, leg swelling or palpitations. Hypertension   Associated symptoms include shortness of breath. Pertinent negatives include no chest pain or palpitations. Shortness of Breath   Pertinent negatives include no chest pain or leg swelling. Her past medical history is significant for CAD. Former pt of Dr Rico Killian. F/U HTN/CAD/sob/interstitial lung disease/hyperlipdemia. Breathing remains an issue. She denies chest pain. BP good at home. No pnd or orthopnea  No palp/tachycardia/syncope.  Follows with Dr Saji Rendon    Past Medical History:   Diagnosis Date    Bilateral cataracts 9/17/2015    Cholelithiasis     Coronary artery disease     Hyperlipidemia     Hypertension     Hypothyroid 4/11/2014    IPF (idiopathic pulmonary fibrosis) ([facility] [address] 6/12/2017    Thyroid nodule     Wears dentures     upper    Wears glasses      Past Surgical History:   Procedure Laterality Date    CHOLECYSTECTOMY, LAPAROSCOPIC  8/14/2012    MULTIPORT ROBOTIC ASSISTED LAPAROSCOPIC CHOLECYSTECTOMY WITH    CORONARY ANGIOPLASTY WITH STENT PLACEMENT  07/2003    HUMERUS FRACTURE SURGERY Right 9/27/2019    OPEN REDUCTION INTERNAL FIXATION RIGHT PROXIMAL HUMERUS WITH C-ARM performed by Princess Dey MD at [address] [facility], PARTIAL  11/1985    parathyroidectomy    TONSILLECTOMY      WISDOM TOOTH EXTRACTION       Social History     Socioeconomic History    Marital status: Single     Spouse name: Not on file    Number of children: 0    Years of education: Not on file    Highest education level: Not on file   Occupational History    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[employer]   Social Needs    Financial resource strain: Not on file    Food insecurity     Worry: Not on file     Inability: Not on file    Transportation needs     Medical: Not on file     Non-medical: Not on file   Tobacco Use    Smoking status: Former Smoker     Last attempt to quit: 1985     Years since quittin.2    Smokeless tobacco: Never Used   Substance and Sexual Activity    Alcohol use: Not Currently    Drug use: No    Sexual activity: Not Currently   Lifestyle    Physical activity     Days per week: Not on file     Minutes per session: Not on file    Stress: Not on file   Relationships    Social connections     Talks on phone: Not on file     Gets together: Not on file     Attends Episcopal service: Not on file     Active member of club or organization: Not on file     Attends meetings of clubs or organizations: Not on file     Relationship status: Not on file    Intimate partner violence     Fear of current or ex partner: Not on file     Emotionally abused: Not on file     Physically abused: Not on file     Forced sexual activity: Not on file   Other Topics Concern    Not on file   Social History Narrative    Lives with room mate    Retired    Travels and golf      [place] reviewed. Denies FH cardiac issues. Vitals:    10/30/20 0948   Temp: 97.3 °F (36.3 °C)      Wt 107    Review of Systems   Constitutional: Negative for activity change, appetite change and fatigue. Respiratory: Positive for shortness of breath. Negative for cough, choking and chest tightness. Cardiovascular: Negative for chest pain, palpitations and leg swelling. Denies PND or orthopnea. No tachycardia or syncope. Neurological: Negative for dizziness, syncope and light-headedness. Psychiatric/Behavioral: Negative for agitation, behavioral problems and confusion. All other systems reviewed and are negative. Objective:   Physical Exam   Constitutional: She is oriented to person, place, and time. She appears well-developed and well-nourished. No distress. HENT:   Head: Normocephalic and atraumatic.    Eyes: Conjunctivae and EOM are normal. Right eye exhibits no discharge. Left eye exhibits no discharge. Neck: Normal range of motion. No JVD present. Cardiovascular: Normal rate, regular rhythm, S1 normal, S2 normal and normal heart sounds. Exam reveals no gallop. No murmur heard. Pulses:       Radial pulses are 2+ on the right side and 2+ on the left side. Pulmonary/Chest: Effort normal. No respiratory distress. She has decreased breath sounds. She has no wheezes. She has no rales. Dry crackles. Abdominal: Soft. Bowel sounds are normal. She exhibits no distension. There is no abdominal tenderness. Musculoskeletal: Normal range of motion. General: No edema. Neurological: She is alert and oriented to person, place, and time. Skin: Skin is warm and dry. Psychiatric: She has a normal mood and affect. Her behavior is normal. Thought content normal.         Assessment:       Diagnosis Orders   1. Essential hypertension     2. CAD in native artery     3. Paroxysmal atrial fibrillation (HCC)     4. Interstitial lung disease ([address]     5. Hyperlipidemia, unspecified hyperlipidemia type             Plan:      CV  stable. Neuro unchanged and at baseline after multi distribution ischemic stroke 11/19. Breathing issues. Follow with Pulmonary. BP good. No chest pain. Lipids per PCP. 30 day monitor showing pafib. Eliquis 2.5 mg bid. No bleeding issues. Reviewed previous records and testing. No changes. Continue to monitor. F/U 3 months.

## 2020-12-08 ENCOUNTER — TELEPHONE (OUTPATIENT)
Dept: PULMONOLOGY | Age: 85
End: 2020-12-08

## 2020-12-09 DIAGNOSIS — I10 ESSENTIAL HYPERTENSION: ICD-10-CM

## 2020-12-09 DIAGNOSIS — E78.5 HYPERLIPIDEMIA, UNSPECIFIED HYPERLIPIDEMIA TYPE: ICD-10-CM

## 2020-12-10 ENCOUNTER — TELEPHONE (OUTPATIENT)
Dept: CARDIOLOGY CLINIC | Age: 85
End: 2020-12-10

## 2020-12-10 NOTE — TELEPHONE ENCOUNTER
1.  It is okay for her to turn up her oxygen to 3 L. Goal oxygen saturation is 88% or higher. 2.  It is doubtful that her lung issues are causing visual blurriness.

## 2020-12-11 RX ORDER — NITROGLYCERIN 0.4 MG/1
TABLET SUBLINGUAL
Qty: 25 TABLET | Refills: 1 | Status: SHIPPED | OUTPATIENT
Start: 2020-12-11

## 2020-12-11 RX ORDER — ISOSORBIDE MONONITRATE 30 MG/1
TABLET, EXTENDED RELEASE ORAL
Qty: 90 TABLET | Refills: 3 | Status: SHIPPED | OUTPATIENT
Start: 2020-12-11

## 2021-01-04 ENCOUNTER — VIRTUAL VISIT (OUTPATIENT)
Dept: PULMONOLOGY | Age: 86
End: 2021-01-04
Payer: MEDICARE

## 2021-01-04 DIAGNOSIS — J96.11 CHRONIC HYPOXEMIC RESPIRATORY FAILURE (HCC): ICD-10-CM

## 2021-01-04 DIAGNOSIS — R06.09 DOE (DYSPNEA ON EXERTION): ICD-10-CM

## 2021-01-04 DIAGNOSIS — J84.112 IPF (IDIOPATHIC PULMONARY FIBROSIS) (HCC): Primary | ICD-10-CM

## 2021-01-04 PROCEDURE — 99443 PR PHYS/QHP TELEPHONE EVALUATION 21-30 MIN: CPT | Performed by: INTERNAL MEDICINE

## 2021-01-04 NOTE — Clinical Note
I believe Elizabeth Analy has used [employer] in the past. Can we see if they can deliver an oxygen concentrator? In August we made a referral to [address] [place] [address]. There is a scanned referral in the media tab.  Can we re-refer Elizabeth Beckford to [facility]

## 2021-01-04 NOTE — PROGRESS NOTES
Pulmonology Telephone Visit    Pursuant to the emergency declaration under the [facility] waiver authority and the [employer] General Act this Telephone Visit was insisted, with patient's consent, to reduce the patient's risk of exposure to COVID-19 and provide continuity of care for an established patient. The patient was at home, while the provider was at the clinic. Services were provided through a synchronous discussion through a Telephone Call to substitute for in-person clinic visit, and coded as such. Total time spent with patient was 23 minutes. [facility] Pulmonary and Critical Care    Outpatient Follow Up Note    Subjective:   CHIEF COMPLAINT / HPI:     The patient is 80 y.o. female whom asked for a telephone visit for follow-up of IPF. Unfortunately Christianne Christianson continues to gradually deteriorate and now gets quite dyspneic walking room to room. Due to diarrhea and weight loss she has stopped her Ofev again. She is asking if she can get a oxygen concentrator for her living room. She has one in her bedroom that she states that she has purchased as well as an [employer] that she purchased. Last visit I made a referral to [facility] but she ultimately decided to not pursue it at that time. Her significant other, Arthurine Marker, would like to reevaluate that at this time    8/27/2020   is here for follow up of gradual worsening of CAGLE in setting of IPF. She had PFTs and CT Chest. There has been no change in dyspnea with minimal exertion. She has no fevers, chills, sputum production, hemoptysis, CP or wheezing.  Since stopping Ofev she has gained a pound and appetite is some better    8/5/2020 Warren Arguello presents today for follow up of IPF. She continues Ofev 100 mg a day and oxygen at 3 L flow. Since last visit she has had worsened dyspnea on exertion, worsening cough with clear phlegm, worsening anorexia, and continued weight loss. Her current weight is 108 down from 120s approximately a year ago. She does not seem to be having a significant amount of diarrhea. She has had anorexia and nausea from Ofev in past requiring temporary discontinuation and resumption at lower dose. She has no fevers, chills, Purulent sputum, hemoptysis, wheezing, or chest pain. Past Medical History:    Past Medical History:   Diagnosis Date    Bilateral cataracts 2015    Cholelithiasis     Coronary artery disease     Hyperlipidemia     Hypertension     Hypothyroid 2014    [facility]    Thyroid nodule     Wears dentures     upper    Wears glasses        Social History:    Social History     Tobacco Use   Smoking Status Former Smoker    Quit date: 1985    Years since quittin.4   Smokeless Tobacco Never Used       Current Medications:  Current Outpatient Medications on File Prior to Visit   Medication Sig Dispense Refill    isosorbide mononitrate (IMDUR) 30 MG extended release tablet TAKE ONE TABLET BY MOUTH DAILY 90 tablet 3    nitroGLYCERIN (NITROSTAT) 0.4 MG SL tablet DISSOLVE 1 TAB UNDER TONGUE FOR CHEST PAIN - IF PAIN REMAINS AFTER 5 MIN, CALL 911 AND REPEAT DOSE.  MAX 3 TABS IN 15 MINUTES 25 tablet 1    levothyroxine (SYNTHROID) 50 MCG tablet Take 1 tablet by mouth Daily 90 tablet 2    folic acid (FOLVITE) 1 MG tablet One  tablet 2    metoprolol tartrate (LOPRESSOR) 25 MG tablet TAKE ONE AND ONE-HALF TABLET BY MOUTH TWICE A  tablet 3    rosuvastatin (CRESTOR) 20 MG tablet TAKE ONE TABLET BY MOUTH ONCE NIGHTLY 90 tablet 1    apixaban (ELIQUIS) 2.5 MG TABS tablet Take 1 tablet by mouth 2 times daily 180 tablet 3  Nintedanib Esylate 100 MG CAPS Take 100 mg by mouth daily 90 capsule 3    OXYGEN Inhale 2 L into the lungs as needed      Handicap Placard MISC by Does not apply route Duration: 5 years 1 each 0    omeprazole (PRILOSEC) 20 MG capsule Take 20 mg by mouth daily.  ketoconazole (NIZORAL) 2 % cream Apply topically daily. (Patient not taking: Reported on 1/4/2021) 30 g 1     No current facility-administered medications on file prior to visit. REVIEW OF SYSTEMS:    CONSTITUTIONAL: Negative for fevers and chills  HEENT: Negative for oropharyngeal exudate, post nasal drip, sinus pain / pressure, nasal congestion, ear pain  RESPIRATORY:  See HPI  CARDIOVASCULAR: Negative for chest pain, palpitations, edema  GASTROINTESTINAL: Negative for nausea, vomiting, diarrhea, constipation and abdominal pain  HEMATOLOGICAL: Negative for adenopathy  SKIN: Negative for clubbing, cyanosis, skin lesions  EXTREMITIES: Negative for weakness, decreased ROM  NEUROLOGICAL: Negative for unilateral weakness, speech or gait abnormalities    Objective:   PHYSICAL EXAM:        VITALS:  There were no vitals taken for this visit. Telephone visit -no exam possible    DATA:      Radiology Review:  Pertinent images / reports were reviewed as a part of this visit. CT Chest 8/26/2020     Impression    Progressive pulmonary fibrosis         Mild mediastinal adenopathy, nonspecific.  This can be an incidental finding    in patients with pulmonary fibrosis. Rubina Ip finding of lymphoproliferative    disorder could also have this appearance.  Metastatic adenopathy is    considered less likely in the absence of a known primary.         CT chest dated May 10, 2017 reveals the following:  FINDINGS:   Mediastinum: Limited evaluation without IV contrast.  Heavy coronary artery   calcifications are noted.  No significant pericardial effusion is   appreciated.  Large hiatal hernia is present.     Flow volume loops were normal. The FEV-1/FVC ratio was normal. The FEV-1 was 0.79 liters (59% of predicted), which was moderately decreased. The FVC was 0.92 liters (50% of predicted), which was decreased. Response to inhaled bronchodilators (albuterol) was not performed.     Lung volumes:  Lung volumes were tested by plethysmography. The total lung capacity was 1.97 liters (51% of predicted), which was decreased. The residual volume was 1.05 liters (60% of predicted), which was decreased. The ratio of residual volume to total lung capacity (RV/TLC) was 53, which was increased.      Diffusion capacity was not performed.      Interpretation:  Severe restriction noted, DLCO not performed due to pt reported fatigue and shortness of breath. Clinically correlate for ILD. DATE OF PROCEDURE:  04/09/2019     INTERPRETATION:  Spirometry reveals decreased FVC at 1.33 liters, which  is 70% predicted. FEV1 is decreased at 1.08 liters, which is 78%  predicted. FEV1/FVC ratio is normal.  There is a no significant change  after inhaled bronchodilators. Lung volumes reveal decreased total lung  capacity at 58% predicted, vital capacity is decreased at 66% predicted  and residual volume is decreased at 51% predicted. Diffusion capacity  is markedly decreased at 33% predicted.     IMPRESSION:  Moderate to severe restrictive lung disease.     Assessment:      Diagnosis Orders   1. IPF (idiopathic pulmonary fibrosis) ([address]     2. Chronic hypoxemic respiratory failure (HCC)     3. CAGLE (dyspnea on exertion)         Plan:     1. CT Chest shows progressive IPF and no indication of pneumonia. PFTs show worsening restriction. I agree at this point Lois White is likely not going to help and only cause further side effects  2 .  Continue Prilosec 20 mg daily 3.  We talked about goals of care. She does not want heroic measures such as CPR and Vent. Her life partner was present to hear this. I have asked her to make sure she has paperwork stating this. I will rerefer her to palliative care with  Nina  4. Continue portable oxygen concentrator. She has used [facility] in the past -we will see if they can provide an oxygen concentrator  5. She has a pulse oximeter and monitors oxygen saturation with exertion. She knows the goal oxygen saturation is 88% or higher  6. She is up to date with her Prevnar 15, and Pneumovax vaccinations  7.  Will have follow-up telephone call visit in 1 month or sooner if needed

## 2021-01-11 ENCOUNTER — TELEPHONE (OUTPATIENT)
Dept: PULMONOLOGY | Age: 86
End: 2021-01-11

## 2021-01-11 NOTE — TELEPHONE ENCOUNTER
Lm on vm as instructed by Tal Brooke (caregiver) of Dr Ton Espinosa ok for pt to have Covid vaccine. Also, let her know that information was faxed to [facility] and they should be reaching out soon.

## 2021-01-24 RX ORDER — ROSUVASTATIN CALCIUM 20 MG/1
20 TABLET, COATED ORAL DAILY
Qty: 90 TABLET | Refills: 3 | Status: SHIPPED | OUTPATIENT
Start: 2021-01-24 | End: 2021-01-25

## 2021-01-25 RX ORDER — ROSUVASTATIN CALCIUM 20 MG/1
TABLET, COATED ORAL
Qty: 90 TABLET | Refills: 3 | Status: SHIPPED | OUTPATIENT
Start: 2021-01-25

## 2021-01-25 NOTE — TELEPHONE ENCOUNTER
Requested Prescriptions     Pending Prescriptions Disp Refills    ELIQUIS 2.5 MG TABS tablet [Pharmacy Med Name: ELIQUIS 2.5 MG TABLET] 180 tablet 3     Sig: TAKE ONE TABLET BY MOUTH TWICE A DAY                  Last Office Visit: 10/30/2020     Next Office Visit: 2/19/2021

## 2021-01-27 RX ORDER — APIXABAN 2.5 MG/1
TABLET, FILM COATED ORAL
Qty: 180 TABLET | Refills: 3 | Status: SHIPPED | OUTPATIENT
Start: 2021-01-27

## 2021-02-12 ENCOUNTER — APPOINTMENT (OUTPATIENT)
Dept: CT IMAGING | Age: 86
DRG: 064 | End: 2021-02-12
Payer: MEDICARE

## 2021-02-12 ENCOUNTER — APPOINTMENT (OUTPATIENT)
Dept: GENERAL RADIOLOGY | Age: 86
DRG: 064 | End: 2021-02-12
Payer: MEDICARE

## 2021-02-12 ENCOUNTER — HOSPITAL ENCOUNTER (INPATIENT)
Age: 86
LOS: 3 days | Discharge: HOME | DRG: 064 | End: 2021-02-16
Attending: EMERGENCY MEDICINE | Admitting: HOSPITALIST
Payer: MEDICARE

## 2021-02-12 DIAGNOSIS — R55 SYNCOPE AND COLLAPSE: Primary | ICD-10-CM

## 2021-02-12 DIAGNOSIS — J96.21 ACUTE ON CHRONIC RESPIRATORY FAILURE WITH HYPOXIA (HCC): ICD-10-CM

## 2021-02-12 DIAGNOSIS — R79.89 ELEVATED LACTIC ACID LEVEL: ICD-10-CM

## 2021-02-12 DIAGNOSIS — R41.0 CONFUSION: ICD-10-CM

## 2021-02-12 LAB
A/G RATIO: 1.1 (ref 1.1–2.2)
ALBUMIN SERPL-MCNC: 3.4 G/DL (ref 3.4–5)
ALP BLD-CCNC: 87 U/L (ref 40–129)
ALT SERPL-CCNC: 9 U/L (ref 10–40)
ANION GAP SERPL CALCULATED.3IONS-SCNC: 11 MMOL/L (ref 3–16)
AST SERPL-CCNC: 20 U/L (ref 15–37)
BASOPHILS ABSOLUTE: 0.1 K/UL (ref 0–0.2)
BASOPHILS RELATIVE PERCENT: 1 %
BILIRUB SERPL-MCNC: 0.3 MG/DL (ref 0–1)
BUN BLDV-MCNC: 19 MG/DL (ref 7–20)
CALCIUM SERPL-MCNC: 8.8 MG/DL (ref 8.3–10.6)
CHLORIDE BLD-SCNC: 106 MMOL/L (ref 99–110)
CO2: 22 MMOL/L (ref 21–32)
CREAT SERPL-MCNC: 1.2 MG/DL (ref 0.6–1.2)
D DIMER: 448 NG/ML DDU (ref 0–229)
EOSINOPHILS ABSOLUTE: 0.3 K/UL (ref 0–0.6)
EOSINOPHILS RELATIVE PERCENT: 4.5 %
GFR AFRICAN AMERICAN: 51
GFR NON-AFRICAN AMERICAN: 42
GLOBULIN: 3.2 G/DL
GLUCOSE BLD-MCNC: 234 MG/DL (ref 70–99)
HCT VFR BLD CALC: 35.8 % (ref 36–48)
HEMOGLOBIN: 10.9 G/DL (ref 12–16)
INR BLD: 1.2 (ref 0.86–1.14)
LACTIC ACID, SEPSIS: 3.4 MMOL/L (ref 0.4–1.9)
LYMPHOCYTES ABSOLUTE: 0.9 K/UL (ref 1–5.1)
LYMPHOCYTES RELATIVE PERCENT: 15.2 %
MCH RBC QN AUTO: 25.6 PG (ref 26–34)
MCHC RBC AUTO-ENTMCNC: 30.5 G/DL (ref 31–36)
MCV RBC AUTO: 83.8 FL (ref 80–100)
MONOCYTES ABSOLUTE: 0.3 K/UL (ref 0–1.3)
MONOCYTES RELATIVE PERCENT: 5.9 %
NEUTROPHILS ABSOLUTE: 4.2 K/UL (ref 1.7–7.7)
NEUTROPHILS RELATIVE PERCENT: 73.4 %
PDW BLD-RTO: 18.3 % (ref 12.4–15.4)
PLATELET # BLD: 262 K/UL (ref 135–450)
PMV BLD AUTO: 7.6 FL (ref 5–10.5)
POTASSIUM REFLEX MAGNESIUM: 4.3 MMOL/L (ref 3.5–5.1)
PRO-BNP: 4405 PG/ML (ref 0–449)
PROTHROMBIN TIME: 14 SEC (ref 10–13.2)
RAPID INFLUENZA  B AGN: NEGATIVE
RAPID INFLUENZA A AGN: NEGATIVE
RBC # BLD: 4.27 M/UL (ref 4–5.2)
SARS-COV-2, NAAT: NOT DETECTED
SODIUM BLD-SCNC: 139 MMOL/L (ref 136–145)
TOTAL PROTEIN: 6.6 G/DL (ref 6.4–8.2)
TROPONIN: <0.01 NG/ML
WBC # BLD: 5.7 K/UL (ref 4–11)

## 2021-02-12 PROCEDURE — 87040 BLOOD CULTURE FOR BACTERIA: CPT

## 2021-02-12 PROCEDURE — 99284 EMERGENCY DEPT VISIT MOD MDM: CPT

## 2021-02-12 PROCEDURE — 6360000004 HC RX CONTRAST MEDICATION: Performed by: EMERGENCY MEDICINE

## 2021-02-12 PROCEDURE — 83605 ASSAY OF LACTIC ACID: CPT

## 2021-02-12 PROCEDURE — 71045 X-RAY EXAM CHEST 1 VIEW: CPT

## 2021-02-12 PROCEDURE — 70450 CT HEAD/BRAIN W/O DYE: CPT

## 2021-02-12 PROCEDURE — 96360 HYDRATION IV INFUSION INIT: CPT

## 2021-02-12 PROCEDURE — 87804 INFLUENZA ASSAY W/OPTIC: CPT

## 2021-02-12 PROCEDURE — 80053 COMPREHEN METABOLIC PANEL: CPT

## 2021-02-12 PROCEDURE — 83880 ASSAY OF NATRIURETIC PEPTIDE: CPT

## 2021-02-12 PROCEDURE — 85379 FIBRIN DEGRADATION QUANT: CPT

## 2021-02-12 PROCEDURE — 84484 ASSAY OF TROPONIN QUANT: CPT

## 2021-02-12 PROCEDURE — 85610 PROTHROMBIN TIME: CPT

## 2021-02-12 PROCEDURE — 71260 CT THORAX DX C+: CPT

## 2021-02-12 PROCEDURE — 36415 COLL VENOUS BLD VENIPUNCTURE: CPT

## 2021-02-12 PROCEDURE — 85025 COMPLETE CBC W/AUTO DIFF WBC: CPT

## 2021-02-12 PROCEDURE — U0002 COVID-19 LAB TEST NON-CDC: HCPCS

## 2021-02-12 RX ORDER — 0.9 % SODIUM CHLORIDE 0.9 %
1000 INTRAVENOUS SOLUTION INTRAVENOUS ONCE
Status: DISCONTINUED | OUTPATIENT
Start: 2021-02-12 | End: 2021-02-12

## 2021-02-12 RX ADMIN — IOPAMIDOL 75 ML: 755 INJECTION, SOLUTION INTRAVENOUS at 23:12

## 2021-02-13 PROBLEM — R55 SYNCOPE AND COLLAPSE: Status: ACTIVE | Noted: 2021-02-13

## 2021-02-13 LAB
ANION GAP SERPL CALCULATED.3IONS-SCNC: 6 MMOL/L (ref 3–16)
BILIRUBIN URINE: NEGATIVE
BLOOD, URINE: NEGATIVE
BUN BLDV-MCNC: 19 MG/DL (ref 7–20)
CALCIUM SERPL-MCNC: 9.2 MG/DL (ref 8.3–10.6)
CHLORIDE BLD-SCNC: 107 MMOL/L (ref 99–110)
CLARITY: ABNORMAL
CO2: 25 MMOL/L (ref 21–32)
COLOR: YELLOW
CREAT SERPL-MCNC: 1.1 MG/DL (ref 0.6–1.2)
EPITHELIAL CELLS, UA: 1 /HPF (ref 0–5)
ESTIMATED AVERAGE GLUCOSE: 137 MG/DL
FOLATE: >20 NG/ML (ref 4.78–24.2)
GFR AFRICAN AMERICAN: 57
GFR NON-AFRICAN AMERICAN: 47
GLUCOSE BLD-MCNC: 104 MG/DL (ref 70–99)
GLUCOSE BLD-MCNC: 82 MG/DL (ref 70–99)
GLUCOSE BLD-MCNC: 83 MG/DL (ref 70–99)
GLUCOSE BLD-MCNC: 98 MG/DL (ref 70–99)
GLUCOSE BLD-MCNC: 99 MG/DL (ref 70–99)
GLUCOSE URINE: NEGATIVE MG/DL
HBA1C MFR BLD: 6.4 %
HCT VFR BLD CALC: 33.4 % (ref 36–48)
HEMOGLOBIN: 10.4 G/DL (ref 12–16)
HYALINE CASTS: 2 /LPF (ref 0–8)
IRON SATURATION: 8 % (ref 15–50)
IRON: 27 UG/DL (ref 37–145)
KETONES, URINE: NEGATIVE MG/DL
LACTIC ACID: 0.6 MMOL/L (ref 0.4–2)
LEUKOCYTE ESTERASE, URINE: NEGATIVE
MCH RBC QN AUTO: 25.7 PG (ref 26–34)
MCHC RBC AUTO-ENTMCNC: 31.1 G/DL (ref 31–36)
MCV RBC AUTO: 82.6 FL (ref 80–100)
MICROSCOPIC EXAMINATION: YES
NITRITE, URINE: NEGATIVE
PDW BLD-RTO: 17.9 % (ref 12.4–15.4)
PERFORMED ON: ABNORMAL
PERFORMED ON: NORMAL
PH UA: 7 (ref 5–8)
PLATELET # BLD: 239 K/UL (ref 135–450)
PMV BLD AUTO: 7.5 FL (ref 5–10.5)
POTASSIUM REFLEX MAGNESIUM: 4.4 MMOL/L (ref 3.5–5.1)
PROTEIN UA: 30 MG/DL
RBC # BLD: 4.05 M/UL (ref 4–5.2)
RBC UA: 1 /HPF (ref 0–4)
SODIUM BLD-SCNC: 138 MMOL/L (ref 136–145)
SPECIFIC GRAVITY UA: >1.03 (ref 1–1.03)
TOTAL IRON BINDING CAPACITY: 318 UG/DL (ref 260–445)
TROPONIN: 0.01 NG/ML
TSH REFLEX: 2.26 UIU/ML (ref 0.27–4.2)
URINE REFLEX TO CULTURE: ABNORMAL
URINE TYPE: ABNORMAL
UROBILINOGEN, URINE: 1 E.U./DL
VITAMIN B-12: 303 PG/ML (ref 211–911)
WBC # BLD: 6 K/UL (ref 4–11)
WBC UA: 2 /HPF (ref 0–5)

## 2021-02-13 PROCEDURE — 83550 IRON BINDING TEST: CPT

## 2021-02-13 PROCEDURE — 85027 COMPLETE CBC AUTOMATED: CPT

## 2021-02-13 PROCEDURE — 94761 N-INVAS EAR/PLS OXIMETRY MLT: CPT

## 2021-02-13 PROCEDURE — 6370000000 HC RX 637 (ALT 250 FOR IP): Performed by: PHYSICIAN ASSISTANT

## 2021-02-13 PROCEDURE — 84443 ASSAY THYROID STIM HORMONE: CPT

## 2021-02-13 PROCEDURE — 99223 1ST HOSP IP/OBS HIGH 75: CPT | Performed by: INTERNAL MEDICINE

## 2021-02-13 PROCEDURE — 36415 COLL VENOUS BLD VENIPUNCTURE: CPT

## 2021-02-13 PROCEDURE — 82746 ASSAY OF FOLIC ACID SERUM: CPT

## 2021-02-13 PROCEDURE — 81001 URINALYSIS AUTO W/SCOPE: CPT

## 2021-02-13 PROCEDURE — 83036 HEMOGLOBIN GLYCOSYLATED A1C: CPT

## 2021-02-13 PROCEDURE — 93005 ELECTROCARDIOGRAM TRACING: CPT | Performed by: INTERNAL MEDICINE

## 2021-02-13 PROCEDURE — 2580000003 HC RX 258: Performed by: EMERGENCY MEDICINE

## 2021-02-13 PROCEDURE — 83540 ASSAY OF IRON: CPT

## 2021-02-13 PROCEDURE — 80048 BASIC METABOLIC PNL TOTAL CA: CPT

## 2021-02-13 PROCEDURE — 93880 EXTRACRANIAL BILAT STUDY: CPT

## 2021-02-13 PROCEDURE — 2580000003 HC RX 258: Performed by: PHYSICIAN ASSISTANT

## 2021-02-13 PROCEDURE — 1200000000 HC SEMI PRIVATE

## 2021-02-13 PROCEDURE — 84484 ASSAY OF TROPONIN QUANT: CPT

## 2021-02-13 PROCEDURE — 82607 VITAMIN B-12: CPT

## 2021-02-13 PROCEDURE — 83605 ASSAY OF LACTIC ACID: CPT

## 2021-02-13 PROCEDURE — 2700000000 HC OXYGEN THERAPY PER DAY

## 2021-02-13 RX ORDER — NICOTINE POLACRILEX 4 MG
15 LOZENGE BUCCAL PRN
Status: DISCONTINUED | OUTPATIENT
Start: 2021-02-13 | End: 2021-02-16 | Stop reason: HOSPADM

## 2021-02-13 RX ORDER — DEXTROSE MONOHYDRATE 25 G/50ML
12.5 INJECTION, SOLUTION INTRAVENOUS PRN
Status: DISCONTINUED | OUTPATIENT
Start: 2021-02-13 | End: 2021-02-16 | Stop reason: HOSPADM

## 2021-02-13 RX ORDER — PANTOPRAZOLE SODIUM 40 MG/1
40 TABLET, DELAYED RELEASE ORAL
Status: DISCONTINUED | OUTPATIENT
Start: 2021-02-13 | End: 2021-02-16 | Stop reason: HOSPADM

## 2021-02-13 RX ORDER — ISOSORBIDE MONONITRATE 30 MG/1
30 TABLET, EXTENDED RELEASE ORAL DAILY
Status: DISCONTINUED | OUTPATIENT
Start: 2021-02-13 | End: 2021-02-16 | Stop reason: HOSPADM

## 2021-02-13 RX ORDER — 0.9 % SODIUM CHLORIDE 0.9 %
500 INTRAVENOUS SOLUTION INTRAVENOUS ONCE
Status: COMPLETED | OUTPATIENT
Start: 2021-02-13 | End: 2021-02-13

## 2021-02-13 RX ORDER — LEVOTHYROXINE SODIUM 0.03 MG/1
50 TABLET ORAL DAILY
Status: DISCONTINUED | OUTPATIENT
Start: 2021-02-13 | End: 2021-02-16 | Stop reason: HOSPADM

## 2021-02-13 RX ORDER — ACETAMINOPHEN 325 MG/1
650 TABLET ORAL EVERY 4 HOURS PRN
Status: DISCONTINUED | OUTPATIENT
Start: 2021-02-13 | End: 2021-02-16 | Stop reason: HOSPADM

## 2021-02-13 RX ORDER — DEXTROSE MONOHYDRATE 50 MG/ML
100 INJECTION, SOLUTION INTRAVENOUS PRN
Status: DISCONTINUED | OUTPATIENT
Start: 2021-02-13 | End: 2021-02-16 | Stop reason: HOSPADM

## 2021-02-13 RX ORDER — ONDANSETRON 2 MG/ML
4 INJECTION INTRAMUSCULAR; INTRAVENOUS EVERY 6 HOURS PRN
Status: DISCONTINUED | OUTPATIENT
Start: 2021-02-13 | End: 2021-02-16 | Stop reason: HOSPADM

## 2021-02-13 RX ORDER — SODIUM CHLORIDE 9 MG/ML
INJECTION, SOLUTION INTRAVENOUS CONTINUOUS
Status: DISCONTINUED | OUTPATIENT
Start: 2021-02-13 | End: 2021-02-13

## 2021-02-13 RX ORDER — INSULIN LISPRO 100 [IU]/ML
0-3 INJECTION, SOLUTION INTRAVENOUS; SUBCUTANEOUS NIGHTLY
Status: DISCONTINUED | OUTPATIENT
Start: 2021-02-13 | End: 2021-02-16 | Stop reason: HOSPADM

## 2021-02-13 RX ORDER — INSULIN LISPRO 100 [IU]/ML
0-6 INJECTION, SOLUTION INTRAVENOUS; SUBCUTANEOUS
Status: DISCONTINUED | OUTPATIENT
Start: 2021-02-13 | End: 2021-02-16 | Stop reason: HOSPADM

## 2021-02-13 RX ORDER — SODIUM CHLORIDE 0.9 % (FLUSH) 0.9 %
10 SYRINGE (ML) INJECTION PRN
Status: DISCONTINUED | OUTPATIENT
Start: 2021-02-13 | End: 2021-02-16 | Stop reason: HOSPADM

## 2021-02-13 RX ORDER — SODIUM CHLORIDE 0.9 % (FLUSH) 0.9 %
10 SYRINGE (ML) INJECTION EVERY 12 HOURS SCHEDULED
Status: DISCONTINUED | OUTPATIENT
Start: 2021-02-13 | End: 2021-02-16 | Stop reason: HOSPADM

## 2021-02-13 RX ORDER — ROSUVASTATIN CALCIUM 20 MG/1
20 TABLET, COATED ORAL DAILY
Status: DISCONTINUED | OUTPATIENT
Start: 2021-02-13 | End: 2021-02-16 | Stop reason: HOSPADM

## 2021-02-13 RX ADMIN — APIXABAN 2.5 MG: 2.5 TABLET, FILM COATED ORAL at 21:26

## 2021-02-13 RX ADMIN — Medication 10 ML: at 08:48

## 2021-02-13 RX ADMIN — LEVOTHYROXINE SODIUM 50 MCG: 0.03 TABLET ORAL at 06:35

## 2021-02-13 RX ADMIN — ACETAMINOPHEN 650 MG: 325 TABLET ORAL at 02:44

## 2021-02-13 RX ADMIN — SODIUM CHLORIDE: 9 INJECTION, SOLUTION INTRAVENOUS at 03:36

## 2021-02-13 RX ADMIN — ROSUVASTATIN CALCIUM 20 MG: 20 TABLET, FILM COATED ORAL at 08:47

## 2021-02-13 RX ADMIN — SODIUM CHLORIDE 500 ML: 9 INJECTION, SOLUTION INTRAVENOUS at 00:49

## 2021-02-13 RX ADMIN — Medication 10 ML: at 21:27

## 2021-02-13 RX ADMIN — METOPROLOL TARTRATE 25 MG: 25 TABLET, FILM COATED ORAL at 08:47

## 2021-02-13 RX ADMIN — APIXABAN 2.5 MG: 2.5 TABLET, FILM COATED ORAL at 03:10

## 2021-02-13 RX ADMIN — METOPROLOL TARTRATE 25 MG: 25 TABLET, FILM COATED ORAL at 03:10

## 2021-02-13 RX ADMIN — PANTOPRAZOLE SODIUM 40 MG: 40 TABLET, DELAYED RELEASE ORAL at 09:48

## 2021-02-13 RX ADMIN — APIXABAN 2.5 MG: 2.5 TABLET, FILM COATED ORAL at 08:47

## 2021-02-13 RX ADMIN — ISOSORBIDE MONONITRATE 30 MG: 30 TABLET, EXTENDED RELEASE ORAL at 08:47

## 2021-02-13 RX ADMIN — METOPROLOL TARTRATE 25 MG: 25 TABLET, FILM COATED ORAL at 21:26

## 2021-02-13 ASSESSMENT — ENCOUNTER SYMPTOMS
ABDOMINAL PAIN: 0
VOMITING: 0
DIARRHEA: 0
SHORTNESS OF BREATH: 0
NAUSEA: 0

## 2021-02-13 ASSESSMENT — PAIN SCALES - GENERAL
PAINLEVEL_OUTOF10: 2
PAINLEVEL_OUTOF10: 0
PAINLEVEL_OUTOF10: 0

## 2021-02-13 ASSESSMENT — PAIN DESCRIPTION - DESCRIPTORS: DESCRIPTORS: HEADACHE

## 2021-02-13 ASSESSMENT — PAIN DESCRIPTION - LOCATION: LOCATION: HEAD

## 2021-02-13 NOTE — ED PROVIDER NOTES
Positives and Pertinent negatives as per HPI. Except as noted above in the ROS, all other systems were reviewed and negative. PAST MEDICAL HISTORY     Past Medical History:   Diagnosis Date    Bilateral cataracts 9/17/2015    Cholelithiasis     Coronary artery disease     Hyperlipidemia     Hypertension     Hypothyroid 4/11/2014    IPF (idiopathic pulmonary fibrosis) ([facility] [address] 6/12/2017    Thyroid nodule     Wears dentures     upper    Wears glasses          SURGICAL HISTORY     Past Surgical History:   Procedure Laterality Date    CHOLECYSTECTOMY, LAPAROSCOPIC  8/14/2012    MULTIPORT ROBOTIC ASSISTED LAPAROSCOPIC CHOLECYSTECTOMY WITH    CORONARY ANGIOPLASTY WITH STENT PLACEMENT  07/2003    HUMERUS FRACTURE SURGERY Right 9/27/2019    OPEN REDUCTION INTERNAL FIXATION RIGHT PROXIMAL HUMERUS WITH C-ARM performed by Jacob Jane MD at [id number] [facility], PARTIAL  11/1985    parathyroidectomy    TONSILLECTOMY      WISDOM TOOTH EXTRACTION           CURRENTMEDICATIONS       Previous Medications    ELIQUIS 2.5 MG TABS TABLET    TAKE ONE TABLET BY MOUTH TWICE A DAY    FOLIC ACID (FOLVITE) 1 MG TABLET    One BID    HANDICAP PLACARD MISC    by Does not apply route Duration: 5 years    ISOSORBIDE MONONITRATE (IMDUR) 30 MG EXTENDED RELEASE TABLET    TAKE ONE TABLET BY MOUTH DAILY    KETOCONAZOLE (NIZORAL) 2 % CREAM    Apply topically daily. LEVOTHYROXINE (SYNTHROID) 50 MCG TABLET    Take 1 tablet by mouth Daily    METOPROLOL TARTRATE (LOPRESSOR) 25 MG TABLET    TAKE ONE AND ONE-HALF TABLET BY MOUTH TWICE A DAY    NINTEDANIB ESYLATE 100 MG CAPS    Take 100 mg by mouth daily    NITROGLYCERIN (NITROSTAT) 0.4 MG SL TABLET    DISSOLVE 1 TAB UNDER TONGUE FOR CHEST PAIN - IF PAIN REMAINS AFTER 5 MIN, CALL 911 AND REPEAT DOSE. MAX 3 TABS IN 15 MINUTES    OMEPRAZOLE (PRILOSEC) 20 MG CAPSULE    Take 20 mg by mouth daily.     OXYGEN    Inhale 2 L into the lungs as needed ROSUVASTATIN (CRESTOR) 20 MG TABLET    TAKE ONE TABLET BY MOUTH ONCE NIGHTLY         ALLERGIES     Lipitor [atorvastatin]    FAMILYHISTORY       Family History   Problem Relation Age of Onset    Coronary Art Dis Mother     Stroke Mother     Other Mother         gallstones    Coronary Art Dis Father     Heart Disease Father     Prostate Cancer Brother     High Cholesterol Brother     Other Brother         gallstones    Cancer Brother         prostate    Heart Disease Brother     Other Brother         gallstones    Cancer Brother         prostate          SOCIAL HISTORY       Social History     Tobacco Use    Smoking status: Former Smoker     Quit date: 1985     Years since quittin.5    Smokeless tobacco: Never Used   Substance Use Topics    Alcohol use: Not Currently    Drug use: No       SCREENINGS             PHYSICAL EXAM    (up to 7 for level 4, 8 or more for level 5)     ED Triage Vitals [21]   BP Temp Temp Source Pulse Resp SpO2 Height Weight   133/63 96.5 °F (35.8 °C) Rectal 83 19 92 % -- --       Physical Exam  Vitals signs and nursing note reviewed. Constitutional:       General: She is not in acute distress. Appearance: She is well-developed. She is not ill-appearing, toxic-appearing or diaphoretic. HENT:      Head: Normocephalic and atraumatic. Nose: Nose normal.   Eyes:      General:         Right eye: No discharge. Left eye: No discharge. Extraocular Movements: Extraocular movements intact. Pupils: Pupils are equal, round, and reactive to light. Neck:      Musculoskeletal: Normal range of motion and neck supple. Cardiovascular:      Rate and Rhythm: Normal rate and regular rhythm. Heart sounds: Normal heart sounds. No murmur. No gallop. Pulmonary:      Effort: Pulmonary effort is normal. No respiratory distress. Breath sounds: Normal breath sounds.       Comments: Coarse breath sounds bilaterally Musculoskeletal: Normal range of motion. Skin:     General: Skin is warm and dry. Capillary Refill: Capillary refill takes less than 2 seconds. Coloration: Skin is not pale. Neurological:      Mental Status: She is alert. She is confused. GCS: GCS eye subscore is 4. GCS verbal subscore is 5. GCS motor subscore is 6.    Psychiatric:         Behavior: Behavior normal.         DIAGNOSTIC RESULTS   LABS:    Labs Reviewed   CBC WITH AUTO DIFFERENTIAL - Abnormal; Notable for the following components:       Result Value    Hemoglobin 10.9 (*)     Hematocrit 35.8 (*)     MCH 25.6 (*)     MCHC 30.5 (*)     RDW 18.3 (*)     Lymphocytes Absolute 0.9 (*)     All other components within normal limits    Narrative:     Performed at:  [facility] [address]   Phone [phone]   COMPREHENSIVE METABOLIC PANEL W/ REFLEX TO MG FOR LOW K - Abnormal; Notable for the following components:    Glucose 234 (*)     GFR Non- 42 (*)     GFR  51 (*)     ALT 9 (*)     All other components within normal limits    Narrative:     Performed at:  [facility] [address]   Phone 21  - Abnormal; Notable for the following components:    Pro-BNP 4,405 (*)     All other components within normal limits    Narrative:     Performed at:  [facility] [address],  [place]   Phone [phone]   LACTATE, SEPSIS - Abnormal; Notable for the following components:    Lactic Acid, Sepsis 3.4 (*)     All other components within normal limits    Narrative:     Performed at:  [facility] [address],  [place]   Phone [phone]   PROTIME-INR - Abnormal; Notable for the following components:    Protime 14.0 (*)     INR 1.20 (*) All other components within normal limits    Narrative:     Performed at:  [facility] [address],  [place], [place]   Phone [phone]   D-DIMER, QUANTITATIVE - Abnormal; Notable for the following components:    D-Dimer, Quant 448 (*)     All other components within normal limits    Narrative:     Performed at:  [facility] [address],  [place], [place]   Phone [phone]   RAPID INFLUENZA A/B ANTIGENS    Narrative:     Performed at:  [facility] [address],  [place], [place]   Phone [phone]   CULTURE, BLOOD 1   CULTURE, BLOOD 2   TROPONIN    Narrative:     Performed at:  [facility] [address],  [place], [place]   Phone [phone]    Narrative:     Performed at:  [facility] [address],  [email or web address]   Phone [phone]   LACTATE, SEPSIS   URINE RT REFLEX TO CULTURE       All other labs were within normal range or not returned as of this dictation. EKG: All EKG's are interpreted by the Emergency Department Physician in the absence of a cardiologist.  Please see their note for interpretation of EKG. RADIOLOGY:   Non-plain film images such as CT, Ultrasound and MRI are read by the radiologist. Plain radiographic images are visualized and preliminarily interpreted by the ED Provider with the below findings:        Interpretation per the Radiologist below, if available at the time of this note:    CT CHEST PULMONARY EMBOLISM W CONTRAST   Final Result   No evidence of pulmonary embolism. Diffuse bilateral predominantly peripheral interstitial disease with   superimposed paraseptal and minor centrilobular emphysema, stable. CT Head WO Contrast   Final Result   No acute hemorrhage or midline shift.          XR CHEST PORTABLE   Final Result Perihilar opacities. Pulmonary edema and pneumonia are in the differential.           Ct Head Wo Contrast    Result Date: 2/12/2021  EXAMINATION: CT OF THE HEAD WITHOUT CONTRAST  2/12/2021 8:31 pm TECHNIQUE: CT of the head was performed without the administration of intravenous contrast. Dose modulation, iterative reconstruction, and/or weight based adjustment of the mA/kV was utilized to reduce the radiation dose to as low as reasonably achievable. COMPARISON: CT head 11/18/2019. HISTORY: ORDERING SYSTEM PROVIDED HISTORY: syncope TECHNOLOGIST PROVIDED HISTORY: Reason for exam:->syncope Has a \"code stroke\" or \"stroke alert\" been called? ->No Decision Support Exception->Emergency Medical Condition (MA) Reason for Exam: Altered Mental Status (in by [facility] from home where she lives with a friend, apparently pt passed out at home, and then became very confused once she was awake, thinks shes here for 2nd covid vaccine. ) FINDINGS: BRAIN/VENTRICLES: No acute hemorrhage. Periventricular and subcortical hypoattenuation is nonspecific and may be related to microvascular disease. Encephalomalacia in the left frontal lobe again visualized. Raymona Freiberg white differentiation appears maintained given artifact near the skull base and through the posterior fossa. Cerebral volume loss and mild prominence of the ventricles noted. There is no midline shift. Basal cisterns appear patent. ORBITS: Visualized orbits appear unremarkable on this unenhanced exam. SINUSES: Visualized paranasal sinuses appear clear. Visualized mastoid air cells appear clear. SOFT TISSUES/SKULL:   No depressed calvarial fracture identified. No acute hemorrhage or midline shift.      Xr Chest Portable    Result Date: 2/12/2021 PROCEDURES   Unless otherwise noted below, none     Procedures    CRITICAL CARE TIME   N/A    CONSULTS:  None      EMERGENCY DEPARTMENT COURSE and DIFFERENTIAL DIAGNOSIS/MDM:   Vitals:    Vitals:    02/12/21 1955 02/12/21 2315 02/12/21 2330 02/13/21 0030   BP: 133/63 (!) 160/64 (!) 142/60 135/60   Pulse: 83 73 66 66   Resp: 19   16   Temp: 96.5 °F (35.8 °C)   97.3 °F (36.3 °C)   TempSrc: Rectal      SpO2: 92% 95%  96%       Patient was given the following medications:  Medications   0.9 % sodium chloride bolus (has no administration in time range)   iopamidol (ISOVUE-370) 76 % injection 75 mL (75 mLs Intravenous Given 2/12/21 2312) Patient presents emergency department for evaluation of syncope. Patient had a witnessed syncopal episode and when she came around she was confused. Patient is still slightly confused. She does not have any focal neurologic deficits. She is able to follow commands. She moves all 4 extremities with normal strength and coordination. Patient has coarse breath sounds bilaterally. She is on oxygen via nasal cannula. Patient does appear dehydrated with decreased skin turgor. Heart rate is regular with no murmurs or gallops. Abdomen is soft and nontender without rebound or guarding. No evidence of head trauma on examination. TMs normal bilaterally. Patient is given a liter of fluids. Glucose is 234. CT of her head shows no acute bony fracture. CT pulmonary embolism study shows no evidence of pneumonia or pulmonary embolism. Emphysematous changes are stable. Patient has no leukocytosis. Lactic acid is 3.4. On reevaluation patient is no longer confused. She is able to tell me where she is and the date. She remembers that she fell but does not remember what happened right before. Patient did have some ischemic changes on EKG but again she states she is not having any chest pain. Troponin is undetectable. Patient will be admitted for further management of her syncope, elevated lactic acid level. Hospitalist Eugene accepts the patient for admission    FINAL IMPRESSION      1. Syncope and collapse    2. Confusion    3. Elevated lactic acid level          DISPOSITION/PLAN   DISPOSITION Decision To Admit 02/13/2021 12:30:33 AM      PATIENT REFERREDTO:  No follow-up provider specified.     DISCHARGE MEDICATIONS:  New Prescriptions    No medications on file       DISCONTINUED MEDICATIONS:  Discontinued Medications    No medications on file (Please note that portions of this note were completed with a voice recognition program.  Efforts were made to edit the dictations but occasionally words are mis-transcribed.)    Ziyad Oleary PA-C (electronically signed)            Ziyad Oleary PA-C  02/13/21 1962

## 2021-02-13 NOTE — PROGRESS NOTES
4 Eyes Skin Assessment     NAME:  Aleks Bryan  YOB: 1933  MEDICAL RECORD NUMBER:  [id number]    The patient is being assess for  Admission    I agree that 2 RN's have performed a thorough Head to Toe Skin Assessment on the patient. ALL assessment sites listed below have been assessed. Areas assessed by both nurses:    Head, Face, Ears, Shoulders, Back, Chest, Arms, Elbows, Hands, Sacrum. Buttock, Coccyx, Ischium and Legs. Feet and Heels        Does the Patient have a Wound?  No noted wound(s)       George Prevention initiated:  Yes   Wound Care Orders initiated:  No    Pressure Injury (Stage 3,4, Unstageable, DTI, NWPT, and Complex wounds) if present place consult order under [de-identified] No    New and Established Ostomies if present place consult order under : No      Nurse 1 eSignature: Electronically signed by Kizzy Kay RN on 2/13/21 at 4:05 AM EST    **SHARE this note so that the co-signing nurse is able to place an eSignature**    Nurse 2 eSignature: Electronically signed by Nanette Jeffrey RN on 2/13/21 at 4:09 AM EST

## 2021-02-13 NOTE — CONSULTS
[facility]   Consult Note      Reason for Consult: Acute on chronic hypoxic respiratory failure/history of IPF  Requesting Physician: Jacob Epps    Subjective:   CHIEF COMPLAINT: Syncope     HPI: Patient apparently had a fall while walking towards the bathroom, unclear duration of loss of consciousness. Was confused when she woke up. States that she has been having on and off dizzy spells. Denies any chest pain or palpitations. History of IPF, previously treated with Ofev, normally on 2-4 L O2-follows with Dr. Luanne Crespo at [facility] [employer]  Worsening O2 requirements noted-currently on 4 L. Pulmonary consultation requested. History of atrial fibrillation on Eliquis. IPF-advanced, previously failed treatment with Ofev due to side effects, on home oxygen. Former smoker. PFT from 8/27 suggestive of severe restriction.        The patient is a 80 y.o. female with significant past medical history of:      Diagnosis Date    Arthritis     Bilateral cataracts 9/17/2015    Cerebral artery occlusion with cerebral infarction ([facility] [address]     Cholelithiasis     Coronary artery disease     Hyperlipidemia     Hypertension     Hypothyroid 4/11/2014    IPF (idiopathic pulmonary fibrosis) ([facility] [address] 6/12/2017    Thyroid nodule     Wears dentures     upper    Wears glasses         Past Surgical History:        Procedure Laterality Date    CHOLECYSTECTOMY, LAPAROSCOPIC  8/14/2012    MULTIPORT ROBOTIC ASSISTED LAPAROSCOPIC CHOLECYSTECTOMY WITH    CORONARY ANGIOPLASTY WITH STENT PLACEMENT  07/2003    HUMERUS FRACTURE SURGERY Right 9/27/2019    OPEN REDUCTION INTERNAL FIXATION RIGHT PROXIMAL HUMERUS WITH C-ARM performed by Elisha Chand MD at [address] [facility], PARTIAL  11/1985    parathyroidectomy    TONSILLECTOMY      WISDOM TOOTH EXTRACTION       Current Medications:    Current Facility-Administered Medications: apixaban (ELIQUIS) tablet 2.5 mg, 2.5 mg, Oral, BID isosorbide mononitrate (IMDUR) extended release tablet 30 mg, 30 mg, Oral, Daily  levothyroxine (SYNTHROID) tablet 50 mcg, 50 mcg, Oral, Daily  metoprolol tartrate (LOPRESSOR) tablet 25 mg, 25 mg, Oral, BID  sodium chloride flush 0.9 % injection 10 mL, 10 mL, Intravenous, 2 times per day  sodium chloride flush 0.9 % injection 10 mL, 10 mL, Intravenous, PRN  magnesium hydroxide (MILK OF MAGNESIA) 400 MG/5ML suspension 30 mL, 30 mL, Oral, Daily PRN  acetaminophen (TYLENOL) tablet 650 mg, 650 mg, Oral, Q4H PRN  pantoprazole (PROTONIX) tablet 40 mg, 40 mg, Oral, QAM AC  rosuvastatin (CRESTOR) tablet 20 mg, 20 mg, Oral, Daily  ondansetron (ZOFRAN) injection 4 mg, 4 mg, Intravenous, Q6H PRN  insulin lispro (1 Unit Dial) 0-6 Units, 0-6 Units, Subcutaneous, TID WC  insulin lispro (1 Unit Dial) 0-3 Units, 0-3 Units, Subcutaneous, Nightly  glucose (GLUTOSE) 40 % oral gel 15 g, 15 g, Oral, PRN  dextrose 50 % IV solution, 12.5 g, Intravenous, PRN  glucagon (rDNA) injection 1 mg, 1 mg, Intramuscular, PRN  dextrose 5 % solution, 100 mL/hr, Intravenous, PRN  perflutren lipid microspheres (DEFINITY) injection 1.65 mg, 1.5 mL, Intravenous, ONCE PRN    Allergies   Allergen Reactions    Lipitor [Atorvastatin] Hives       Social History:    TOBACCO:   reports that she quit smoking about 35 years ago. She has never used smokeless tobacco.  ETOH:   reports previous alcohol use.   Patient currently lives independently    Family History:       Problem Relation Age of Onset    Coronary Art Dis Mother     Stroke Mother     Other Mother         gallstones    Coronary Art Dis Father     Heart Disease Father     Prostate Cancer Brother     High Cholesterol Brother     Other Brother         gallstones    Cancer Brother         prostate    Heart Disease Brother     Other Brother         gallstones    Cancer Brother         prostate       REVIEW OF SYSTEMS:    Constitutional: negative for fatigue, fevers, malaise and weight loss Ears, nose, mouth, throat: negative for ear drainage, epistaxis, hoarseness, nasal congestion, sore throat and voice change  Respiratory: negative except for shortness of breath  Cardiovascular: negative for chest pain, chest pressure/discomfort, irregular heart beat, lower extremity edema and palpitations  Gastrointestinal: negative for abdominal pain, constipation, diarrhea, jaundice, melena, odynophagia, reflux symptoms and vomiting  Hematologic/lymphatic: negative for bleeding, easy bruising, lymphadenopathy and petechiae  Musculoskeletal:negative for arthralgias, bone pain, muscle weakness, neck pain and stiff joints  Neurological: negative for dizziness, gait problems, headaches, seizures, speech problems, tremors and weakness  Behavioral/Psych: negative for anxiety, behavior problems, depression, fatigue and sleep disturbance  Endocrine: negative for diabetic symptoms including none, neuropathy, polyphagia, polyuria, polydipsia, vomiting and diarrhea and temperature intolerance  Allergic/Immunologic: negative for anaphylaxis, angioedema, hay fever and urticaria      Objective:     Patient Vitals for the past 8 hrs:   BP Temp Temp src Pulse Resp SpO2   02/13/21 1615 (!) 148/81 97.5 °F (36.4 °C) Oral 74 18 98 %   02/13/21 1130 132/74 97.3 °F (36.3 °C) Oral 63 18 97 %   02/13/21 1129     20 94 %     I/O last 3 completed shifts:  In: -   Out: 410 [Urine:410]  No intake/output data recorded.     Physical Exam:  General Appearance: alert and oriented to person, place and time, well developed and well- nourished, in no acute distress  Skin: warm and dry, no rash or erythema  Head: normocephalic and atraumatic  Eyes: pupils equal, round, and reactive to light, extraocular eye movements intact, conjunctivae normal  ENT: external ear and ear canal normal bilaterally, nose without deformity, nasal mucosa and turbinates normal  Neck: supple and non-tender without mass, no cervical lymphadenopathy Pulmonary/Chest: rales present-bilateral  Cardiovascular: normal rate, regular rhythm,  no murmurs, rubs, distal pulses intact, no carotid bruits  Abdomen: soft, non-tender, non-distended, normal bowel sounds, no masses or organomegaly  Lymph Nodes: Cervical, supraclavicular normal  Extremities: no cyanosis, clubbing or edema  Musculoskeletal: normal range of motion, no joint swelling, deformity or tenderness  Neurologic: alert, no focal neurologic deficits    Data Review:  CBC:   Lab Results   Component Value Date    WBC 6.0 02/13/2021    RBC 4.05 02/13/2021     BMP:   Lab Results   Component Value Date    GLUCOSE 83 02/13/2021    GLUCOSE 109 04/26/2012    CO2 25 02/13/2021    BUN 19 02/13/2021    CREATININE 1.1 02/13/2021    CALCIUM 9.2 02/13/2021     ABG: No results found for: RWN0KFV, BEART, B4KWCHNO, PHART, THGBART, QLP1BKJ, PO2ART, BXR1VBI    Radiology: All pertinent images / reports were reviewed as a part of this visit. Narrative   EXAMINATION:   CTA OF THE CHEST 2/12/2021 11:06 pm       TECHNIQUE:   CTA of the chest was performed after the administration of intravenous   contrast.  Multiplanar reformatted images are provided for review.  MIP   images are provided for review.  Dose modulation, iterative reconstruction,   and/or weight based adjustment of the mA/kV was utilized to reduce the   radiation dose to as low as reasonably achievable.       COMPARISON:   08/26/2020.       HISTORY:   ORDERING SYSTEM PROVIDED HISTORY: shortness of breath, d-dimer elevated   TECHNOLOGIST PROVIDED HISTORY:   Reason for exam:->shortness of breath, d-dimer elevated   Decision Support Exception->Emergency Medical Condition (MA)   Reason for Exam: Altered Mental Status (in by [facility] from home where she   lives with a friend, apparently pt passed out at home, and then became very   confused once she was awake, thinks shes here for 2nd covid vaccine. )       FINDINGS: No new recommendations from pulmonary standpoint. Patient is overall frail, apparently as per pulmonologist note at [facility] did not want to pursue aggressive therapy/resuscitative efforts. In fact palliative care referral was also encouraged at that time. Pulmonary will sign off.     Renetta Blake MD

## 2021-02-13 NOTE — CONSULTS
[address]   Cardiac Evaluation      Patient: Ellis Ahumada  YOB: 1933         Chief Complaint   Patient presents with    Altered Mental Status     in by [address] [facility] from home where she lives with a friend, apparently pt passed out at home, and then became very confused once she was awake, thinks shes here for 2nd covid vaccine. Referring provider: Destiny Wilde MD    History of Present Illness:   79 yo WF admitted after having a syncopal episode followed by confusion. She lives with a female roommate and fell when walking to the bathroom. She was confused for awhile after coming here but is now alert. She has a very abnormal EKG which is new from a previous one but she has normal trops and no chest pain. She is anticoagulated with AF. She has ILD and is on O2 2-4 L all the time. Echo has been ordered but probably not done until Monday. She has CAD with a stent in 2003. Her LA was elevated on admit to 3.4. Past Medical History:   has a past medical history of Arthritis, Bilateral cataracts, Cerebral artery occlusion with cerebral infarction ([address] Cholelithiasis, Coronary artery disease, Hyperlipidemia, Hypertension, Hypothyroid, IPF (idiopathic pulmonary fibrosis) ([address] Thyroid nodule, Wears dentures, and Wears glasses. Surgical History:   has a past surgical history that includes Coronary angioplasty with stent (07/2003); Thyroidectomy, partial (11/1985); Cholecystectomy, laparoscopic (8/14/2012); Tonsillectomy; [place] tooth extraction; and Humerus fracture surgery (Right, 9/27/2019).      Current Facility-Administered Medications   Medication Dose Route Frequency Provider Last Rate Last Admin    apixaban (ELIQUIS) tablet 2.5 mg  2.5 mg Oral BID Lino Maitland, PA-C   2.5 mg at 02/13/21 0847    isosorbide mononitrate (IMDUR) extended release tablet 30 mg  30 mg Oral Daily Lino Maitland, PA-C   30 mg at 02/13/21 2125  levothyroxine (SYNTHROID) tablet 50 mcg  50 mcg Oral Daily Rj Urias PA-C   50 mcg at 02/13/21 0967    metoprolol tartrate (LOPRESSOR) tablet 25 mg  25 mg Oral BID Rj Urias PA-C   25 mg at 02/13/21 0847    sodium chloride flush 0.9 % injection 10 mL  10 mL Intravenous 2 times per day Rj Urias PA-C   10 mL at 02/13/21 0848    sodium chloride flush 0.9 % injection 10 mL  10 mL Intravenous PRN Rj Urias PA-C        magnesium hydroxide (MILK OF MAGNESIA) 400 MG/5ML suspension 30 mL  30 mL Oral Daily PRN Rj Urias PA-C        acetaminophen (TYLENOL) tablet 650 mg  650 mg Oral Q4H PRN Rj Urias PA-C   650 mg at 02/13/21 0244    pantoprazole (PROTONIX) tablet 40 mg  40 mg Oral QAM AC Rj Urias PA-C   40 mg at 02/13/21 2504    rosuvastatin (CRESTOR) tablet 20 mg  20 mg Oral Daily Rj Urias PA-C   20 mg at 02/13/21 0847    ondansetron (ZOFRAN) injection 4 mg  4 mg Intravenous Q6H PRN Rj Urias PA-C        insulin lispro (1 Unit Dial) 0-6 Units  0-6 Units Subcutaneous TID  Rj Urias PA-C        insulin lispro (1 Unit Dial) 0-3 Units  0-3 Units Subcutaneous Nightly Ethel Sommers PA-C        glucose (GLUTOSE) 40 % oral gel 15 g  15 g Oral PRN Rj Urias PA-C        dextrose 50 % IV solution  12.5 g Intravenous PRN Rj Urias PA-C        glucagon (rDNA) injection 1 mg  1 mg Intramuscular PRN Ethel Sommers PA-C        dextrose 5 % solution  100 mL/hr Intravenous PRN Rj Urias PA-C        perflutren lipid microspheres (DEFINITY) injection 1.65 mg  1.5 mL Intravenous ONCE PRN Brent Lugo MD           Allergies:  Lipitor [atorvastatin]     Social History:  Social History     Socioeconomic History    Marital status: Single     Spouse name: Not on file    Number of children: 0    Years of education: Not on file    Highest education level: Not on file   Occupational History  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[address] [employer] strain: Not on file    Food insecurity     Worry: Not on file     Inability: Not on file   [employer] needs     Medical: Not on file     Non-medical: Not on file   Tobacco Use    Smoking status: Former Smoker     Quit date: 1985     Years since quittin.5    Smokeless tobacco: Never Used   Substance and Sexual Activity    Alcohol use: Not Currently    Drug use: No    Sexual activity: Not Currently   Lifestyle    Physical activity     Days per week: Not on file     Minutes per session: Not on file    Stress: Not on file   Relationships    Social connections     Talks on phone: Not on file     Gets together: Not on file     Attends [employer] service: Not on file     Active member of club or organization: Not on file     Attends meetings of clubs or organizations: Not on file     Relationship status: Not on file    Intimate partner violence     Fear of current or ex partner: Not on file     Emotionally abused: Not on file     Physically abused: Not on file     Forced sexual activity: Not on file   Other Topics Concern    Not on file   Social History Narrative    Lives with room mate    Retired    Travels and golf       Family History:   Family History   Problem Relation Age of Onset    Coronary Art Dis Mother     Stroke Mother     Other Mother         gallstones    Coronary Art Dis Father     Heart Disease Father     Prostate Cancer Brother     High Cholesterol Brother     Other Brother         gallstones    Cancer Brother         prostate    Heart Disease Brother     Other Brother         gallstones    Cancer Brother         prostate     Family history has been reviewed and not pertinent except as noted above. Review of Systems:   · Constitutional: there has been no unanticipated weight loss.  No change in energy or activity level   · Eyes: No visual changes · ENT: No Headaches, hearing loss or vertigo. No mouth sores or sore throat. · Cardiovascular: Reviewed in HPI  · Respiratory: No cough or wheezing, no sputum production. · Gastrointestinal: No abdominal pain, appetite loss, blood in stools. No change in bowel or bladder habits. · Genitourinary: No nocturia, dysuria, trouble voiding  · Musculoskeletal:  No gait disturbance, weakness or joint complaints. · Integumentary: No rash or pruritis. · Neurological: No headache, change in muscle strength, numbness or tingling. No change in gait, balance, coordination, mood, affect, memory, mentation, behavior. · Psychiatric: No anxiety or depression  · Endocrine: No malaise or fever  · Hematologic/Lymphatic: No abnormal bruising or bleeding, blood clots or swollen lymph nodes. · Allergic/Immunologic: No nasal congestion or hives. Physical Examination:    Vitals:    02/13/21 0638 02/13/21 0745 02/13/21 1129 02/13/21 1130   BP: 135/75 133/69  132/74   Pulse: 68 68  63   Resp: 18 18 20 18   Temp: 97.5 °F (36.4 °C) 97.8 °F (36.6 °C)  97.3 °F (36.3 °C)   TempSrc: Oral Oral  Oral   SpO2: 91% 92% 94% 97%   Weight:       Height:         Body mass index is 19.73 kg/m². Wt Readings from Last 3 Encounters:   02/13/21 101 lb (45.8 kg)   10/30/20 107 lb (48.5 kg)   08/27/20 109 lb (49.4 kg)      BP Readings from Last 3 Encounters:   02/13/21 132/74   10/30/20 122/60   07/23/20 130/80        Physical Examination:    · CONSTITUTIONAL: thin elderly lady   · EYES: PERRLA. No xanthelasma, sclera non icteric  · EARS,NOSE,MOUTH,THROAT:  Mucous membranes moist, normal hearing  · NECK: Supple, JVP normal, thyroid not enlarged. Carotids 2+ without bruits  · RESPIRATORY: Normal effort, dry crackles throughout  · CARDIOVASCULAR: Normal PMI, regular rate and rhythm, no murmurs, rub or gallop. No edema. Radial pulses present and equal  · CHEST: No scar or masses  · ABDOMEN: Normal bowel sounds. No masses or tenderness.  No bruit · MUSCULOSKELETAL: No clubbing or cyanosis. Moves all extremities well. Normal gait  · SKIN:  Warm and dry. No rashes  · NEUROLOGIC: Cranial nerves intact. Alert and oriented  · PSYCHIATRIC: Calm affect. Appears to have normal judgement and insight        Assessment/Plan  1. Syncope and collapse - unclear reason but with her continued confusion after the even I am suspicious of another cerebral event. 2. Abnormal EKG - her EKG has diffuse T  changes suggestive of a neurologic event. Echo is pending and trops are normal. I am not sure if she is a good candidate for coronary evaluation with a cath due to her age, poor functional status and lung disease in the absence of chest pain. 3. Elevated lactic acid level    4. Anemia with  Some iron deficiency. Plan; MRI brain. Repeat EKG in the am and trops. Monitor her rhythm. AFter review of echo decide about coronary eval.               Thank you for allowing to me to participate in the care of [address].

## 2021-02-13 NOTE — PROGRESS NOTES
Pt admitted to room 3315. VSS. Pt ambulates with contact guard assist to bedside commode. A&Ox4, forgetful at times. Admission documentation, head to toe assessment, and 4 eyes assessment completed. Pt's Colonel Prince notified. Leander Rayo will bring pt's DNR paperwork today. MD notified of code status change. Pt and POA were updated on POC. Fall precautions in place, bed alarm on, nonskid foot wear applied, bed in lowest position, and call light within reach. Pt being monitored on telemetry. Medications given per MAR. Will continue to monitor.

## 2021-02-13 NOTE — H&P
[facility] Medicine History & Physical      Patient Name: Hiral Reyes    : 1933    PCP: Iliana Newton MD    Date of Service:  Patient seen and examined on 2021     Chief Complaint:  Syncope and confusion    History Of Present Illness:    Hiral Reyes is a 80 y.o. female who presented to ED with complaint of syncopal episode and confusion. Per report, patient had a syncopal episode at home and when she woke up she was grossly confused. Patient does not remember any of these events. The last thing she remembers is walking towards the bathroom. She was still somewhat confused upon arrival to ED but is currently alert and oriented x4. She denies any complaints at this time. She has a history of pulmonary fibrosis and reports she wears 2-4L O2 per NC at baseline. She denies any increased shortness of breath. She denies fever, dizziness, weakness, chest pain, cough, edema, abdominal pain, nausea, vomiting, diarrhea, constipation, urinary symptoms. Patient did hit her head and is anticoagulated on Eliquis for A. Fib. She denies neck pain, changes in vision, difficulty swallowing, numbness/tingling in extremities, focal weakness. Imaging and labs performed in ED reviewed and notable for the following: CT head and CTA chest negative for acute process. Blood glucose 234. Lactic acid 3.4. Normal anion gap. Troponin negative, proBNP 4405. No leukocytosis. Hemoglobin 10.9 down from 13.6 in 2020. Past Medical History:    Patient has a past medical history of Bilateral cataracts, Cholelithiasis, Coronary artery disease, Hyperlipidemia, Hypertension, Hypothyroid, IPF (idiopathic pulmonary fibrosis) ([address] Thyroid nodule, Wears dentures, and Wears glasses.     Past Surgical History: Patient has a past surgical history that includes Coronary angioplasty with stent (07/2003); Thyroidectomy, partial (11/1985); Cholecystectomy, laparoscopic (8/14/2012); Tonsillectomy; [place] tooth extraction; and Humerus fracture surgery (Right, 9/27/2019). Medications Prior to Admission:      Prior to Admission medications    Medication Sig Start Date End Date Taking? Authorizing Provider   ELIQUIS 2.5 MG TABS tablet TAKE ONE TABLET BY MOUTH TWICE A DAY 1/27/21   Marla Mckeon MD   rosuvastatin (CRESTOR) 20 MG tablet TAKE ONE TABLET BY MOUTH ONCE NIGHTLY 1/25/21   Dena Cleveland MD   isosorbide mononitrate (IMDUR) 30 MG extended release tablet TAKE ONE TABLET BY MOUTH DAILY 12/11/20   Marla Mckeon MD   nitroGLYCERIN (NITROSTAT) 0.4 MG SL tablet DISSOLVE 1 TAB UNDER TONGUE FOR CHEST PAIN - IF PAIN REMAINS AFTER 5 MIN, CALL 911 AND REPEAT DOSE. MAX 3 TABS IN 15 MINUTES 12/11/20   Marla Mckeon MD   levothyroxine (SYNTHROID) 50 MCG tablet Take 1 tablet by mouth Daily 8/22/20   Dena Cleveland MD   folic acid (FOLVITE) 1 MG tablet One BID 8/22/20   Dena Cleveland MD   metoprolol tartrate (LOPRESSOR) 25 MG tablet TAKE ONE AND ONE-HALF TABLET BY MOUTH TWICE A DAY 6/22/20   Marla Mckeon MD   Nintedanib Esylate 100 MG CAPS Take 100 mg by mouth daily 10/17/19   Jason Lamar MD   OXYGEN Inhale 2 L into the lungs as needed    Historical Provider, MD   Handicap Placard MISC by Does not apply route Duration: 5 years 10/1/18   Jason Lamar MD   ketoconazole (NIZORAL) 2 % cream Apply topically daily. Patient not taking: Reported on 1/4/2021 8/28/18   Dena Cleveland MD   omeprazole (PRILOSEC) 20 MG capsule Take 20 mg by mouth daily. Dena Cleveland MD       Allergies:  Lipitor [atorvastatin]    Social History:      TOBACCO:   reports that she quit smoking about 35 years ago. She has never used smokeless tobacco.  ETOH:   reports previous alcohol use. DRUGS:  reports no history of drug use. Family History:      Reviewed in detail positive as follows:        Problem Relation Age of Onset    Coronary Art Dis Mother     Stroke Mother     Other Mother         gallstones    Coronary Art Dis Father     Heart Disease Father     Prostate Cancer Brother     High Cholesterol Brother     Other Brother         gallstones    Cancer Brother         prostate    Heart Disease Brother     Other Brother         gallstones    Cancer Brother         prostate       REVIEW OF SYSTEMS:   Pertinent positives as noted in the HPI. All other systems reviewed and negative. PHYSICAL EXAM PERFORMED:    /60   Pulse 66   Temp 97.3 °F (36.3 °C)   Resp 16   SpO2 96%     General appearance:  Awake, alert, no apparent distress  HEENT:  Normocephalic, atraumatic without obvious deformity. PERRL. EOM intact. Conjunctivae/corneas clear. Neck: Supple, with full range of motion. No JVD. Trachea midline. Respiratory:  Clear to auscultation bilaterally without rales, wheezes, or rhonchi. Normal respiratory effort. Cardiovascular:  Regular rate and rhythm without murmurs, rubs or gallops. Abdomen: Soft, NT, ND, without rebound or guarding. Normal bowel sounds. Extremities:  No clubbing, cyanosis, or edema bilaterally. Full range of motion without deformity. +2 palpable pulses, equal bilaterally. Capillary refill brisk,< 3 seconds   Skin: No rashes or lesions. Warm/dry. Neurologic:  Neurovascularly intact without any focal sensory/motor deficits. Cranial nerves: II-XII intact, grossly non-focal. Alert and oriented x 3. Normal speech. Psychiatric:  Thought content appropriate, normal insight.     Labs:   CBC   Recent Labs     02/12/21 2006   WBC 5.7   HGB 10.9*   HCT 35.8*           RENAL  Recent Labs     02/12/21 2006      K 4.3      CO2 22   BUN 19   CREATININE 1.2       LFTS  Recent Labs     02/12/21 2006   AST 20   ALT 9*   BILITOT 0.3 ALKPHOS 87       COAG  Recent Labs     02/12/21 2006   INR 1.20*       CARDIAC ENZYMES  Recent Labs     02/12/21 2006   TROPONINI <0.01       LIPIDS  Cholesterol, Total   Date/Time Value Ref Range Status   06/05/2020 07:42  0 - 199 mg/dL Final     Triglycerides   Date/Time Value Ref Range Status   06/05/2020 07:42  0 - 150 mg/dL Final     HDL   Date/Time Value Ref Range Status   06/05/2020 07:42 AM 66 (H) 40 - 60 mg/dL Final   06/01/2010 07:50 AM 72 > OR = 46 mg/dL Final     LDL Calculated   Date/Time Value Ref Range Status   06/05/2020 07:42 AM 92 <100 mg/dL Final         Radiology:     CT CHEST PULMONARY EMBOLISM W CONTRAST   Final Result   No evidence of pulmonary embolism. Diffuse bilateral predominantly peripheral interstitial disease with   superimposed paraseptal and minor centrilobular emphysema, stable. CT Head WO Contrast   Final Result   No acute hemorrhage or midline shift. XR CHEST PORTABLE   Final Result   Perihilar opacities. Pulmonary edema and pneumonia are in the differential.             EKG:   Read by ED physician in the absence of a cardiologist:  \"normal sinus rhythm with a rate of 75  Axis is   Left axis deviation  QTc is  prolonged  LAFB  No inferior and anterior T wave inversions as compared to EKG on November 18, 2019. This may represent ischemia.   Patient also has new QT prolongation\"      ASSESSMENT/PLAN:    Syncope and Collapse   The etiology is unclear  CT head negative for acute process  Monitor on telemetry  Trend troponin  Check TSH, orthostatic BP and pulse, carotid doppler studies  Cardiology consulted    Acute Metabolic Encephalopathy   Etiology is unclear, but is likely related acute illness  CT head negative for acute process  UA/C&S ordered  Slowly improved in ED and now resolved    Elevated lactic acid  Likely secondary to hyperglycemia  IVF  Start SSI as above  Recheck in AM    Hyperglycemia  No documented hx of DM  Check HbA1c Accuchecks, SSI  Hypoglycemia protocol      Normocytic anemia  Check iron, N09, folic acid, occult blood    Paroxysmal A fib  Continue home eliquis and metoprolol    CAD  Continue home eliquis, BB, statin, imdur    Idiopathic pulmonary fibrosis  Supplemental O2 PRN. On 2-4L O2 at baseline  Continue home nintedanib    Hypothyroidism  Continue home synthroid  Check TSH      DVT prophylaxis: Eliquis  Probiotic if on abx: N/A    Diet: DIET GENERAL;  Code Status: Full Code    Consults:  IP CONSULT TO CARDIOLOGY    Disposition: Admit to Inpatient   ELOS: Greater than two midnights due to medical therapy     Edith Waterman PA-C    Thank you Nicholas Mcpherson MD for the opportunity to be involved in this patient's care. If you have any questions or concerns please feel free to contact me at [phone].

## 2021-02-13 NOTE — ED PROVIDER NOTES
I independently performed a history and physical on [address]. All diagnostic, treatment, and disposition decisions were made by myself in conjunction with the advanced practice provider. Briefly, this is a 80 y.o. female here for syncope and confusion. History is limited as the patient is confused and not sure why she is here. The syncopal episode occurred at home. We are uncertain if this was witnessed at the time, although she does live with a friend. Upon waking, friend noted the patient to be confused, so sent her to the emergency department for evaluation. Patient denies having any associated chest pain, shortness of breath, abdominal pain, vomiting, diarrhea, or headache. She is not having numbness, tingling, or focal weakness. On exam, patient is visibly short of breath with tachypnea and mild increased work of breathing. She has harsh rhonchi on auscultation of her lungs bilaterally with diminished air movement. She is requiring 5 L/min of oxygen via nasal cannula to maintain her O2 saturation. Speech is clear without aphasia or dysarthria. Patient is disoriented to time and situation. At this time, that she thinks she is in the hospital for a Covid vaccination. EKG #1  The Ekg interpreted by me in the absence of a cardiologist shows. normal sinus rhythm with a rate of 75  Axis is   Left axis deviation  QTc is  prolonged  LAFB  No inferior and anterior T wave inversions as compared to EKG on November 18, 2019. This may represent ischemia. Patient also has new QT prolongation    EKG #2 (obtained 50 minutes after #1)  The Ekg interpreted by me in the absence of a cardiologist shows. normal sinus rhythm with a rate of 75  Axis is   Left axis deviation  QTc is borderline prolonged  LAFB     T wave inversions have progressed as compared to previous EKG performed earlier. No ST elevation or depression to indicate infarction.   QTC has improved as compared to previous EKG    MDM Despite extensive work-up, and etiology of the patient's shortness of breath, hypoxia, and oxygen requirement is unclear at this time. CT showed no evidence of pulmonary embolism. She has diffuse interstitial disease and evidence of emphysema, so this may be secondary to an acute COPD exacerbation, although that would not explain her acute episode today. CT of her head was unremarkable. Patient met criteria for sepsis and had an elevated lactic acid, but no source of infection could be found, so antibiotics have not been ordered. Vital sign abnormalities are more likely secondary to her hypoxia and possible COPD exacerbation than sepsis. CHF is also a possibility given her elevated BNP. In any case, she will need admission for further evaluation and treatment. The total Critical Care time is 30 minutes which excludes separately billable procedures. FINAL IMPRESSION  1. Syncope and collapse    2. Confusion    3. Elevated lactic acid level    4. Acute on chronic respiratory failure with hypoxia (HCC)        Blood pressure 121/74, pulse 68, temperature 97.6 °F (36.4 °C), temperature source Oral, resp. rate 18, height 5' (1.524 m), weight 99 lb 12.8 oz (45.3 kg), SpO2 96 %, not currently breastfeeding.      For further details of Juan Carlos Flores's emergency department encounter, please see documentation by advanced practice provider, GIULIA Her.       Sylvia Jacobson MD  02/26/21 0117

## 2021-02-13 NOTE — PROGRESS NOTES
Patient admitted post midnight. Chart reviewed, orders reviewed. Patient admitted with syncope and collapse. She does not remember much regarding her symptoms at that time.   Cardiology and neurology work-ups in progress  Patient with history of IPF, with acute on chronic hypoxemic respiratory failure  We will request pulmonology consultation  Follow-up echo as I suspect worsening pulmonary hypertension  We will follow

## 2021-02-14 LAB
ANION GAP SERPL CALCULATED.3IONS-SCNC: 9 MMOL/L (ref 3–16)
BASOPHILS ABSOLUTE: 0.1 K/UL (ref 0–0.2)
BASOPHILS RELATIVE PERCENT: 0.8 %
BUN BLDV-MCNC: 15 MG/DL (ref 7–20)
CALCIUM SERPL-MCNC: 9.1 MG/DL (ref 8.3–10.6)
CHLORIDE BLD-SCNC: 104 MMOL/L (ref 99–110)
CO2: 25 MMOL/L (ref 21–32)
CREAT SERPL-MCNC: 0.9 MG/DL (ref 0.6–1.2)
EKG ATRIAL RATE: 63 BPM
EKG ATRIAL RATE: 76 BPM
EKG DIAGNOSIS: NORMAL
EKG DIAGNOSIS: NORMAL
EKG P AXIS: 19 DEGREES
EKG P AXIS: 40 DEGREES
EKG P-R INTERVAL: 164 MS
EKG P-R INTERVAL: 168 MS
EKG Q-T INTERVAL: 388 MS
EKG Q-T INTERVAL: 428 MS
EKG QRS DURATION: 82 MS
EKG QRS DURATION: 92 MS
EKG QTC CALCULATION (BAZETT): 436 MS
EKG QTC CALCULATION (BAZETT): 437 MS
EKG R AXIS: -52 DEGREES
EKG R AXIS: -54 DEGREES
EKG T AXIS: -24 DEGREES
EKG T AXIS: -28 DEGREES
EKG VENTRICULAR RATE: 63 BPM
EKG VENTRICULAR RATE: 76 BPM
EOSINOPHILS ABSOLUTE: 0.3 K/UL (ref 0–0.6)
EOSINOPHILS RELATIVE PERCENT: 3.8 %
GFR AFRICAN AMERICAN: >60
GFR NON-AFRICAN AMERICAN: 59
GLUCOSE BLD-MCNC: 100 MG/DL (ref 70–99)
GLUCOSE BLD-MCNC: 103 MG/DL (ref 70–99)
GLUCOSE BLD-MCNC: 108 MG/DL (ref 70–99)
GLUCOSE BLD-MCNC: 112 MG/DL (ref 70–99)
GLUCOSE BLD-MCNC: 85 MG/DL (ref 70–99)
HCT VFR BLD CALC: 34.4 % (ref 36–48)
HEMOGLOBIN: 10.6 G/DL (ref 12–16)
LYMPHOCYTES ABSOLUTE: 0.3 K/UL (ref 1–5.1)
LYMPHOCYTES RELATIVE PERCENT: 3.6 %
MAGNESIUM: 1.8 MG/DL (ref 1.8–2.4)
MCH RBC QN AUTO: 25.5 PG (ref 26–34)
MCHC RBC AUTO-ENTMCNC: 30.9 G/DL (ref 31–36)
MCV RBC AUTO: 82.6 FL (ref 80–100)
MONOCYTES ABSOLUTE: 0.3 K/UL (ref 0–1.3)
MONOCYTES RELATIVE PERCENT: 4.4 %
NEUTROPHILS ABSOLUTE: 6.6 K/UL (ref 1.7–7.7)
NEUTROPHILS RELATIVE PERCENT: 87.4 %
PDW BLD-RTO: 17.6 % (ref 12.4–15.4)
PERFORMED ON: ABNORMAL
PERFORMED ON: NORMAL
PHOSPHORUS: 2.9 MG/DL (ref 2.5–4.9)
PLATELET # BLD: 243 K/UL (ref 135–450)
PMV BLD AUTO: 7.5 FL (ref 5–10.5)
POTASSIUM SERPL-SCNC: 4.3 MMOL/L (ref 3.5–5.1)
RBC # BLD: 4.16 M/UL (ref 4–5.2)
SODIUM BLD-SCNC: 138 MMOL/L (ref 136–145)
TROPONIN: <0.01 NG/ML
WBC # BLD: 7.5 K/UL (ref 4–11)

## 2021-02-14 PROCEDURE — 80048 BASIC METABOLIC PNL TOTAL CA: CPT

## 2021-02-14 PROCEDURE — 1200000000 HC SEMI PRIVATE

## 2021-02-14 PROCEDURE — 84100 ASSAY OF PHOSPHORUS: CPT

## 2021-02-14 PROCEDURE — 93010 ELECTROCARDIOGRAM REPORT: CPT | Performed by: INTERNAL MEDICINE

## 2021-02-14 PROCEDURE — 93005 ELECTROCARDIOGRAM TRACING: CPT | Performed by: INTERNAL MEDICINE

## 2021-02-14 PROCEDURE — 83735 ASSAY OF MAGNESIUM: CPT

## 2021-02-14 PROCEDURE — 84484 ASSAY OF TROPONIN QUANT: CPT

## 2021-02-14 PROCEDURE — 6370000000 HC RX 637 (ALT 250 FOR IP): Performed by: PHYSICIAN ASSISTANT

## 2021-02-14 PROCEDURE — 85025 COMPLETE CBC W/AUTO DIFF WBC: CPT

## 2021-02-14 PROCEDURE — 99233 SBSQ HOSP IP/OBS HIGH 50: CPT | Performed by: INTERNAL MEDICINE

## 2021-02-14 PROCEDURE — 2580000003 HC RX 258: Performed by: PHYSICIAN ASSISTANT

## 2021-02-14 PROCEDURE — 36415 COLL VENOUS BLD VENIPUNCTURE: CPT

## 2021-02-14 RX ADMIN — APIXABAN 2.5 MG: 2.5 TABLET, FILM COATED ORAL at 20:25

## 2021-02-14 RX ADMIN — Medication 10 ML: at 20:26

## 2021-02-14 RX ADMIN — LEVOTHYROXINE SODIUM 50 MCG: 0.03 TABLET ORAL at 06:58

## 2021-02-14 RX ADMIN — ROSUVASTATIN CALCIUM 20 MG: 20 TABLET, FILM COATED ORAL at 08:46

## 2021-02-14 RX ADMIN — METOPROLOL TARTRATE 25 MG: 25 TABLET, FILM COATED ORAL at 08:47

## 2021-02-14 RX ADMIN — PANTOPRAZOLE SODIUM 40 MG: 40 TABLET, DELAYED RELEASE ORAL at 06:58

## 2021-02-14 RX ADMIN — METOPROLOL TARTRATE 25 MG: 25 TABLET, FILM COATED ORAL at 20:25

## 2021-02-14 RX ADMIN — ISOSORBIDE MONONITRATE 30 MG: 30 TABLET, EXTENDED RELEASE ORAL at 08:46

## 2021-02-14 RX ADMIN — Medication 10 ML: at 08:47

## 2021-02-14 RX ADMIN — APIXABAN 2.5 MG: 2.5 TABLET, FILM COATED ORAL at 08:46

## 2021-02-14 ASSESSMENT — PAIN SCALES - GENERAL
PAINLEVEL_OUTOF10: 0

## 2021-02-14 NOTE — PROGRESS NOTES
Cardiovascular: Regular rate and rhythm with normal S1/S2 without murmurs, rubs or gallops. Abdomen: Soft, non-tender, non-distended with normal bowel sounds. Musculoskeletal: No clubbing, cyanosis or edema bilaterally. Full range of motion without deformity. Skin: Skin color, texture, turgor normal.  No rashes or lesions. Neurologic:  Neurovascularly intact without any focal sensory/motor deficits. Cranial nerves: II-XII intact, grossly non-focal.  Psychiatric: Alert and oriented, thought content appropriate, normal insight  Capillary Refill: Brisk,< 3 seconds   Peripheral Pulses: +2 palpable, equal bilaterally       Labs:   Recent Labs     02/12/21 2006 02/13/21 0449 02/14/21  0802   WBC 5.7 6.0 7.5   HGB 10.9* 10.4* 10.6*   HCT 35.8* 33.4* 34.4*    239 243     Recent Labs     02/12/21 2006 02/13/21 0449 02/14/21  0802    138 138   K 4.3 4.4 4.3    107 104   CO2 22 25 25   BUN 19 19 15   CREATININE 1.2 1.1 0.9   CALCIUM 8.8 9.2 9.1   PHOS  --   --  2.9     Recent Labs     02/12/21 2006   AST 20   ALT 9*   BILITOT 0.3   ALKPHOS 87     Recent Labs     02/12/21 2006   INR 1.20*     Recent Labs     02/12/21 2006 02/13/21 0449 02/14/21  0802   TROPONINI <0.01 0.01 <0.01       Urinalysis:      Lab Results   Component Value Date    NITRU Negative 02/13/2021    WBCUA 2 02/13/2021    RBCUA 1 02/13/2021    BLOODU Negative 02/13/2021    SPECGRAV >1.030 02/13/2021    GLUCOSEU Negative 02/13/2021       Radiology:  VL DUP CAROTID BILATERAL   Final Result      CT CHEST PULMONARY EMBOLISM W CONTRAST   Final Result   No evidence of pulmonary embolism. Diffuse bilateral predominantly peripheral interstitial disease with   superimposed paraseptal and minor centrilobular emphysema, stable. CT Head WO Contrast   Final Result   No acute hemorrhage or midline shift. XR CHEST PORTABLE   Final Result   Perihilar opacities.   Pulmonary edema and pneumonia are in the differential. MRI BRAIN W CONTRAST    (Results Pending)           Assessment/Plan:    Active Hospital Problems    Diagnosis    Syncope and collapse [R55]     Syncope and collapse  Etiology still unclear  Appears to be more likely a neurological event  MRI pending  Seen by cardiologydeemed to be too frail for PCI  May consider chemical stress test    Acute metabolic encephalopathy POA  Patient appears to be at baseline now    Idiopathic pulmonary fibrosis  Follows with pulmonology [facility]  Pul consultpatient has advanced disease    Pulmonary arterial hypertension  Secondary to above  Follow-up echo    Elevated lactic acid  Resolved    Hyperglycemia  Improved    CAD  Being seen by cardiology  Please see plan as above    Hypothyroidism  TSH within normal limits  Continue home dose LT 4    MYLA  Iron 27  Will replace with p.o.     Borderline vitamin B12  We will supplement      DVT Prophylaxis: Lovenox  Diet: DIET GENERAL;  Code Status: Full Code    PT/OT Eval Status: Pending    Dispo -once work-up and PT OT are completed    Electronically signed by Roma Duran MD on 2/14/2021 at 2:03 PM

## 2021-02-14 NOTE — PROGRESS NOTES
[facility]   Progress Note  Cardiology    Chief Complaint   Patient presents with    Altered Mental Status     in by [address] [facility] from home where she lives with a friend, apparently pt passed out at home, and then became very confused once she was awake, thinks shes here for 2nd covid vaccine. HPI: Almost at her baseline mentally. EKG changes are improving but still abnormal in the setting of normal trops. No angina. MRI is pending. I discussed coronary evaluation with the pt and her POA roommate who is a retired psych nurse. We all agree she is frail for cath or intervention without symptoms. She will decide if she wants a chemical stress which might best be done with DB due to her severe lung disease.      Medications/Labs all Reviewed    Recent Labs     02/14/21  0802   WBC 7.5   HGB 10.6*   HCT 34.4*   MCV 82.6        Recent Labs     02/14/21  0802   CREATININE 0.9   BUN 15      K 4.3      CO2 25     Recent Labs     02/12/21 2006   INR 1.20*   PROTIME 14.0*        MEDICATIONS:    apixaban  2.5 mg Oral BID    isosorbide mononitrate  30 mg Oral Daily    levothyroxine  50 mcg Oral Daily    metoprolol tartrate  25 mg Oral BID    sodium chloride flush  10 mL Intravenous 2 times per day    pantoprazole  40 mg Oral QAM AC    rosuvastatin  20 mg Oral Daily    insulin lispro  0-6 Units Subcutaneous TID WC    insulin lispro  0-3 Units Subcutaneous Nightly      dextrose         Physical Examination:    BP (!) 145/63   Pulse 89   Temp 97.6 °F (36.4 °C) (Oral)   Resp 20   Ht 5' (1.524 m)   Wt 100 lb 12.8 oz (45.7 kg)   SpO2 95%   BMI 19.69 kg/m²     Respiratory:  · Resp Assessment: Normal respiratory effort  · Resp Auscultation: Clear to auscultation bilaterally   Cardiovascular:  · Auscultation: regular rate and rhythm, normal S1S2, no murmur, rub or gallop  · Palpation:  Nl PMI  · JVP:  normal  · Extremities: No Edema  Abdomen:  · Soft, non-tender · Normal bowel sounds  Extremities:  ·  No Cyanosis or Clubbing  Neurological/Psychiatric:  · Oriented to time, place, and person  · Non-anxious  Skin Warm and dry    Assessment:    Active Problems:    Syncope and collapse- I suspect this was a neurologic event. MRI pending. She is on low dose eliquis. Abnormal EKG without MI or angina - ?neurologic event. Stenting done of ?vessel in 2003. Pt and POA will decide if she wants a GXT. She is not a good candidate for cath due to frailty and lack of symptoms. I am not sure a GXT will change our therapy. Echo is still pending. Severe ILD - seen by Dr Fang Ferrara. PAF- has been sinus here and on eliquis.           Bob Issa MD, 2/14/2021 11:02 AM

## 2021-02-15 PROBLEM — R41.82 ALTERED MENTAL STATUS: Status: ACTIVE | Noted: 2021-02-15

## 2021-02-15 PROBLEM — R94.31 ABNORMAL ECG: Status: ACTIVE | Noted: 2021-02-15

## 2021-02-15 LAB
ANION GAP SERPL CALCULATED.3IONS-SCNC: 7 MMOL/L (ref 3–16)
BASOPHILS ABSOLUTE: 0.1 K/UL (ref 0–0.2)
BASOPHILS RELATIVE PERCENT: 0.9 %
BUN BLDV-MCNC: 16 MG/DL (ref 7–20)
CALCIUM SERPL-MCNC: 9.2 MG/DL (ref 8.3–10.6)
CHLORIDE BLD-SCNC: 103 MMOL/L (ref 99–110)
CO2: 27 MMOL/L (ref 21–32)
CREAT SERPL-MCNC: 1 MG/DL (ref 0.6–1.2)
EOSINOPHILS ABSOLUTE: 0.4 K/UL (ref 0–0.6)
EOSINOPHILS RELATIVE PERCENT: 6.4 %
GFR AFRICAN AMERICAN: >60
GFR NON-AFRICAN AMERICAN: 52
GLUCOSE BLD-MCNC: 101 MG/DL (ref 70–99)
GLUCOSE BLD-MCNC: 79 MG/DL (ref 70–99)
GLUCOSE BLD-MCNC: 83 MG/DL (ref 70–99)
GLUCOSE BLD-MCNC: 99 MG/DL (ref 70–99)
HCT VFR BLD CALC: 34.8 % (ref 36–48)
HEMOGLOBIN: 10.8 G/DL (ref 12–16)
LV EF: 65 %
LVEF MODALITY: NORMAL
LYMPHOCYTES ABSOLUTE: 0.6 K/UL (ref 1–5.1)
LYMPHOCYTES RELATIVE PERCENT: 8.8 %
MAGNESIUM: 1.8 MG/DL (ref 1.8–2.4)
MCH RBC QN AUTO: 26 PG (ref 26–34)
MCHC RBC AUTO-ENTMCNC: 31.1 G/DL (ref 31–36)
MCV RBC AUTO: 83.4 FL (ref 80–100)
MONOCYTES ABSOLUTE: 0.5 K/UL (ref 0–1.3)
MONOCYTES RELATIVE PERCENT: 7.9 %
NEUTROPHILS ABSOLUTE: 4.9 K/UL (ref 1.7–7.7)
NEUTROPHILS RELATIVE PERCENT: 76 %
PDW BLD-RTO: 17.6 % (ref 12.4–15.4)
PERFORMED ON: ABNORMAL
PERFORMED ON: NORMAL
PERFORMED ON: NORMAL
PHOSPHORUS: 2.9 MG/DL (ref 2.5–4.9)
PLATELET # BLD: 239 K/UL (ref 135–450)
PMV BLD AUTO: 7.2 FL (ref 5–10.5)
POTASSIUM SERPL-SCNC: 4 MMOL/L (ref 3.5–5.1)
RBC # BLD: 4.18 M/UL (ref 4–5.2)
SODIUM BLD-SCNC: 137 MMOL/L (ref 136–145)
WBC # BLD: 6.5 K/UL (ref 4–11)

## 2021-02-15 PROCEDURE — 83735 ASSAY OF MAGNESIUM: CPT

## 2021-02-15 PROCEDURE — 97530 THERAPEUTIC ACTIVITIES: CPT

## 2021-02-15 PROCEDURE — 99233 SBSQ HOSP IP/OBS HIGH 50: CPT | Performed by: INTERNAL MEDICINE

## 2021-02-15 PROCEDURE — 36415 COLL VENOUS BLD VENIPUNCTURE: CPT

## 2021-02-15 PROCEDURE — 85025 COMPLETE CBC W/AUTO DIFF WBC: CPT

## 2021-02-15 PROCEDURE — 97166 OT EVAL MOD COMPLEX 45 MIN: CPT

## 2021-02-15 PROCEDURE — 6370000000 HC RX 637 (ALT 250 FOR IP): Performed by: INTERNAL MEDICINE

## 2021-02-15 PROCEDURE — 80048 BASIC METABOLIC PNL TOTAL CA: CPT

## 2021-02-15 PROCEDURE — 84100 ASSAY OF PHOSPHORUS: CPT

## 2021-02-15 PROCEDURE — 2580000003 HC RX 258: Performed by: PHYSICIAN ASSISTANT

## 2021-02-15 PROCEDURE — 1200000000 HC SEMI PRIVATE

## 2021-02-15 PROCEDURE — 93306 TTE W/DOPPLER COMPLETE: CPT

## 2021-02-15 PROCEDURE — 97162 PT EVAL MOD COMPLEX 30 MIN: CPT

## 2021-02-15 PROCEDURE — 6370000000 HC RX 637 (ALT 250 FOR IP): Performed by: PHYSICIAN ASSISTANT

## 2021-02-15 PROCEDURE — 97535 SELF CARE MNGMENT TRAINING: CPT

## 2021-02-15 RX ORDER — UBIDECARENONE 75 MG
50 CAPSULE ORAL DAILY
Status: DISCONTINUED | OUTPATIENT
Start: 2021-02-15 | End: 2021-02-16 | Stop reason: HOSPADM

## 2021-02-15 RX ORDER — FERROUS SULFATE 325(65) MG
325 TABLET ORAL 2 TIMES DAILY WITH MEALS
Status: DISCONTINUED | OUTPATIENT
Start: 2021-02-15 | End: 2021-02-16 | Stop reason: HOSPADM

## 2021-02-15 RX ADMIN — Medication 50 MCG: at 13:20

## 2021-02-15 RX ADMIN — METOPROLOL TARTRATE 25 MG: 25 TABLET, FILM COATED ORAL at 20:36

## 2021-02-15 RX ADMIN — ROSUVASTATIN CALCIUM 20 MG: 20 TABLET, FILM COATED ORAL at 08:17

## 2021-02-15 RX ADMIN — APIXABAN 2.5 MG: 2.5 TABLET, FILM COATED ORAL at 20:36

## 2021-02-15 RX ADMIN — LEVOTHYROXINE SODIUM 50 MCG: 0.03 TABLET ORAL at 06:50

## 2021-02-15 RX ADMIN — Medication 10 ML: at 20:38

## 2021-02-15 RX ADMIN — APIXABAN 2.5 MG: 2.5 TABLET, FILM COATED ORAL at 08:17

## 2021-02-15 RX ADMIN — PANTOPRAZOLE SODIUM 40 MG: 40 TABLET, DELAYED RELEASE ORAL at 06:50

## 2021-02-15 RX ADMIN — FERROUS SULFATE TAB 325 MG (65 MG ELEMENTAL FE) 325 MG: 325 (65 FE) TAB at 17:06

## 2021-02-15 RX ADMIN — FERROUS SULFATE TAB 325 MG (65 MG ELEMENTAL FE) 325 MG: 325 (65 FE) TAB at 13:20

## 2021-02-15 RX ADMIN — METOPROLOL TARTRATE 25 MG: 25 TABLET, FILM COATED ORAL at 08:17

## 2021-02-15 RX ADMIN — Medication 10 ML: at 08:18

## 2021-02-15 RX ADMIN — ISOSORBIDE MONONITRATE 30 MG: 30 TABLET, EXTENDED RELEASE ORAL at 08:17

## 2021-02-15 ASSESSMENT — PAIN SCALES - GENERAL
PAINLEVEL_OUTOF10: 0

## 2021-02-15 NOTE — PROGRESS NOTES
Occupational Therapy   Occupational Therapy Initial Assessment  Date: 2/15/2021   Patient Name: Radha French  MRN: [id number]     : 1933    Date of Service: 2/15/2021    Discharge Recommendations: Radha French scored a 15/24 on the AM-PAC ADL Inpatient form. Current research shows that an AM-PAC score of 17 or less is typically not associated with a discharge to the patient's home setting. Based on the patient's AM-PAC score and their current ADL deficits, it is recommended that the patient have 3-5 sessions per week of Occupational Therapy at d/c to increase the patient's independence. Please see assessment section for further patient specific details. If patient discharges prior to next session this note will serve as a discharge summary. Please see below for the latest assessment towards goals. OT Equipment Recommendations  Equipment Needed: No    Assessment   Performance deficits / Impairments: Decreased functional mobility ; Decreased balance;Decreased ADL status; Decreased cognition;Decreased high-level IADLs;Decreased endurance;Decreased safe awareness  Assessment: Patient presents below baseline d/t above deficits; OT indicated to maximize safety/independence with ADL and IADL  Treatment Diagnosis: Above deficits associated with syncope and collapse  Prognosis: Good  Decision Making: Medium Complexity  Exam: as above  OT Education: OT Role;Plan of Care  Patient Education: eval, discharge - patient not an independent learner, requires reinforcement for carryover  REQUIRES OT FOLLOW UP: Yes  Activity Tolerance  Activity Tolerance: Treatment limited secondary to decreased cognition  Activity Tolerance: Pt on 4L/min at rest, desats to 89% s/p mobility to [facility] - bumped up to 5L/min for activity and remained in mid 90s throughout - 100% SpO2 at rest in bed on 4L/min at end of session  Safety Devices  Safety Devices in place:  Yes Type of devices: All fall risk precautions in place;Nurse notified;Call light within reach;Gait belt;Bed alarm in place; Left in bed           Patient Diagnosis(es): The primary encounter diagnosis was Syncope and collapse. Diagnoses of Confusion and Elevated lactic acid level were also pertinent to this visit. has a past medical history of Arthritis, Bilateral cataracts, Cerebral artery occlusion with cerebral infarction ([facility] [address] Cholelithiasis, Coronary artery disease, Hyperlipidemia, Hypertension, Hypothyroid, IPF (idiopathic pulmonary fibrosis) ([facility] [address] Thyroid nodule, Wears dentures, and Wears glasses. has a past surgical history that includes Coronary angioplasty with stent (07/2003); Thyroidectomy, partial (11/1985); Cholecystectomy, laparoscopic (8/14/2012); Tonsillectomy; Bryan tooth extraction; and Humerus fracture surgery (Right, 9/27/2019). Treatment Diagnosis: Above deficits associated with syncope and collapse      Restrictions  Restrictions/Precautions  Restrictions/Precautions: Fall Risk(high fall risk)  Required Braces or Orthoses?: No  Position Activity Restriction  Other position/activity restrictions: Fernanda Dunn is a 80 y.o. female patient presents emergency department for evaluation of syncopal episode and confusion. Patient had a syncopal episode at home and when she awoke she was grossly confused. Patient does seem confused at this time. She thinks she is at the hospital to receive her Covid vaccination. Patient denies any headache, chest pain, shortness of breath, abdominal pain, nausea vomiting or diarrhea. Patient does not remember falling. Patient has pulmonary fibrosis and wears oxygen at home.     Subjective   General  Chart Reviewed: Yes  Diagnosis: Syncope and collapse  Subjective  Subjective: Patient supine in bed upon arrival. Mildly confused throughout session but redirectable, agreeable to evaluation  Patient Currently in Pain: Denies  Pain Assessment Pain Assessment: 0-10  Pain Level: 0  Pre Treatment Pain Screening  Intervention List: Patient able to continue with treatment;Nurse/Physician notified  Vital Signs  Temp: 97.7 °F (36.5 °C)  Temp Source: Oral  Pulse: 71  Heart Rate Source: Monitor  Resp: 16  BP: 127/72  BP Location: Right upper arm  Patient Position: Semi fowlers  Level of Consciousness: Alert (0)  MEWS Score: 1  Patient Currently in Pain: Denies  Social/Functional History  Social/Functional History  Lives With: Friend(s)(can provide 24 hour supervision and physical assistance if needed)  Type of Home: House  Home Layout: One level  Home Access: Stairs to enter without rails, Level entry  Entrance Stairs - Number of Steps: 1 CANDY from front, level entry from garage (typically enters through garage)  Bathroom Shower/Tub: Tub/Shower unit(plans on getting grab bars and seat but \"haven't gotten around to it\")  Home Equipment: Quad cane  ADL Assistance: Needs assistance(states friend assists with ADLs, but unable to clarify what she needs help with - states she gets SOB)  Homemaking Responsibilities: No(friend performs all IADLs)  Ambulation Assistance: Independent(with [employer])  Transfer Assistance: Independent  Active : No  Patient's  Info: friend provides transportation if needed  Leisure & Hobbies: watch tv  Additional Comments: denied recent falls - reports syncopal episode resulting in current admission, states she doesn't leave the home due to COVID and SOB, states she is only able to walk short distances (across room) - limited by SOB       Objective   Vision: Impaired  Vision Exceptions: Wears glasses at all times  Hearing: Exceptions to [facility]  Hearing Exceptions: Hard of hearing/hearing concerns    Orientation  Overall Orientation Status: Within Functional Limits     Balance  Sitting Balance: Stand by assistance  Standing Balance: Contact guard assistance  Functional Mobility  Functional - Mobility Device: Rolling Walker  Activity: Other Assist Level: Minimal assistance  Functional Mobility Comments: CGA/min A for RW mgmt when taking steps from arm chair > BSC. Attempted to ambulate past BSC, requires VC for proper navigation  Toilet Transfers  Toilet - Technique: Stand step  Equipment Used: Standard bedside commode  Toilet Transfer: Contact guard assistance  ADL  Grooming: Stand by assistance;Setup(hand hygiene, oral hygiene with mouthwash)  UE Bathing: Minimal assistance  LE Bathing: Maximum assistance  UE Dressing: Moderate assistance(gown)  Toileting: Contact guard assistance  Tone RUE  RUE Tone: Normotonic  Tone LUE  LUE Tone: Normotonic     Bed mobility  Supine to Sit: Stand by assistance  Sit to Supine: Stand by assistance  Transfers  Stand Step Transfers: Contact guard assistance  Sit to stand: Contact guard assistance  Stand to sit: Contact guard assistance  Transfer Comments: EOB > arm chair > BSC > arm chair > EOB     Cognition  Overall Cognitive Status: Exceptions  Arousal/Alertness: Appropriate responses to stimuli  Following Commands: Inconsistently follows commands; Follows one step commands with repetition  Attention Span: Attends with cues to redirect; Difficulty attending to directions  Memory: Decreased short term memory  Problem Solving: Assistance required to generate solutions;Assistance required to implement solutions  Insights: Decreased awareness of deficits  Initiation: Requires cues for some  Sequencing: Requires cues for some                                        Plan   Plan  Times per week: 3-5  Times per day: Daily  Current Treatment Recommendations: Strengthening, Endurance Training, Balance Training, Functional Mobility Training, Safety Education & Training, Home Management Training, Equipment Evaluation, Education, & procurement, Self-Care / ADL, Patient/Caregiver Education & Training, Neuromuscular Re-education    G-Code     OutComes Score                                                  AM-PAC Score AM-PAC Inpatient Daily Activity Raw Score: 15 (02/15/21 1450)  AM-PAC Inpatient ADL T-Scale Score : 34.69 (02/15/21 1450)  ADL Inpatient CMS 0-100% Score: 56.46 (02/15/21 1450)  ADL Inpatient CMS G-Code Modifier : CK (02/15/21 1450)    Goals  Short term goals  Time Frame for Short term goals: discharge  Short term goal 1: UB ADL set up  Short term goal 2: LB ADL min A  Short term goal 3: Fxl transfers SBA  Short term goal 4: Toileting SBA  Short term goal 5: Fxl mob SBA  Long term goals  Time Frame for Long term goals : LTG=STG       Therapy Time   Individual Concurrent Group Co-treatment   Time In 1318         Time Out 1356         Minutes 38            Timed Code Treatment Minutes:  23 Minutes    Total Treatment Minutes:  38 minutes    Michael Kong OTR/L [id number]    Kallie Richey OT

## 2021-02-15 NOTE — PROGRESS NOTES
[facility] Progress Note      Admit Date:  2/12/2021    Chief Complaint:  Syncope, abnormal ECG    Subjective:  Ms. Turner Schirmer denies chest pain. She gets shortness of breath with minimal exertion.     Objective:   BP (!) 152/88   Pulse 80   Temp 97.8 °F (36.6 °C) (Oral)   Resp 18   Ht 5' (1.524 m)   Wt 99 lb 12.8 oz (45.3 kg)   SpO2 98%   BMI 19.49 kg/m²       Intake/Output Summary (Last 24 hours) at 2/15/2021 1131  Last data filed at 2/15/2021 0450  Gross per 24 hour   Intake 240 ml   Output 420 ml   Net -180 ml       TELEMETRY: Sinus     Physical Exam:  General:  Awake, alert, oriented x 3, NAD  Skin:  Warm and dry  Neck:  JVD 5 cm  Chest:  Diffuse crackles bilaterally  Cardiovascular:  RRR S1S2, no S3, no significant  Abdomen:  Soft, ND, NT, No HSM  Extremities:  No edema    Medications:    ferrous sulfate  325 mg Oral BID WC    vitamin B-12  50 mcg Oral Daily    apixaban  2.5 mg Oral BID    isosorbide mononitrate  30 mg Oral Daily    levothyroxine  50 mcg Oral Daily    metoprolol tartrate  25 mg Oral BID    sodium chloride flush  10 mL Intravenous 2 times per day    pantoprazole  40 mg Oral QAM AC    rosuvastatin  20 mg Oral Daily    insulin lispro  0-6 Units Subcutaneous TID WC    insulin lispro  0-3 Units Subcutaneous Nightly      dextrose       sodium chloride flush, magnesium hydroxide, acetaminophen, ondansetron, glucose, dextrose, glucagon (rDNA), dextrose, perflutren lipid microspheres    Lab Data:  CBC:   Recent Labs     02/13/21 0449 02/14/21  0802 02/15/21  0512   WBC 6.0 7.5 6.5   HGB 10.4* 10.6* 10.8*   HCT 33.4* 34.4* 34.8*   MCV 82.6 82.6 83.4    243 239     BMP:   Recent Labs     02/13/21 0449 02/14/21  0802 02/15/21  0512    138 137   K 4.4 4.3 4.0    104 103   CO2 25 25 27   PHOS  --  2.9 2.9   BUN 19 15 16   CREATININE 1.1 0.9 1.0     LIVER PROFILE:   Recent Labs     02/12/21 2006   AST 20   ALT 9*   BILITOT 0.3   ALKPHOS 87     PT/INR: Syncope and collapse  Plan: Etiology unclear. 30-day MCOT on discharge. Active Problems:    IPF (idiopathic pulmonary fibrosis) (HCC)  Plan: Severe. Per pulmonary. Abnormal ECG  Plan: New ECG changes with T wave inversion in inferior leads. Options of management discussed which include medical management versus stress testing versus cardiac catheterization. Patient with like to proceed with medical therapy. CAD in native artery  Plan: Remote. Altered mental status  Plan: Resolved. Essential hypertension  Plan: Stable. Paroxysmal atrial fibrillation (HCC)  Plan: Sinus rhythm currently. Patient wants medical therapy. She does not want to proceed with stress test or cardiac cath. I agree with Dr. Galen Moritz and the patient's decision. Continue Imdur and titrate as needed for symptom relief and as hemodynamics tolerate. Can add Ranexa if concern for angina.         Maddie Mensah MD 2/15/2021 11:31 AM

## 2021-02-15 NOTE — PROGRESS NOTES
Hospitalist Progress Note      PCP: Liz Garcia MD    Date of Admission: 2/12/2021    Chief Complaint: Syncope    Hospital Course: 80 y.o. female who presented to ED with complaint of syncopal episode and confusion. Per report, patient had a syncopal episode at home and when she woke up she was grossly confused. Patient does not remember any of these events. Cardiology and neurology investigations are underway. Right brain and echocardiogram are outstanding. Stress test is considered by cardiology, however, patient states that in all likelihood she will refuse stress testing if it is required. Subjective: Patient seen and examined. States that today she is feeling her normal self. Medications:  Reviewed    Infusion Medications    dextrose       Scheduled Medications    apixaban  2.5 mg Oral BID    isosorbide mononitrate  30 mg Oral Daily    levothyroxine  50 mcg Oral Daily    metoprolol tartrate  25 mg Oral BID    sodium chloride flush  10 mL Intravenous 2 times per day    pantoprazole  40 mg Oral QAM AC    rosuvastatin  20 mg Oral Daily    insulin lispro  0-6 Units Subcutaneous TID WC    insulin lispro  0-3 Units Subcutaneous Nightly     PRN Meds: sodium chloride flush, magnesium hydroxide, acetaminophen, ondansetron, glucose, dextrose, glucagon (rDNA), dextrose, perflutren lipid microspheres      Intake/Output Summary (Last 24 hours) at 2/15/2021 1105  Last data filed at 2/15/2021 0450  Gross per 24 hour   Intake 240 ml   Output 420 ml   Net -180 ml       Physical Exam Performed:    BP (!) 152/88   Pulse 80   Temp 97.8 °F (36.6 °C) (Oral)   Resp 18   Ht 5' (1.524 m)   Wt 99 lb 12.8 oz (45.3 kg)   SpO2 98%   BMI 19.49 kg/m²     General appearance: No apparent distress, appears stated age and cooperative. HEENT: Pupils equal, round, and reactive to light. Conjunctivae/corneas clear. CT Head WO Contrast   Final Result   No acute hemorrhage or midline shift. XR CHEST PORTABLE   Final Result   Perihilar opacities. Pulmonary edema and pneumonia are in the differential.         MRI BRAIN W CONTRAST    (Results Pending)           Assessment/Plan:    Active Hospital Problems    Diagnosis    Abnormal ECG [R94.31]     Priority: High    Altered mental status [R41.82]     Priority: Medium    Syncope and collapse [R55]    Paroxysmal atrial fibrillation (HCC) [I48.0]    IPF (idiopathic pulmonary fibrosis) ([address] [J84.112]    CAD in native artery [I25.10]    Essential hypertension [I10]     Syncope and collapse  Etiology still unclear  Appears to be more likely a neurological event  MRI pending  Seen by cardiologydeemed to be too frail for PCI  May consider chemical stress test, however, patient will likely refuse    Acute metabolic encephalopathy POA  Patient appears to be at baseline now    Idiopathic pulmonary fibrosis  Follows with pulmonology [facility]  Pul consultpatient has advanced disease    Pulmonary arterial hypertension  Secondary to above  Follow-up echo    Elevated lactic acid  Resolved    Hyperglycemia  Improved    CAD  Being seen by cardiology  Please see plan as above    Hypothyroidism  TSH within normal limits  Continue home dose LT 4    MYLA  Iron 27  Will replace with p.o.     Borderline vitamin B12  We will supplement      DVT Prophylaxis: Lovenox  Diet: DIET GENERAL;  Code Status: Full Code    PT/OT Eval Status: Pending    Dispo -once work-up and PT OT are completed    Electronically signed by Catracho Dunbar MD on 2/15/2021 at 11:05 AM

## 2021-02-15 NOTE — PROGRESS NOTES
Activity Tolerance: able to continue but needed frequent rest breaks throughout session. Some anxiety noted with mobility with increased cues to stay on task. Patient Diagnosis(es): The primary encounter diagnosis was Syncope and collapse. Diagnoses of Confusion and Elevated lactic acid level were also pertinent to this visit. has a past medical history of Arthritis, Bilateral cataracts, Cerebral artery occlusion with cerebral infarction ([facility] [address] Cholelithiasis, Coronary artery disease, Hyperlipidemia, Hypertension, Hypothyroid, IPF (idiopathic pulmonary fibrosis) ([facility] [address] Thyroid nodule, Wears dentures, and Wears glasses. has a past surgical history that includes Coronary angioplasty with stent (07/2003); Thyroidectomy, partial (11/1985); Cholecystectomy, laparoscopic (8/14/2012); Tonsillectomy; [place] tooth extraction; and Humerus fracture surgery (Right, 9/27/2019). Restrictions  Restrictions/Precautions  Restrictions/Precautions: Fall Risk(high fall risk)  Required Braces or Orthoses?: No  Position Activity Restriction  Other position/activity restrictions: Brayan Hernandez is a 80 y.o. female patient presents emergency department for evaluation of syncopal episode and confusion. Patient had a syncopal episode at home and when she awoke she was grossly confused. Patient does seem confused at this time. She thinks she is at the hospital to receive her Covid vaccination. Patient denies any headache, chest pain, shortness of breath, abdominal pain, nausea vomiting or diarrhea. Patient does not remember falling. Patient has pulmonary fibrosis and wears oxygen at home.   Vision/Hearing  Vision: Impaired  Vision Exceptions: Wears glasses at all times  Hearing: Exceptions to [facility]  Hearing Exceptions: Hard of hearing/hearing concerns     Subjective  General  Chart Reviewed: Yes  Family / Caregiver Present: No  Diagnosis: syncope and collapse  Follows Commands: Within Functional Limits Other (Comment): increased cues needed at times  General Comment  Comments: supine in bed upon arrival with alarm on  Subjective  Subjective: Denied pain, agreeable to therapy. Reports she fatigues very quickly.   Pain Screening  Patient Currently in Pain: Denies  Vital Signs  Patient Currently in Pain: Denies       Orientation  Orientation  Overall Orientation Status: Within Functional Limits  Social/Functional History  Social/Functional History  Lives With: Friend(s)(can provide 24 hour supervision and physical assistance if needed)  Type of Home: House  Home Layout: One level  Home Access: Stairs to enter without rails, Level entry  Entrance Stairs - Number of Steps: 1 CANDY from front, level entry from garage (typically enters through garage)  Bathroom Shower/Tub: Tub/Shower unit(plans on getting grab bars and seat but \"haven't gotten around to it\")  Home Equipment: Quad cane  ADL Assistance: Needs assistance(states friend assists with ADLs, but unable to clarify what she needs help with - states she gets SOB)  Homemaking Responsibilities: No(friend performs all IADLs)  Ambulation Assistance: Independent(with x[employer] [place])  Transfer Assistance: Independent  Active : No  Patient's  Info: friend provides transportation if needed  Leisure & Hobbies: watch tv  Additional Comments: denied recent falls - reports syncopal episode resulting in current admission, states she doesn't leave the home due to COVID and SOB, states she is only able to walk short distances (across room) - limited by SOB    Objective    AROM RLE (degrees)  RLE AROM: WFL  AROM LLE (degrees)  LLE AROM : WFL  Strength RLE  Strength RLE: WFL(grossly 4/5)  Strength LLE  Strength LLE: WFL(grossly 4/5)        Bed mobility  Supine to Sit: Stand by assistance  Sit to Supine: Stand by assistance  Transfers  Sit to Stand: Contact guard assistance(from bed, BSC, and chair x 2)  Stand to sit: Contact guard assistance Bed to Chair: Contact guard assistance(bed to chair, chair to bed without AD)  Ambulation 1  Surface: level tile  Device: Rolling Walker  Other Apparatus: O2(4-5L O2)  Assistance: Minimal assistance  Quality of Gait: decreased michelle, cues/assistance for walker management  Distance: 3-4' x 2 (chair <>BSC)  Comments: Patient reported SOB with short distance ambulation on 4L O2. SpO2 89% - increased to 5L O2 for further activity. Returned to 4L O2 following session with SpO2 in mid-90's. Balance  Sitting - Static: Good  Sitting - Dynamic: Good  Standing - Static: Fair  Standing - Dynamic: [facility]  Times per week: 3-5  Times per day: Daily  Current Treatment Recommendations: Strengthening, Transfer Training, Endurance Training, Balance Training, Gait Training, Functional Mobility Training, Stair training, Home Exercise Program, Safety Education & Training  Safety Devices  Type of devices: All fall risk precautions in place, Call light within reach, Bed alarm in place, Patient at risk for falls, Left in bed, Nurse notified  Restraints  Initially in place: No    AM-PAC Score  AM-PAC Inpatient Mobility Raw Score : 16 (02/15/21 1445)  AM-PAC Inpatient T-Scale Score : 40.78 (02/15/21 1445)  Mobility Inpatient CMS 0-100% Score: 54.16 (02/15/21 1445)  Mobility Inpatient CMS G-Code Modifier : CK (02/15/21 1445)          Goals  Short term goals  Time Frame for Short term goals:  To be met prior to discharge  Short term goal 1: Bed mobility with mod I  Short term goal 2: Sit to/from stand with supervision  Short term goal 3: Ambulate 48' with AAD and CGA  Patient Goals   Patient goals : did not state       Therapy Time   Individual Concurrent Group Co-treatment   Time In 1319         Time Out 1357         Minutes 38         Timed Code Treatment Minutes: 23 Minutes       Travis Blackwell PT    Thanks, Travis Blackwell, PT, DPT [id number]

## 2021-02-15 NOTE — CONSULTS
Palliative Care:       a 80 y.o. female who presented to ED with complaint of syncopal episode and confusion. Per report, patient had a syncopal episode at home and when she woke up she was grossly confused. Patient does not remember any of these events. The last thing she remembers is walking towards the bathroom. She was still somewhat confused upon arrival to ED but is currently alert and oriented x4. She denies any complaints at this time. She has a history of pulmonary fibrosis and reports she wears 2-4L O2 per NC at baseline. She denies any increased shortness of breath. She denies fever, dizziness, weakness, chest pain, cough, edema, abdominal pain, nausea, vomiting, diarrhea, constipation, urinary symptoms. Patient did hit her head and is anticoagulated on Eliquis for A. Fib. She denies neck pain, changes in vision, difficulty swallowing, numbness/tingling in extremities, focal weakness. Palliative consult ordered 2/14/21. Past Medical History:   has a past medical history of Arthritis, Bilateral cataracts, Cerebral artery occlusion with cerebral infarction ([address] Cholelithiasis, Coronary artery disease, Hyperlipidemia, Hypertension, Hypothyroid, IPF (idiopathic pulmonary fibrosis) ([address] Thyroid nodule, Wears dentures, and Wears glasses. Past Surgical History:   has a past surgical history that includes Coronary angioplasty with stent (07/2003); Thyroidectomy, partial (11/1985); Cholecystectomy, laparoscopic (8/14/2012); Tonsillectomy; [place] tooth extraction; and Humerus fracture surgery (Right, 9/27/2019). Advance Directives:   Full Code       Problem Severity: Pain/Other Symptoms:       Denies. Bed Mobility/Toileting/Transfer:  Stand by assist. Fall risk as she fell at home syncope event.          Performance Status:        Palliative Performance Scale:  100% []Full Normal activity & work No evidence of disease  90%   [] Full Normal activity & work Some evidence of disease 80%   [] Full Normal activity with Effort Some evidence of disease  70%   [] Reduced Unable Normal Job/Work Significant disease Full Normal or reduced  60%   [] Ambulation reduced; Significant disease; Can't do hobbies/housework; intake normal   or reduced; occasional assist; LOC full/confusion  50%   [x] Mainly sit/lie; Extensive disease; Can't do any work; Considerable assist; intake normal  Or reduced; LOC full/confusion  40%   [] Mainly in bed; Extensive disease; Mainly assist; intake normal or reduced; occasional assist; LOC full/confusion  30%   [] Bed Bound; Extensive disease; Total care; intake reduced; LOC full/confusion  20%   [] Bed Bound; Extensive disease; Total care; intake minimal; Drowsy/coma  10%   [] Bed Bound; Extensive disease; Total care; Mouth care only; Drowsy/coma    PPS 50%    Symptom Assessment: Appetite/Nausea/Bowels/Fatigue:  Appetite poor. WT 99 lbs. Encourage intake of protein. Albumin 3.4     Social History:   reports that she quit smoking about 35 years ago. She has never used smokeless tobacco. She reports previous alcohol use. She reports that she does not use drugs. Family History:  family history includes Cancer in her brother and brother; Coronary Art Dis in her father and mother; Heart Disease in her brother and father; High Cholesterol in her brother; Other in her brother, brother, and mother; Prostate Cancer in her brother; Stroke in her mother. Psychological/Spiritual:   Single. Has friend that lives with her. [employer] non-Temple.         Family Discussion:      Patient A/O X3. Completed ECHOcardiogram today 65%. Currently requiring 4L 02 sats at 100%. Discussed Advance Directives. ACP in place. Wishes are to remain full code status. Discussed Quaker. Denies offer for  support. Patient hopes to gain strength and return to home setting. Open for HC. Will continue to follow and offer any educational/support as needed.

## 2021-02-15 NOTE — PROGRESS NOTES
Physical/Occupational Therapy  Jessica Flores    Attempted to see pt for PT/OT evaluation. Patient currently off the floor for testing. Will hold therapy at this time and will follow up with pt as schedule permits.  Thanks, Vadim Rangel, PT, DPT [id number], Eduarda Lockett, MOT OTR/L [id number]

## 2021-02-16 ENCOUNTER — APPOINTMENT (OUTPATIENT)
Dept: MRI IMAGING | Age: 86
DRG: 064 | End: 2021-02-16
Payer: MEDICARE

## 2021-02-16 VITALS
RESPIRATION RATE: 18 BRPM | SYSTOLIC BLOOD PRESSURE: 121 MMHG | DIASTOLIC BLOOD PRESSURE: 74 MMHG | HEART RATE: 68 BPM | BODY MASS INDEX: 19.59 KG/M2 | OXYGEN SATURATION: 96 % | HEIGHT: 60 IN | TEMPERATURE: 97.6 F | WEIGHT: 99.8 LBS

## 2021-02-16 DIAGNOSIS — I48.0 PAF (PAROXYSMAL ATRIAL FIBRILLATION) (HCC): Primary | ICD-10-CM

## 2021-02-16 DIAGNOSIS — R94.31 ABNORMAL EKG: ICD-10-CM

## 2021-02-16 DIAGNOSIS — I25.10 CAD IN NATIVE ARTERY: ICD-10-CM

## 2021-02-16 LAB
ANION GAP SERPL CALCULATED.3IONS-SCNC: 7 MMOL/L (ref 3–16)
BASOPHILS ABSOLUTE: 0 K/UL (ref 0–0.2)
BASOPHILS RELATIVE PERCENT: 0.9 %
BLOOD CULTURE, ROUTINE: NORMAL
BUN BLDV-MCNC: 14 MG/DL (ref 7–20)
CALCIUM SERPL-MCNC: 9.3 MG/DL (ref 8.3–10.6)
CHLORIDE BLD-SCNC: 102 MMOL/L (ref 99–110)
CO2: 28 MMOL/L (ref 21–32)
CREAT SERPL-MCNC: 1 MG/DL (ref 0.6–1.2)
CULTURE, BLOOD 2: NORMAL
EOSINOPHILS ABSOLUTE: 0.3 K/UL (ref 0–0.6)
EOSINOPHILS RELATIVE PERCENT: 4.9 %
GFR AFRICAN AMERICAN: >60
GFR NON-AFRICAN AMERICAN: 52
GLUCOSE BLD-MCNC: 101 MG/DL (ref 70–99)
GLUCOSE BLD-MCNC: 76 MG/DL (ref 70–99)
GLUCOSE BLD-MCNC: 83 MG/DL (ref 70–99)
HCT VFR BLD CALC: 34.6 % (ref 36–48)
HEMOGLOBIN: 10.8 G/DL (ref 12–16)
LYMPHOCYTES ABSOLUTE: 0.5 K/UL (ref 1–5.1)
LYMPHOCYTES RELATIVE PERCENT: 8.6 %
MAGNESIUM: 1.9 MG/DL (ref 1.8–2.4)
MCH RBC QN AUTO: 25.7 PG (ref 26–34)
MCHC RBC AUTO-ENTMCNC: 31.1 G/DL (ref 31–36)
MCV RBC AUTO: 82.4 FL (ref 80–100)
MONOCYTES ABSOLUTE: 0.4 K/UL (ref 0–1.3)
MONOCYTES RELATIVE PERCENT: 6.7 %
NEUTROPHILS ABSOLUTE: 4.4 K/UL (ref 1.7–7.7)
NEUTROPHILS RELATIVE PERCENT: 78.9 %
PDW BLD-RTO: 17.2 % (ref 12.4–15.4)
PERFORMED ON: ABNORMAL
PERFORMED ON: NORMAL
PHOSPHORUS: 3 MG/DL (ref 2.5–4.9)
PLATELET # BLD: 227 K/UL (ref 135–450)
PMV BLD AUTO: 7.2 FL (ref 5–10.5)
POTASSIUM SERPL-SCNC: 4.1 MMOL/L (ref 3.5–5.1)
RBC # BLD: 4.19 M/UL (ref 4–5.2)
SODIUM BLD-SCNC: 137 MMOL/L (ref 136–145)
WBC # BLD: 5.6 K/UL (ref 4–11)

## 2021-02-16 PROCEDURE — 80048 BASIC METABOLIC PNL TOTAL CA: CPT

## 2021-02-16 PROCEDURE — 85025 COMPLETE CBC W/AUTO DIFF WBC: CPT

## 2021-02-16 PROCEDURE — 97530 THERAPEUTIC ACTIVITIES: CPT

## 2021-02-16 PROCEDURE — 83735 ASSAY OF MAGNESIUM: CPT

## 2021-02-16 PROCEDURE — 99233 SBSQ HOSP IP/OBS HIGH 50: CPT | Performed by: NURSE PRACTITIONER

## 2021-02-16 PROCEDURE — 6370000000 HC RX 637 (ALT 250 FOR IP): Performed by: PHYSICIAN ASSISTANT

## 2021-02-16 PROCEDURE — 6370000000 HC RX 637 (ALT 250 FOR IP): Performed by: INTERNAL MEDICINE

## 2021-02-16 PROCEDURE — 99223 1ST HOSP IP/OBS HIGH 75: CPT | Performed by: PSYCHIATRY & NEUROLOGY

## 2021-02-16 PROCEDURE — 97116 GAIT TRAINING THERAPY: CPT

## 2021-02-16 PROCEDURE — 2580000003 HC RX 258: Performed by: PHYSICIAN ASSISTANT

## 2021-02-16 PROCEDURE — 36415 COLL VENOUS BLD VENIPUNCTURE: CPT

## 2021-02-16 PROCEDURE — 70551 MRI BRAIN STEM W/O DYE: CPT

## 2021-02-16 PROCEDURE — 84100 ASSAY OF PHOSPHORUS: CPT

## 2021-02-16 RX ORDER — METOPROLOL TARTRATE 50 MG/1
50 TABLET, FILM COATED ORAL 2 TIMES DAILY
Status: DISCONTINUED | OUTPATIENT
Start: 2021-02-16 | End: 2021-02-16 | Stop reason: HOSPADM

## 2021-02-16 RX ORDER — FERROUS SULFATE 325(65) MG
325 TABLET ORAL 2 TIMES DAILY WITH MEALS
Qty: 30 TABLET | Refills: 3 | Status: SHIPPED | OUTPATIENT
Start: 2021-02-16

## 2021-02-16 RX ADMIN — ISOSORBIDE MONONITRATE 30 MG: 30 TABLET, EXTENDED RELEASE ORAL at 09:39

## 2021-02-16 RX ADMIN — METOPROLOL TARTRATE 25 MG: 25 TABLET, FILM COATED ORAL at 09:38

## 2021-02-16 RX ADMIN — LEVOTHYROXINE SODIUM 50 MCG: 0.03 TABLET ORAL at 06:10

## 2021-02-16 RX ADMIN — FERROUS SULFATE TAB 325 MG (65 MG ELEMENTAL FE) 325 MG: 325 (65 FE) TAB at 09:38

## 2021-02-16 RX ADMIN — PANTOPRAZOLE SODIUM 40 MG: 40 TABLET, DELAYED RELEASE ORAL at 06:10

## 2021-02-16 RX ADMIN — Medication 50 MCG: at 09:38

## 2021-02-16 RX ADMIN — APIXABAN 2.5 MG: 2.5 TABLET, FILM COATED ORAL at 09:38

## 2021-02-16 RX ADMIN — Medication 10 ML: at 09:39

## 2021-02-16 RX ADMIN — ROSUVASTATIN CALCIUM 20 MG: 20 TABLET, FILM COATED ORAL at 09:38

## 2021-02-16 NOTE — CONSULTS
Past Surgical History:   Procedure Laterality Date    CHOLECYSTECTOMY, LAPAROSCOPIC  2012    MULTIPORT ROBOTIC ASSISTED LAPAROSCOPIC CHOLECYSTECTOMY WITH    CORONARY ANGIOPLASTY WITH STENT PLACEMENT  2003    HUMERUS FRACTURE SURGERY Right 2019    OPEN REDUCTION INTERNAL FIXATION RIGHT PROXIMAL HUMERUS WITH C-ARM performed by Quirino Major MD at [address], PARTIAL  1985    parathyroidectomy    TONSILLECTOMY      WISDOM TOOTH EXTRACTION          Past Medical History:   Diagnosis Date    Arthritis     Bilateral cataracts 2015    Cerebral artery occlusion with [facility] infarction ([facility] [address]     Cholelithiasis     Coronary artery disease     Hyperlipidemia     Hypertension     Hypothyroid 2014    IPF (idiopathic pulmonary fibrosis) ([facility] [address] 2017    Thyroid nodule     Wears dentures     upper    Wears glasses      Social History     Tobacco Use    Smoking status: Former Smoker     Quit date: 1985     Years since quittin.5    Smokeless tobacco: Never Used   Substance Use Topics    Alcohol use: Not Currently    Drug use: No     Allergies   Allergen Reactions    Lipitor [Atorvastatin] Hives     Current Facility-Administered Medications   Medication Dose Route Frequency Provider Last Rate Last Admin    metoprolol tartrate (LOPRESSOR) tablet 50 mg  50 mg Oral BID Raeford Alpers, APRN - CNP        ferrous sulfate (IRON 325) tablet 325 mg  325 mg Oral BID WC Fabio LYONS MD   325 mg at 21 0938    vitamin B-12 (CYANOCOBALAMIN) tablet 50 mcg  50 mcg Oral Daily Fabio LYONS MD   50 mcg at 21 4994    apixaban (ELIQUIS) tablet 2.5 mg  2.5 mg Oral BID Damaris Rosales PA-C   2.5 mg at 21 4909    isosorbide mononitrate (IMDUR) extended release tablet 30 mg  30 mg Oral Daily Damaris Rosales PA-C   30 mg at 21 8341  levothyroxine (SYNTHROID) tablet 50 mcg  50 mcg Oral Daily JULIO ChaudharyC   50 mcg at 02/16/21 0610    sodium chloride flush 0.9 % injection 10 mL  10 mL Intravenous 2 times per day GIULIA Chaudhary-C   10 mL at 02/16/21 6107    sodium chloride flush 0.9 % injection 10 mL  10 mL Intravenous PRN Lino Damico PA-C        magnesium hydroxide (MILK OF MAGNESIA) 400 MG/5ML suspension 30 mL  30 mL Oral Daily PRN JULIO ChaudharyC        acetaminophen (TYLENOL) tablet 650 mg  650 mg Oral Q4H PRN JULIO ChaudharyC   650 mg at 02/13/21 0244    pantoprazole (PROTONIX) tablet 40 mg  40 mg Oral QAM AC JULIO ChaudharyC   40 mg at 02/16/21 0610    rosuvastatin (CRESTOR) tablet 20 mg  20 mg Oral Daily GIULIA Chaudhary-C   20 mg at 02/16/21 6933    ondansetron (ZOFRAN) injection 4 mg  4 mg Intravenous Q6H PRN Lino Damico PA-C        insulin lispro (1 Unit Dial) 0-6 Units  0-6 Units Subcutaneous TID WC GIULIA Chaudhary-AV        insulin lispro (1 Unit Dial) 0-3 Units  0-3 Units Subcutaneous Nightly Ethel Sommers PA-C        glucose (GLUTOSE) 40 % oral gel 15 g  15 g Oral PRN Lino Damico PA-C        dextrose 50 % IV solution  12.5 g Intravenous PRN Lino Damico PA-C        glucagon (rDNA) injection 1 mg  1 mg Intramuscular PRN Ethel Sommers PA-C        dextrose 5 % solution  100 mL/hr Intravenous PRN Lino Damico PA-C        perflutren lipid microspheres (DEFINITY) injection 1.65 mg  1.5 mL Intravenous ONCE PRN Radha Tran MD           ROS : A 10-14 system review of constitutional, cardiovascular, respiratory, eyes, musculoskeletal, endocrine, GI, ENT, skin, hematological, genitourinary, psychiatric and neurologic systems was obtained and updated today and is unremarkable except as mentioned in my HPI      Exam:     Constitutional:   Vitals:    02/15/21 1745 02/15/21 1930 02/16/21 0045 02/16/21 0930 BP: 131/79 (!) 151/71 131/76 133/76   Pulse: 75 75 74 72   Resp: 16 18 18 18   Temp: 97.9 °F (36.6 °C) 97.9 °F (36.6 °C) 97.5 °F (36.4 °C) 97.6 °F (36.4 °C)   TempSrc: Oral Oral Oral Oral   SpO2: 100% 94% 96% 99%   Weight:       Height:           General appearance:  Normal development and appear in no acute distress. Eye: No icterus. Fundus: Funduscopic examination cannot be performed due to COVID19 restrictions and precautions. Neck: supple  Cardiovascular: No lower leg edema with good pulsation. Mental Status:   AAO x3 today  Unaware of recent event  Await remote event  Poor immediate recall  Intact remote memory  Poor fund of knowledge  Language is soft and fluent  Good attention  Cranial Nerves:   II: Visual fields: Full. Pupils: equal, round, reactive to light  III,IV,VI: Extra Ocular Movements are intact. No nystagmus  V: Facial sensation is intact   VII: Facial strength and movements: intact and symmetric  VIII: Hearing: Intact  IX: Palate elevation is symmetric  XI: Shoulder shrug is intact  XII: Tongue movements are normal  Musculoskeletal: Generalized diffuse weakness with poor effort. No focal weakness today. Tone: Normal tone. Reflexes: 2+ in the upper extremity and 1 in the lower extremity   Planters: flexor bilaterally. Coordination: no pronator drift, no dysmetria with FNF in upper extremities. Normal REM. Sensation: normal to all modalities in both arms and legs. Gait/Posture: Not tested due to poor cooperation.      Data:  LABS:   Lab Results   Component Value Date     02/16/2021    K 4.1 02/16/2021    K 4.4 02/13/2021     02/16/2021    CO2 28 02/16/2021    BUN 14 02/16/2021    CREATININE 1.0 02/16/2021    GFRAA >60 02/16/2021    GFRAA 51 08/10/2012    LABGLOM 52 02/16/2021    LABGLOM 41 06/12/2018    GLUCOSE 83 02/16/2021    GLUCOSE 109 04/26/2012    PHOS 3.0 02/16/2021    MG 1.90 02/16/2021    CALCIUM 9.3 02/16/2021     Lab Results   Component Value Date WBC 5.6 02/16/2021    RBC 4.19 02/16/2021    HGB 10.8 02/16/2021    HCT 34.6 02/16/2021    MCV 82.4 02/16/2021    RDW 17.2 02/16/2021     02/16/2021     Lab Results   Component Value Date    INR 1.20 (H) 02/12/2021    PROTIME 14.0 (H) 02/12/2021       Neuroimaging were independently reviewed by me and discussed results with the patient  Reviewed notes from different physicians  Reviewed lab and blood testing    Impression:  New onset syncope and encephalopathy. So far no clear etiology. Rule out cardiac arrhythmia, vasovagal or idiopathic. New acute right frontal stroke which could be cardioembolic versus thromboembolic event. On Eliquis. No need to change DOAC at this point  PAF on Eliquis  Hypertension  Hyperlipidemia  CAD. Diabetes, controlled. A1c 6.4    Recommendation:  MRI results discussed with the patient  Agree with event monitor giving high risk of falling with her syncope on blood thinners  Continue Eliquis  PT and OT  Telemetry  PPI  Continue home blood pressure medications  Statin and follow lipid panel   Continue Synthroid  Insulin sliding scale  Blood sugar control  Long discussion with the patient regarding stroke prevention, risk of falling and risk of worsening memory with recurrent strokes  Advised the patient to monitor blood pressure at home and to use her walker at all time  Can be discharged  When medically stable  No further recommendation  Please call for questions  MDM: High complexity due to recent syncope, new ischemic stroke and risk of stroke recurrence, falling and bleeding risk on blood thinners. Thank you for referring such patient. If you have any questions regarding my consult note, please don't hesitate to call me. Jamarcus Ludwig MD  [phone]    This dictation was generated by voice recognition computer software.  Although all attempts are made to edit the dictation for accuracy, there may be errors in the  transcription that are not intended

## 2021-02-16 NOTE — CARE COORDINATION
Discharge Planning Assessment  RN discharge planner met with patient and family member-  Friend- POA  to discuss reason for admission, current living situation, and potential needs at the time of discharge    Demographics/Insurance verified Yes    Current type of dwelling:  Private home  Has stairs and railing to enter    Patient from ECF/SW confirmed with:  N/A    Living arrangements:  Lives a friend  - Filiberto Chatterjee  Who is the POA    Level of function/Support: Needs assistance with ADLs    PCP:  Dr Connor Richey     Last Visit to PCP:  2 months ago    DME:  Cane, shower chair, home oxygen- from [employer]  And also bought  a portable concentrator . Active with any community resources/agencies/skilled home care:  Has had [facility] before  Active with [facility]  For palliative care    Medication compliance issues:  Denies    Financial issues that could impact healthcare:  No        Tentative discharge plan:  Home with hhc services    And rolling walker as recommended by therapy    Discussed and provided facilities of choice if transition to a skilled nursing facility is required at the time of discharge      Discussed with patient and/or family that on the day of discharge home tentative time of discharge will be between 10 AM and noon.     Transportation at the time of discharge: Friend

## 2021-02-16 NOTE — DISCHARGE INSTR - COC
Continuity of Care Form    Patient Name: Susie Xavier   :  1933  MRN:  [id number]    Admit date:  2021  Discharge date:  21    Code Status Order: Full Code   Advance Directives:   [facility] Documentation       Date/Time Healthcare Directive Type of Healthcare Directive Copy in [address] Agent's Name Healthcare Agent's Phone Number    [phone]  Yes, patient has an advance directive for healthcare treatment    No, copy requested from family  Andrea Physician:  Krista Olszewski, MD  PCP: Marcheta Soulier, MD    Discharging Nurse: [facility] Unit/Room#: 3AN-3315/3315-01  Discharging Unit Phone Number: [phone]    Emergency Contact:   Extended Emergency Contact Information  Primary Emergency Contact: Ruther Babinski [address] Phone: [phone]  Mobile Phone: [phone]  Relation: Other    Past Surgical History:  Past Surgical History:   Procedure Laterality Date    CHOLECYSTECTOMY, LAPAROSCOPIC  2012    MULTIPORT ROBOTIC ASSISTED LAPAROSCOPIC CHOLECYSTECTOMY WITH    CORONARY ANGIOPLASTY WITH STENT PLACEMENT  2003    HUMERUS FRACTURE SURGERY Right 2019    OPEN REDUCTION INTERNAL FIXATION RIGHT PROXIMAL HUMERUS WITH C-ARM performed by Suly Vera MD at [address] [facility], PARTIAL  1985    parathyroidectomy    TONSILLECTOMY      WISDOM TOOTH EXTRACTION         Immunization History:   Immunization History   Administered Date(s) Administered    Influenza A ([id number]) Vaccine IM 2009    Influenza Vaccine, unspecified formulation 10/24/2016    Influenza, High Dose (Fluzone 65 yrs and older) 10/02/2017, 10/31/2018    Influenza, Triv, inactivated, subunit, adjuvanted, IM (Fluad 65 yrs and older) 10/24/2019    Pneumococcal Conjugate 13-valent ([id number]) 2015    Pneumococcal Polysaccharide ([id number]) 2004  Tdap (Boostrix, Adacel) 06/08/2018    Zoster Recombinant (Shingrix) 06/14/2018, 02/05/2019       Active Problems:  Patient Active Problem List   Diagnosis Code    CAD in native artery I25.10    Hyperlipidemia E78.5    Essential hypertension I10    Thyroid nodule E04.1    Tympanic membrane perforation H72.90    Coronary atherosclerosis I25.10    GERD (gastroesophageal reflux disease) K21.9    Pemphigoid L12.9    Hypothyroidism E03.9    Stage 3 chronic kidney disease N18.30    OA (osteoarthritis) of knee M17.10    IPF (idiopathic pulmonary fibrosis) ([facility]) J84.112    Hypoxemia R09.02    Itching L29.9    Tinea corporis B35.4    Closed fracture of right proximal humerus S42.201A    Stroke, lacunar ([facility]) I63.81    Arterial ischemic stroke, ICA, right, acute ([facility]) I63.231    HTN (hypertension), benign I10    Dyslipidemia E78.5    Asymptomatic stenosis of left vertebral artery I65.02    PAF (paroxysmal atrial fibrillation) ([facility]) I48.0    Diarrhea due to malabsorption K90.9, R19.7    Syncope and collapse R55    Abnormal ECG R94.31    Altered mental status R41.82    DM type 2, controlled, with complication ([facility] [address] B03.7       Isolation/Infection:   Isolation            No Isolation          Patient Infection Status       Infection Onset Added Last Indicated Last Indicated By Review Planned Expiration Resolved Resolved By    None active    Resolved    COVID-19 Rule Out 02/12/21 02/12/21 02/12/21 COVID-19 (Ordered)   02/12/21 Rule-Out Test Resulted            Nurse Assessment:  Last Vital Signs: /74   Pulse 68   Temp 97.6 °F (36.4 °C) (Oral)   Resp 18   Ht 5' (1.524 m)   Wt 99 lb 12.8 oz (45.3 kg)   SpO2 96%   BMI 19.49 kg/m²     Last documented pain score (0-10 scale): Pain Level: 0  Last Weight:   Wt Readings from Last 1 Encounters:   02/15/21 99 lb 12.8 oz (45.3 kg)     Mental Status:  alert, coherent, logical, thought processes intact and able to concentrate and follow conversation IV Access:  - None    Nursing Mobility/ADLs:  Walking   Assisted  Transfer  Assisted  Bathing  Assisted  Dressing  Assisted  Toileting  Assisted  Feeding  Independent  Med Admin  Assisted  Med Delivery   whole    Wound Care Documentation and Therapy:  Incision 08/14/12 Abdomen  (Active)   Number of days: 3108        Elimination:  Continence:   · Bowel: Yes  · Bladder: Yes  Urinary Catheter: None   Colostomy/Ileostomy/Ileal Conduit: No       Date of Last BM: 2/14  No intake or output data in the 24 hours ending 02/16/21 1346  No intake/output data recorded. Safety Concerns: At Risk for Falls    Impairments/Disabilities:      None    Nutrition Therapy:  Current Nutrition Therapy:   - Oral Diet:  Cardiac    Routes of Feeding: Oral  Liquids: No Restrictions  Daily Fluid Restriction: no  Last Modified Barium Swallow with Video (Video Swallowing Test): not done    Treatments at the Time of Hospital Discharge:   Respiratory Treatments: 2-4L Nasal Cannula  Oxygen Therapy:  is on oxygen at 2-4 L/min per nasal cannula.   Ventilator:    - No ventilator support    Rehab Therapies: sn,pt,ot  Weight Bearing Status/Restrictions: No weight bearing restirctions  Other Medical Equipment (for information only, NOT a DME order):  cane, walker and bedside commode  Other Treatments: n/a    Patient's personal belongings (please select all that are sent with patient):  Estephania    RN SIGNATURE:  Electronically signed by Nimesh Truong RN on 2/16/21 at 3:28 PM EST    CASE MANAGEMENT/SOCIAL WORK SECTION    Inpatient Status Date:  2/13/21    Readmission Risk Assessment Score:  Readmission Risk              Risk of Unplanned Readmission:        13           Discharging to Facility/ [address] [place]       Phone -[phone]               Dialysis Facility (if applicable)   · Name:  · Address:  · Dialysis Schedule:  · Phone:  · Fax: / signature: Electronically signed by Christina Vang RN on 2/16/21 at 3:30 PM EST    PHYSICIAN SECTION    Prognosis: Good    Condition at Discharge: Stable    Rehab Potential (if transferring to Rehab): Good    Recommended Labs or Other Treatments After Discharge:     Physician Certification: I certify the above information and transfer of Suki Ingram  is necessary for the continuing treatment of the diagnosis listed and that she requires Home Care for greater 30 days.      Update Admission H&P: No change in H&P    PHYSICIAN SIGNATURE:  Electronically signed by Rd Jaeger MD on 2/16/21 at 1:46 PM EST

## 2021-02-16 NOTE — PROGRESS NOTES
Physical Therapy  Facility/Department: [address] NURSING  Daily Treatment Note  NAME: Sasha Yates  : 1933  MRN: [id number]    Date of Service: 2021  Sasha Yates scored a 18/24 on the AM-PAC short mobility form. Current research shows that an AM-PAC score of 18 or greater is typically associated with a discharge to the patient's home setting. Based on the patient's AM-PAC score and their current functional mobility deficits, it is recommended that the patient have 2-3 sessions per week of Physical Therapy at d/c to increase the patient's independence. At this time, this patient demonstrates the endurance and safety to discharge home with Home PT and a follow up treatment frequency of 2-3x/wk. Please see assessment section for further patient specific details. If patient discharges prior to next session this note will serve as a discharge summary. Please see below for the latest assessment towards goals. Discharge Recommendations:  Home with assist PRN, Home with Home health PT, 2-3 sessions per week, S Level 1   PT Equipment Recommendations  Equipment Needed: Yes  Anrdés Krishnan: Angelica  Other: if discharged home would benefit from RW for safe ambulation    Assessment   Body structures, Functions, Activity limitations: Decreased functional mobility ; Decreased safe awareness;Decreased balance;Decreased endurance;Decreased strength  Assessment: Pt. progressing, ambulated 36' with CGA this date with FWW. Pt. appears to be very independent. Anticipate Pt. will improve to be able to return home with PRN A /S of housemate and Home PT. Continues to require skilled PT while hospitalized to improve functional mobility.   Treatment Diagnosis: decreased functional mobility, impaired gait, decreased endurance  Prognosis: Good  PT Education: Goals;PT Role;Plan of Care  Patient Education: d/c recommendations - verbalized understanding  REQUIRES PT FOLLOW UP: Yes  Activity Tolerance Activity Tolerance: Patient limited by endurance  Activity Tolerance: Pt. with CAGLE at baseline     Patient Diagnosis(es): The primary encounter diagnosis was Syncope and collapse. Diagnoses of Confusion and Elevated lactic acid level were also pertinent to this visit. has a past medical history of Arthritis, Bilateral cataracts, Cerebral artery occlusion with cerebral infarction ([facility] [address] Cholelithiasis, Coronary artery disease, Hyperlipidemia, Hypertension, Hypothyroid, IPF (idiopathic pulmonary fibrosis) ([facility] [address] Thyroid nodule, Wears dentures, and Wears glasses. has a past surgical history that includes Coronary angioplasty with stent (07/2003); Thyroidectomy, partial (11/1985); Cholecystectomy, laparoscopic (8/14/2012); Tonsillectomy; [place] tooth extraction; and Humerus fracture surgery (Right, 9/27/2019). Restrictions  Restrictions/Precautions  Restrictions/Precautions: Fall Risk(High fall risk)  Required Braces or Orthoses?: No  Position Activity Restriction  Other position/activity restrictions: Huber Gottlieb is a 80 y.o. female patient presents emergency department for evaluation of syncopal episode and confusion. Patient had a syncopal episode at home and when she awoke she was grossly confused. Patient does seem confused at this time. She thinks she is at the hospital to receive her Covid vaccination. Patient denies any headache, chest pain, shortness of breath, abdominal pain, nausea vomiting or diarrhea. Patient does not remember falling. Patient has pulmonary fibrosis and wears oxygen at home. Subjective   General  Chart Reviewed: Yes  Response To Previous Treatment: Patient with no complaints from previous session. Family / Caregiver Present: No  Subjective  Subjective: Denied pain, agreeable to therapy.   General Comment  Comments: supine in bed upon arrival          Orientation  Orientation  Overall Orientation Status: Within Functional Limits  Cognition      Objective   Bed mobility Initially in place: No     Therapy Time   Individual Concurrent Group Co-treatment   Time In 0815         Time Out 0840         Minutes 25         Timed Code Treatment Minutes: [address], [place], [id number]

## 2021-02-16 NOTE — DISCHARGE SUMMARY
Hospital Medicine Discharge Summary    Patient: Sasha Yates     Gender: female  : 1933   Age: 80 y.o. MRN: [id number]    Admitting Physician: Rachel Avery MD  Discharge Physician: Sim Parsons MD     Code Status: Full Code     Admit Date: 2021   Discharge Date:   2021    Disposition:  Home  Time spent arranging discharge: 35 minutes    Discharge Diagnoses: Active Hospital Problems    Diagnosis Date Noted    Abnormal ECG [R94.31] 02/15/2021     Priority: High    Altered mental status [R41.82] 02/15/2021     Priority: Medium    DM type 2, controlled, with complication ([facility] [U04.1]     Syncope and collapse [R55] 2021    PAF (paroxysmal atrial fibrillation) ([facility] [I48.0] 2020    Arterial ischemic stroke, ICA, right, acute (HCC) [I63.231]     IPF (idiopathic pulmonary fibrosis) ([facility] [J84.112] 2017    CAD in native artery [I25.10]     Essential hypertension [I10]    cva    :     Condition at Discharge:  [address] Course:   CVA: small acute infacrts in frontal lobe, echo negative, carotids < 50% stenosis, cleared for discharge on eliquis per neuro, discharged with home pt and ot    PAF: event monitor on dc,per cards  CAD: no stress or invasive testing per patient, started on imdur fu with cardiology as outpatietn    Discharge Exam:    /74   Pulse 68   Temp 97.6 °F (36.4 °C) (Oral)   Resp 18   Ht 5' (1.524 m)   Wt 99 lb 12.8 oz (45.3 kg)   SpO2 96%   BMI 19.49 kg/m²   General appearance:  Appears comfortable. AAOx3  HEENT: atraumatic, Pupils equal, muscous membranes moist, no masses appreciated  Cardiovascular: Regular rate and rhythm no murmurs appreciated  Respiratory: CTAB no wheezing  Gastrointestinal: Abdomen soft, non-tender, BS+  EXT: no edema  Neurology: no gross focal deficts  Psychiatry: Appropriate affect.  Not agitated  Skin: Warm, dry, no rashes appreciated    Discharge Medications:   Current Discharge Medication List START taking these medications    Details   ferrous sulfate (IRON 325) 325 (65 Fe) MG tablet Take 1 tablet by mouth 2 times daily (with meals)  Qty: 30 tablet, Refills: 3      Cyanocobalamin (VITAMIN B-12) 50 MCG tablet Take 1 tablet by mouth daily  Qty: 30 tablet, Refills: 3           Current Discharge Medication List        Current Discharge Medication List      CONTINUE these medications which have NOT CHANGED    Details   ELIQUIS 2.5 MG TABS tablet TAKE ONE TABLET BY MOUTH TWICE A DAY  Qty: 180 tablet, Refills: 3      rosuvastatin (CRESTOR) 20 MG tablet TAKE ONE TABLET BY MOUTH ONCE NIGHTLY  Qty: 90 tablet, Refills: 3      isosorbide mononitrate (IMDUR) 30 MG extended release tablet TAKE ONE TABLET BY MOUTH DAILY  Qty: 90 tablet, Refills: 3      levothyroxine (SYNTHROID) 50 MCG tablet Take 1 tablet by mouth Daily  Qty: 90 tablet, Refills: 2      folic acid (FOLVITE) 1 MG tablet One BID  Qty: 180 tablet, Refills: 2      metoprolol tartrate (LOPRESSOR) 25 MG tablet TAKE ONE AND ONE-HALF TABLET BY MOUTH TWICE A DAY  Qty: 270 tablet, Refills: 3      OXYGEN Inhale 2 L into the lungs as needed      omeprazole (PRILOSEC) 20 MG capsule Take 20 mg by mouth daily. nitroGLYCERIN (NITROSTAT) 0.4 MG SL tablet DISSOLVE 1 TAB UNDER TONGUE FOR CHEST PAIN - IF PAIN REMAINS AFTER 5 MIN, CALL 911 AND REPEAT DOSE. MAX 3 TABS IN 15 MINUTES  Qty: 25 tablet, Refills: 1    Associated Diagnoses: Essential hypertension; Hyperlipidemia, unspecified hyperlipidemia type      Nintedanib Esylate 100 MG CAPS Take 100 mg by mouth daily  Qty: 90 capsule, Refills: 3      Handicap Placard MISC by Does not apply route Duration: 5 years  Qty: 1 each, Refills: 0      ketoconazole (NIZORAL) 2 % cream Apply topically daily. Qty: 30 g, Refills: 1           Current Discharge Medication List          Labs:  For convenience and continuity at follow-up the following most recent labs are provided:    Lab Results   Component Value Date WBC 5.6 02/16/2021    HGB 10.8 02/16/2021    HCT 34.6 02/16/2021    MCV 82.4 02/16/2021     02/16/2021     02/16/2021    K 4.1 02/16/2021    K 4.4 02/13/2021     02/16/2021    CO2 28 02/16/2021    BUN 14 02/16/2021    CREATININE 1.0 02/16/2021    CALCIUM 9.3 02/16/2021    PHOS 3.0 02/16/2021     03/17/2017    ALKPHOS 87 02/12/2021    ALT 9 02/12/2021    AST 20 02/12/2021    BILITOT 0.3 02/12/2021    BILIDIR <0.2 01/14/2020    LABALBU 3.4 02/12/2021    LDLCALC 92 06/05/2020    TRIG 116 06/05/2020     Lab Results   Component Value Date    INR 1.20 (H) 02/12/2021    INR 0.94 11/18/2019       Radiology:  Echo Complete    Result Date: 2/15/2021 Transthoracic Echocardiography Report (TTE)  Demographics   Patient Name       Sameera Pal   Date of Study      02/15/2021         Gender              Female   Patient Number     [id number]         Date of Birth       09/04/1933   Visit Number       [id number]          Age                 80 year(s)   Accession Number   [id number]         Room Number         0362   Corporate ID       [id number]           Joshua Liu,                                                            [address]   Ordering Physician Tammy Esparza,   Interpreting        Christiano Holbrook MD                 Physician           MD, [facility]  Procedure Type of Study   TTE procedure:ECHOCARDIOGRAM COMPLETE 2D W DOPPLER W COLOR. Procedure Date Date: 02/15/2021 Start: 11:43 AM Study Location: [facility] - Echo Lab Technical Quality: Adequate visualization Indications:Syncope. Patient Status: Routine Height: 60 inches Weight: 99 pounds BSA: 1.38 m2 BMI: 19.33 kg/m2 BP: 152/88 mmHg  Conclusions   Summary  -Normal left ventricle size, wall thickness and systolic function with an  estimated ejection fraction of 65%.  -No regional wall motion abnormalities are seen. -Grade I diastolic dysfunction with normal LV filling pressures. -E/e\"=17.7 .  -Mitral annular calcification is present.  -Severe thickening and calcification of the posterior leaflet of mitral  valve. -Mild to moderate mitral regurgitation is present.  -Aortic valve appears sclerotic but opens adequately. -Mild aortic regurgitation.  -Moderate tricuspid regurgitation.  - KYVO=704 mmHg, which is suggestive of severe pulmonary hypertension.  -The right atrium is mildly dilated. -There is a trivial pericardial effusion noted. 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  -Prominent Eustachian valve and Chiari network in RA.    Signature   ------------------------------------------------------------------  Electronically signed by Betty Bosch MD, [facility] (Interpreting  physician) on 02/15/2021 at 12:52 PM  ------------------------------------------------------------------   Findings   Left Ventricle  Normal left ventricle size, wall thickness and systolic function with an  estimated ejection fraction of 65%. No regional wall motion abnormalities  are seen. Grade I diastolic dysfunction with normal LV filling pressures. E/e\"=17.7 . Mitral Valve  Mitral annular calcification is present. Severe thickening and calcification of the posterior leaflet of mitral  valve. Mild mitral regurgitation is present. Left Atrium  The left atrium is moderately dilated. Aortic Valve  Aortic valve appears sclerotic but opens adequately. Mild to moderate aortic  regurgitation. Aorta  The aortic root is normal in size. Right Ventricle  The right ventricle is normal in size and function. Tricuspid Valve  Tricuspid valve is structurally normal.  Moderate tricuspid regurgitation. CPGG=765 mmHg, which is suggestive of  severe pulmonary hypertension. Right Atrium  The right atrium is mildly dilated. Pulmonic Valve  The pulmonic valve is structurally normal.  Moderate to severe pulmonic regurgitation present. Pericardial Effusion  There is a trivial pericardial effusion noted. Pleural Effusion  No pleural effusion. Miscellaneous  IVC not well visualized.   M-Mode/2D Measurements (cm)   LV Diastolic Dimension: 6.17 cm LV Systolic Dimension: 2 cm  LV Septum Diastolic: 1 cm  LV PW Diastolic: 7.23 cm        AO Root Dimension: 2.7 cm                                  LA Dimension: 2.5 cm                                  LA Area: 12.7 cm2  LVOT: 1.7 cm                    LA volume/Index: 36.9 ml /27 ml/m2  Doppler Measurements   AV Peak Velocity: 153 cm/s    MV Peak E-Wave: 67.5 cm/s  AV Peak Gradient: 9.36 mmHg   MV Peak A-Wave: 137 cm/s  AV Mean Gradient: 6 mmHg      MV E/A Ratio: 0.49  LVOT Peak Velocity: 111 cm/s  AV Area (Continuity):1.58 cm2   TR Velocity:520 cm/s  TR Gradient:108.16 mmHg  Estimated RAP:3 mmHg  Estimated RVSP: 111 mmHg  E' Septal Velocity: 3.92 cm/s  E' Lateral Velocity: 3.7 cm/s  E/E' ratio: 17.7   Aortic Valve   Peak Velocity: 153 cm/s     Mean Velocity: 116 cm/s  Peak Gradient: 9.36 mmHg    Mean Gradient: 6 mmHg  Area (continuity): 1.58 cm2  AV VTI: 35.1 cm  AI P1/2t: 744 msec  Aorta   Aortic Root: 2.7 cm  LVOT Diameter: 1.7 cm      Ct Head Wo Contrast    Result Date: 2/12/2021  EXAMINATION: CT OF THE HEAD WITHOUT CONTRAST  2/12/2021 8:31 pm TECHNIQUE: CT of the head was performed without the administration of intravenous contrast. Dose modulation, iterative reconstruction, and/or weight based adjustment of the mA/kV was utilized to reduce the radiation dose to as low as reasonably achievable. COMPARISON: CT head 11/18/2019. HISTORY: ORDERING SYSTEM PROVIDED HISTORY: syncope TECHNOLOGIST PROVIDED HISTORY: Reason for exam:->syncope Has a \"code stroke\" or \"stroke alert\" been called? ->No Decision Support Exception->Emergency Medical Condition (MA) Reason for Exam: Altered Mental Status (in by [facility] from home where she lives with a friend, apparently pt passed out at home, and then became very confused once she was awake, thinks shes here for 2nd covid vaccine. ) FINDINGS: BRAIN/VENTRICLES: No acute hemorrhage. Periventricular and subcortical hypoattenuation is nonspecific and may be related to microvascular disease. Encephalomalacia in the left frontal lobe again visualized. Adama Paget white differentiation appears maintained given artifact near the skull base and through the posterior fossa. Cerebral volume loss and mild prominence of the ventricles noted. There is no midline shift. Basal cisterns appear patent. ORBITS: Visualized orbits appear unremarkable on this unenhanced exam. SINUSES: Visualized paranasal sinuses appear clear. Visualized mastoid air cells appear clear. SOFT TISSUES/SKULL:   No depressed calvarial fracture identified. No acute hemorrhage or midline shift. Xr Chest Portable    Result Date: 2/12/2021  EXAMINATION: ONE XRAY VIEW OF THE CHEST 2/12/2021 8:21 pm COMPARISON: Chest x-ray 04/04/2019. CT chest 08/26/2020. HISTORY: ORDERING SYSTEM PROVIDED HISTORY: cough TECHNOLOGIST PROVIDED HISTORY: Reason for exam:->cough Reason for Exam: Altered Mental Status Acuity: Unknown Type of Exam: Unknown FINDINGS: Cardiomediastinal silhouette is enlarged and partially obscured by perihilar opacities. No pleural effusion appreciated on this projection. No pneumothorax appreciated on this projection. Coarse interstitial markings consistent with fibrosis again noted. Perihilar opacities.   Pulmonary edema and pneumonia are in the differential.     Ct Chest Pulmonary Embolism W Contrast    Result Date: 2/12/2021 Vl Dup Carotid Bilateral    Result Date: 2/13/2021 Carotid Duplex Study  Demographics   Patient Name       Sonja Perdomo   Date of Study      02/13/2021         Gender              Female   Patient Number     [id number]         Date of Birth       09/04/1933   Visit Number       [id number]          Age                 80 year(s)   Accession Number   [id number]         Room Number         7297   Corporate ID       [id number]           Sonographer         Darrel Garcia                                                            RVT, RDMS, AB,                                                            OB/GYN   Ordering Physician Charan Manzano   Interpreting        [facility]                     R., PA             Physician           Jo Maria MD,                                                            [facility] [place]  Procedure Type of Study:   Cerebral:Carotid, VL CAROTID DUPLEX BILATERAL. Vascular Sonographer Report  Additional Indications:syncope Impressions Right Impression The right internal carotid artery appears to have a <50% diameter reducing stenosis based on velocity criteria. The right vertebral artery demonstrates normal antegrade flow. The right subclavian artery is visualized and demonstrates multiphasic flow. Left Impression The left internal carotid artery appears to have a <50% diameter reducing stenosis based on velocity criteria. The left vertebral artery demonstrates normal antegrade flow. The left subclavian artery is visualized and demonstrates multiphasic flow. The left brachial pressure was not obtained due to IV placement . Conclusions   Summary   -The right internal carotid artery appears to have a <50% diameter reducing  stenosis based on velocity criteria. -The left internal carotid artery appears to have a <50% diameter reducing  stenosis based on velocity criteria. Recommendations   Recommend follow up study in 12 months.    Signature   ------------------------------------------------------------------  Electronically signed by Daniel Eldridge MD, [facility] (Interpreting  physician) on 02/13/2021 at 01:16 PM  ------------------------------------------------------------------  Patient Status:Routine. [address] [place] [address] - Vascular Lab. Technical Quality:Adequate visualization. Plaque   - A plaque was found in the Right ICA. irregular. The plaque characteristics are: moderate severity and heterogeneous texture. - A plaque was found in the Left ICA. irregular. The plaque characteristics are: moderate severity and heterogeneous texture. Velocities are measured in cm/s ; Diameters are measured in mm Carotid Right Measurements +---------------+----+----+-----+----+ ! Location       ! PSV ! EDV ! Angle! RI  ! +---------------+----+----+-----+----+ ! Prox CCA       !60  !9.83!60   !0.84! +---------------+----+----+-----+----+ ! Mid CCA        !54. 1!11. 4!60   !0.79! +---------------+----+----+-----+----+ ! Dist CCA       !54. 1!13. 3! 60   !0.75! +---------------+----+----+-----+----+ ! Prox ICA       !58. 5!8.41! 60   !0.86! +---------------+----+----+-----+----+ ! Mid ICA        !68. 8!15. 7!60   !0.77! +---------------+----+----+-----+----+ ! Dist ICA       ! 86  !22. 1! 60   !0.74! +---------------+----+----+-----+----+ ! Prox ECA       !56.2! ! 42   !    ! +---------------+----+----+-----+----+ ! Vertebral      !35.4!    !60   !    ! +---------------+----+----+-----+----+ ! Prox Subclavian! 58.5!    !60   !    ! +---------------+----+----+-----+----+   - There is antegrade vertebral flow noted on the right side. - Additional Measurements:ICAPSV/CCAPSV 1.59. ICAEDV/CCAEDV 2.25. Carotid Left Measurements +---------------+----+----+-----+----+ ! Location       ! PSV ! EDV ! Angle! RI  ! +---------------+----+----+-----+----+ ! Prox CCA       !80.6!14. 7!60   !0.82! +---------------+----+----+-----+----+ ! Mid CCA        !70.8!13. 3! 60   !0.81! +---------------+----+----+-----+----+ ! Dist CCA       !68. 3!19. 2! 60   !0.72!  +---------------+----+----+-----+----+ !Prox ICA       !88. 6!17. 6! 60   !0.79! +---------------+----+----+-----+----+ ! Mid ICA        !71.4!9.94!60   !0.86! +---------------+----+----+-----+----+ ! Dist ICA       !46. 3!14.9!0    !0.68! +---------------+----+----+-----+----+ ! Prox ECA       ! 85  !    !60   !    ! +---------------+----+----+-----+----+ ! Vertebral      !40.3!    !60   !    ! +---------------+----+----+-----+----+ ! Prox Skinny Muta  !    !60   !    ! +---------------+----+----+-----+----+   - There is antegrade vertebral flow noted on the left side. - Additional Measurements:ICAPSV/CCAPSV 1.25. ICAEDV/CCAEDV 1.27.     Mri Brain Wo Contrast    Result Date: 2/16/2021 EXAMINATION: MRI OF THE BRAIN WITHOUT CONTRAST  2/16/2021 8:44 am TECHNIQUE: Multiplanar multisequence MRI of the brain was performed without the administration of intravenous contrast. COMPARISON: 11/18/2019. HISTORY: ORDERING SYSTEM PROVIDED HISTORY: syncope, diffuse ekg changes, hx of embolic cva TECHNOLOGIST PROVIDED HISTORY: Reason for exam:->syncope, diffuse ekg changes, hx of embolic cva Reason for Exam: Patient with dizziness and an episode of syncope. Acuity: Unknown Type of Exam: Ongoing FINDINGS: INTRACRANIAL STRUCTURES/VENTRICLES: Small acute infarcts are seen within the right frontal lobe along the anterior aspect of the superior frontal gyrus. No mass effect or midline shift. A chronic infarct is seen involving the left frontal lobe. No evidence of an acute intracranial hemorrhage. Areas of T2 FLAIR hyperintensity are seen in the periventricular and subcortical white matter, which are nonspecific, but may represent chronic microvascular ischemic change. There is prominence of the ventricles and sulci due to global parenchymal volume loss. The sellar/suprasellar regions appear unremarkable. The normal signal voids within the major intracranial vessels appear maintained. ORBITS: The visualized portion of the orbits demonstrate no acute abnormality. SINUSES: The visualized paranasal sinuses and mastoid air cells are well aerated. BONES/SOFT TISSUES: The bone marrow signal intensity appears normal. The soft tissues demonstrate no acute abnormality. 1. Small acute infarcts are seen involving the right frontal lobe. No evidence of mass effect or midline shift. 2. Otherwise, no acute intracranial abnormality. 3. Small chronic infarct involving the left frontal lobe. 4. Mild global parenchymal volume loss with chronic microvascular ischemic changes. These results were sent to the [employer] [id number] ([address] [place] [address]) on 2/16/2021 at 9:23 am to be communicated to the referring/covering health care provider/office.          Signed:    Taft Peabody, MD   2/16/2021

## 2021-02-16 NOTE — PROGRESS NOTES
[address]   Cardiology Progress Note     Date: 2/16/2021  Admit Date: 2/12/2021     Reason for consultation:  Syncope, abnormal ECG   Chief Complaint:   Chief Complaint   Patient presents with    Altered Mental Status     in by [address] [facility] [address] [facility] from home where she lives with a friend, apparently pt passed out at home, and then became very confused once she was awake, thinks shes here for 2nd covid vaccine. History of Present Illness: History obtained from patient and medical record. Interval Hx: Today, she is feeling ok. No chest discomfort. On 4L of oxygen. Had at least 9 beats of wide complex tachycardia on monitor yesterday evening, asymptomatic. Patient seen and examined. Clinical notes reviewed. Telemetry reviewed. No new complaints today. No major events overnight. Denies having chest pain, palpitations, shortness of breath, orthopnea/PND, cough, or dizziness at the time of this visit. Past Medical History:  Past Medical History:   Diagnosis Date    Arthritis     Bilateral cataracts 9/17/2015    Cerebral artery occlusion with cerebral infarction ([facility]     Cholelithiasis     Coronary artery disease     Hyperlipidemia     Hypertension     Hypothyroid 4/11/2014    IPF (idiopathic pulmonary fibrosis) ([facility] [address] [facility] 6/12/2017    Thyroid nodule     Wears dentures     upper    Wears glasses         Past Surgical History:    has a past surgical history that includes Coronary angioplasty with stent (07/2003); Thyroidectomy, partial (11/1985); Cholecystectomy, laparoscopic (8/14/2012); Tonsillectomy; [place] tooth extraction; and Humerus fracture surgery (Right, 9/27/2019). Social History:  Reviewed. reports that she quit smoking about 35 years ago. She has never used smokeless tobacco. She reports previous alcohol use. She reports that she does not use drugs. Allergies:   Allergies   Allergen Reactions    Lipitor [Atorvastatin] Hives       Family History: Reviewed. family history includes Cancer in her brother and brother; Coronary Art Dis in her father and mother; Heart Disease in her brother and father; High Cholesterol in her brother; Other in her brother, brother, and mother; Prostate Cancer in her brother; Stroke in her mother. Denies family history of sudden cardiac death, arrhythmia, premature CAD    Home Meds:  Prior to Visit Medications    Medication Sig Taking? Authorizing Provider   ELIQUIS 2.5 MG TABS tablet TAKE ONE TABLET BY MOUTH TWICE A DAY Yes Noé Nunez MD   rosuvastatin (CRESTOR) 20 MG tablet TAKE ONE TABLET BY MOUTH ONCE NIGHTLY Yes Allison Larson MD   isosorbide mononitrate (IMDUR) 30 MG extended release tablet TAKE ONE TABLET BY MOUTH DAILY Yes Noé Nunez MD   levothyroxine (SYNTHROID) 50 MCG tablet Take 1 tablet by mouth Daily Yes Allison Larson MD   folic acid (FOLVITE) 1 MG tablet One BID Yes Allison Larson MD   metoprolol tartrate (LOPRESSOR) 25 MG tablet TAKE ONE AND ONE-HALF TABLET BY MOUTH TWICE A DAY Yes Noé Nunez MD   OXYGEN Inhale 2 L into the lungs as needed Yes Historical Provider, MD   omeprazole (PRILOSEC) 20 MG capsule Take 20 mg by mouth daily. Yes Allison Larson MD   nitroGLYCERIN (NITROSTAT) 0.4 MG SL tablet DISSOLVE 1 TAB UNDER TONGUE FOR CHEST PAIN - IF PAIN REMAINS AFTER 5 MIN, CALL 911 AND REPEAT DOSE. MAX 3 TABS IN 15 MINUTES  Noé Nunez MD   Nintedanib Esylate 100 MG CAPS Take 100 mg by mouth daily  Nils Ramirez MD   Handicap Placard MISC by Does not apply route Duration: 5 years  Nils Ramirez MD   ketoconazole (NIZORAL) 2 % cream Apply topically daily.   Patient not taking: Reported on 1/4/2021  Allison Larson MD        Scheduled Meds:   ferrous sulfate  325 mg Oral BID WC    vitamin B-12  50 mcg Oral Daily    apixaban  2.5 mg Oral BID    isosorbide mononitrate  30 mg Oral Daily    levothyroxine  50 mcg Oral Daily    metoprolol tartrate  25 mg Oral BID  sodium chloride flush  10 mL Intravenous 2 times per day    pantoprazole  40 mg Oral QAM AC    rosuvastatin  20 mg Oral Daily    insulin lispro  0-6 Units Subcutaneous TID WC    insulin lispro  0-3 Units Subcutaneous Nightly     Continuous Infusions:   dextrose       PRN Meds:sodium chloride flush, magnesium hydroxide, acetaminophen, ondansetron, glucose, dextrose, glucagon (rDNA), dextrose, perflutren lipid microspheres     Review of Systems:  · Constitutional: Negative for fever, night sweats, chills, weight changes  · Skin: Negative for rash, pruritus, bleeding, blood clots, or bruising    · HEENT: Negative for vision changes, ringing in the ears, dysphagia, or swollen lymph nodes  · Respiratory: Reviewed in HPI  · Cardiovascular: Reviewed in HPI  · Gastrointestinal: Negative for abdominal pain, N/V/D, constipation, or black/tarry stools  · Genito-Urinary: Negative for dysuria, incontinence, or hematuria  · Musculoskeletal: Negative for joint swelling, muscle pain, or injuries  · Neurological/Psych: Negative for confusion, seizures, headaches, or TIA-like symptoms. No anxiety or depression. Physical Examination:  Vitals:    02/16/21 0045   BP: 131/76   Pulse: 74   Resp: 18   Temp: 97.5 °F (36.4 °C)   SpO2: 96%      No intake/output data recorded. Wt Readings from Last 3 Encounters:   02/15/21 99 lb 12.8 oz (45.3 kg)   10/30/20 107 lb (48.5 kg)   08/27/20 109 lb (49.4 kg)     No intake or output data in the 24 hours ending 02/16/21 0855    Telemetry: Personally Reviewed  - Sinus rhythm, wide complex tachycardia   · Constitutional: Cooperative and in no apparent distress, and appears frail  · Skin: Warm and pink; no pallor, cyanosis, clubbing. + scattered bruising   · HEENT: Symmetric and normocephalic. PERRL, EOM intact. Conjunctiva pink with clear sclera.  Mucus membranes moist. · Cardiovascular: Regular rate and rhythm. S1/S2 present with +2/6 murmur heard loudest to LSB, 2 ICS, no rubs or gallops. Peripheral pulses 2+, capillary refill < 3 seconds. No elevation of JVP. No peripheral edema  · Respiratory: Respirations symmetric and unlabored. Lungs with Velcro type inspiratory crackles   · Gastrointestinal: Abdomen soft and round. Bowel sounds normoactive without tenderness or masses. · Musculoskeletal: Bilateral upper and lower extremity strength with weakness   · Neurologic/Psych: Awake and orientated to person, place and time. Calm affect, appropriate mood    Pertinent labs, diagnostic, device, and imaging results reviewed as a part of this visit    Labs:    BMP:   Recent Labs     21  0802 02/15/21  0512 02/16/21  0536    137 137   K 4.3 4.0 4.1    103 102   CO2 25 27 28   PHOS 2.9 2.9 3.0   BUN 15 16 14   CREATININE 0.9 1.0 1.0   MG 1.80 1.80 1.90     CrCl cannot be calculated (Unknown ideal weight.).    CBC:   Recent Labs     21  0802 02/15/21  0512 02/16/21  0536   WBC 7.5 6.5 5.6   HGB 10.6* 10.8* 10.8*   HCT 34.4* 34.8* 34.6*   MCV 82.6 83.4 82.4    239 227     Thyroid:   Lab Results   Component Value Date    TSH 3.04 2020     Lipids:   Lab Results   Component Value Date    CHOL 181 2020    HDL 66 2020    HDL 72 2010    TRIG 116 2020     LFTS:   Lab Results   Component Value Date    ALT 9 2021    AST 20 2021    ALKPHOS 87 2021    PROT 6.6 2021    PROT 6.5 2012    AGRATIO 1.1 2021    BILITOT 0.3 2021     Cardiac Enzymes:   Lab Results   Component Value Date    CKTOTAL 46 2020    TROPONINI <0.01 2021    TROPONINI 0.01 2021    TROPONINI <0.01 2021     Coags:   Lab Results   Component Value Date    PROTIME 14.0 2021    INR 1.20 2021       EC21  Normal sinus rhythm with sinus arrhythmia  Left anterior fascicular block Nonspecific T wave abnormality  Abnormal ECG  When compared with ECG of 13-FEB-2021 11:16,  T wave inversion no longer evident in Anterior leads  Confirmed by Dayana Heath MD, [facility] ([id number]) on 2/14/2021 9:41:47 PM    ECHO:  2/15/21   Summary   -Normal left ventricle size, wall thickness and systolic function with an   estimated ejection fraction of 65%.   -No regional wall motion abnormalities are seen. -Grade I diastolic dysfunction with normal LV filling pressures. -E/e\"=17.7 .   -Mitral annular calcification is present.   -Severe thickening and calcification of the posterior leaflet of mitral   valve. -Mild to moderate mitral regurgitation is present.   -Aortic valve appears sclerotic but opens adequately. -Mild aortic regurgitation.   -Moderate tricuspid regurgitation.   - USHA=640 mmHg, which is suggestive of severe pulmonary hypertension.   -The right atrium is mildly dilated. -There is a trivial pericardial effusion noted. 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   -Prominent Eustachian valve and Chiari network in RA. Stress Test: 9/2014  Summary    Left ventricular ejection fraction of 75 %.    The LV wall motion is normal.    There is normal isotope uptake at stress and rest. There is no evidence of    myocardial ischemia or scar.         CT chest: 2/12/21  No evidence of pulmonary embolism.       Diffuse bilateral predominantly peripheral interstitial disease with   superimposed paraseptal and minor centrilobular emphysema, stable. CT head: 2/12/21  No acute hemorrhage or midline shift. MRI of brain: 2/16/21  1. Small acute infarcts are seen involving the right frontal lobe.  No   evidence of mass effect or midline shift. 2. Otherwise, no acute intracranial abnormality. 3. Small chronic infarct involving the left frontal lobe. 4. Mild global parenchymal volume loss with chronic microvascular ischemic   changes.      Problem List:   Patient Active Problem List Diagnosis Date Noted    Abnormal ECG 02/15/2021     Priority: High    Altered mental status 02/15/2021     Priority: Medium    Syncope and collapse 02/13/2021    Diarrhea due to malabsorption 06/11/2020    Paroxysmal atrial fibrillation ([facility] [address] 01/16/2020    Asymptomatic stenosis of left vertebral artery 12/10/2019    Stroke, lacunar ([address] 11/19/2019    Cerebrovascular accident (CVA) ([facility] [address]     HTN (hypertension), benign     Dyslipidemia     Closed fracture of right proximal humerus     Tinea corporis 08/28/2018    Itching 01/03/2018    IPF (idiopathic pulmonary fibrosis) ([facility] [address] 06/12/2017    Hypoxemia 06/12/2017    OA (osteoarthritis) of knee 12/13/2013    Hypothyroidism 10/15/2013    Stage 3 chronic kidney disease 10/15/2013    Pemphigoid 07/11/2013    GERD (gastroesophageal reflux disease) 06/12/2012    Coronary atherosclerosis 05/24/2010    CAD in native artery     Hyperlipidemia     Essential hypertension     Thyroid nodule     Tympanic membrane perforation         Assessment and Plan:     Syncope and collapse   ~ etiology unclear   ~ plan for 30 day MCOT at discharge   ~ MRI of brain revealing small acute infarcts     Abnormal ECG   ~ new ECG changes with T wave inversion in inferior leads. ~ Pt would not like to proceed with stress test or cardiac cath. Plan for medical therapy.   ~ Continue Imdur (can titrate as hemodynamics allow). Consider addition of Ranexa if concern for angina.      CAD   ~ remote   ~ continue aspirin and statin     Idiopathic pulmonary fibrosis   ~ per pulmonary   ~ on chronic O2    Altered mental status   ~ resolved   ~ MRI of brain does show small acute infarcts   ~ per neurology     Hypertension   ~ stable     Paroxysmal atrial fibrillation   ~ currently in SR   ~ on Eliquis      Wide complex tachycardia   ~ at least 9 beats yesterday evening, asymptomatic   ~ keep K >4 and Mag >2  ~ on lopressor, will increase with parameters    ~ MCOT at d/c Multiple medical conditions with risk of decompensation. All pertinent information and plan of care discussed with the physician. All questions and concerns were addressed to the patient. Alternatives to my treatment were discussed. I have discussed the above stated plan with patient and the nurse. The patient verbalized understanding and agreed with the plan. Thank you for allowing to us to participate in the care of Valerie Flores.     Tiara Alcazar APRN-CNP  [address]   Office: [phone]

## 2021-02-16 NOTE — CARE COORDINATION
DME order for a rolling walker noted. Patient provided with a rolling walker from [facility] , paper work left on [address] [facility] desk.

## 2021-02-16 NOTE — CARE COORDINATION
Patient discharged on  2/16/21 to home with skilled hhc services via [address] [facility] agency and shyanne Campos by [facility]    All discharge needs met per case management

## 2021-02-16 NOTE — PROGRESS NOTES
[facility] unable to staff @ this time. [facility] has accepted this referral for home care.

## 2021-02-17 ENCOUNTER — CARE COORDINATION (OUTPATIENT)
Dept: CASE MANAGEMENT | Age: 86
End: 2021-02-17

## 2021-02-17 DIAGNOSIS — E11.8 DM TYPE 2, CONTROLLED, WITH COMPLICATION (HCC): Primary | ICD-10-CM

## 2021-02-17 PROCEDURE — 1111F DSCHRG MED/CURRENT MED MERGE: CPT | Performed by: FAMILY MEDICINE

## 2021-02-17 NOTE — CARE COORDINATION
First attempt at 24 hour discharge call, no answer, CTN left  with contact information and request for return call. CTN will continue with outreach call attempts. CTN contacted \"Codi\" with [facility], she will get patient scheduled for start of care. They have received orders and deny the need for further information.

## 2021-02-17 NOTE — CARE COORDINATION
[facility] Transitions Initial Follow Up Call:  Patient's caregiver \"Ileana\" returned CTN call. She reports that patient is doing well sitting up watching TV, O2 on at 2 LPM, O2 sats 97 and 98%. Discussed discharge instructions and reviewed medications, 1111F completed. [facility] has not contacted patient yet, CTN did confirm that they do have orders for start of care. Caregiver will get iron and B-12 supplements when she feels that is safe to leave patient for a short time. Patient will follow up with cardiology 3/9/21. CTN will continue with outreach follow up calls. Call within 2 business days of discharge: Yes    Patient: Danie Suarez Patient : 1933   MRN: [id number]  Reason for Admission: CVA, PAF  Discharge Date: 21 RARS: Readmission Risk Score: 13      Last Discharge [facility]       Complaint Diagnosis Description Type Department Provider    21 Altered Mental Status Syncope and collapse . .. ED to Hosp-Admission (Discharged) (Venkat Bryan) Christiano Peterson MD; [employer]... Spoke with: Juani Galarza Caregiver      Challenges to be reviewed by the provider   Additional needs identified to be addressed with provider No  none    Discussed COVID-19 related testing which was available at this time. Test results were negative. Patient informed of results, if available? No         Method of communication with provider : none    Advance Care Planning:   Does patient have an Advance Directive:  reviewed and current. Was this a readmission? No  Patient stated reason for admission: Syncope and collapse  Patients top risk factors for readmission: functional physical ability and medical condition    Care Transition Nurse (CTN) contacted the caregiver by telephone to perform post hospital discharge assessment. Verified name and  with caregiver as identifiers. Provided introduction to self, and explanation of the CTN role.      CTN reviewed discharge instructions, medical action plan and red flags with caregiver who verbalized understanding. Caregiver given an opportunity to ask questions and does not have any further questions or concerns at this time. Were discharge instructions available to patient? Yes. Reviewed appropriate site of care based on symptoms and resources available to patient including: PCP, Urgent care clinics, Home health and When to call 911. The caregiver agrees to contact the PCP office for questions related to their healthcare. Medication reconciliation was performed with caregiver, who verbalizes understanding of administration of home medications. Advised obtaining a 90-day supply of all daily and as-needed medications. Covid Risk Education    Patient has following risk factors of: diabetes and chronic kidney disease. Education provided regarding infection prevention, and signs and symptoms of COVID-19 and when to seek medical attention with caregiver who verbalized understanding. Discussed exposure protocols and quarantine From CDC: Are you at higher risk for severe illness?   and given an opportunity for questions and concerns. The caregiver agrees to contact the COVID-19 hotline [phone] or PCP office for questions related to COVID-19. For more information on steps you can take to protect yourself, see CDC's How to Protect Yourself     Patient/family/caregiver given information for [facility] and agrees to enroll yes      Discussed follow-up appointments. If no appointment was previously scheduled, appointment scheduling offered: Yes. Is follow up appointment scheduled within 7 days of discharge? No  Non-[facility] follow up appointment(s):     Plan for follow-up call in 5-7 days based on severity of symptoms and risk factors. Plan for next call: symptom management-., self management-. and follow up appointment-. CTN provided contact information for future needs.           Non-face-to-face services provided:  Obtained and reviewed discharge summary and/or continuity of care documents    Care Transitions 24 Hour Call    Do you have any ongoing symptoms?: No  Do you have a copy of your discharge instructions?: Yes  Do you have all of your prescriptions and are they filled?: No  Have you been contacted by a [facility] Pharmacist?: No  Have you scheduled your follow up appointment?: Yes  How are you going to get to your appointment?: Car - family or friend to transport  Were you discharged with any Home Care or Post Acute Services: Yes  Post Acute Services: [address] you feel like you have everything you need to keep you well at home?: No  Care Transitions Interventions         Follow Up  Future Appointments   Date Time Provider Jose Juan Vaughan   3/9/2021  1:15 PM Palomo Tipton MD [address]   4/6/2021  2:30 PM BIJAL Yang - CNP [facility]       Grecia Joseph RN

## 2021-02-19 ENCOUNTER — TELEPHONE (OUTPATIENT)
Dept: FAMILY MEDICINE CLINIC | Age: 86
End: 2021-02-19

## 2021-02-19 PROCEDURE — 93228 REMOTE 30 DAY ECG REV/REPORT: CPT | Performed by: INTERNAL MEDICINE

## 2021-02-24 ENCOUNTER — CARE COORDINATION (OUTPATIENT)
Dept: CASE MANAGEMENT | Age: 86
End: 2021-02-24

## 2021-03-09 ENCOUNTER — VIRTUAL VISIT (OUTPATIENT)
Dept: CARDIOLOGY CLINIC | Age: 86
End: 2021-03-09

## 2021-03-09 DIAGNOSIS — J84.9 INTERSTITIAL LUNG DISEASE (HCC): ICD-10-CM

## 2021-03-09 DIAGNOSIS — I48.0 PAROXYSMAL ATRIAL FIBRILLATION (HCC): ICD-10-CM

## 2021-03-09 DIAGNOSIS — I25.10 CAD IN NATIVE ARTERY: ICD-10-CM

## 2021-03-09 DIAGNOSIS — I48.0 PAF (PAROXYSMAL ATRIAL FIBRILLATION) (HCC): ICD-10-CM

## 2021-03-09 DIAGNOSIS — E78.5 HYPERLIPIDEMIA, UNSPECIFIED HYPERLIPIDEMIA TYPE: ICD-10-CM

## 2021-03-09 DIAGNOSIS — I10 ESSENTIAL HYPERTENSION: Primary | ICD-10-CM

## 2021-03-09 DIAGNOSIS — R94.31 ABNORMAL EKG: ICD-10-CM

## 2021-03-09 ASSESSMENT — ENCOUNTER SYMPTOMS
SHORTNESS OF BREATH: 1
CHEST TIGHTNESS: 0
COUGH: 0
CHOKING: 0

## 2021-03-09 NOTE — PROGRESS NOTES
Subjective:      Patient ID: Viridiana Kim is a 80 y.o. female. Hypertension  Associated symptoms include shortness of breath. Pertinent negatives include no chest pain or palpitations. Coronary Artery Disease  Symptoms include shortness of breath. Pertinent negatives include no chest pain, chest tightness, dizziness, leg swelling or palpitations. Shortness of Breath  Pertinent negatives include no chest pain or leg swelling. Her past medical history is significant for CAD. Former pt of Dr Susan Florentino. F/U HTN/CAD/sob/interstitial lung disease/hyperlipdemia. Requested and agreed to phone visit. Home hospice. On 2 liters. Breathing remains an issue. She denies chest pain. BP good at home. No pnd or orthopnea  No palp/tachycardia/syncope. No edema.  Follows with Dr Chery Cagle    Past Medical History:   Diagnosis Date    Arthritis     Bilateral cataracts 9/17/2015    [facility]erebral [facility] occlusion [facility] [address]     Cholelithiasis     Coronary artery disease     DM type 2, controlled, with complication ([address]     Hyperlipidemia     Hypertension     Hypothyroid 4/11/2014    IPF (idiopathic pulmonary fibrosis) ([facility] [address] 6/12/2017    Thyroid nodule     Wears dentures     upper    Wears glasses      Past Surgical History:   Procedure Laterality Date    CHOLECYSTECTOMY, LAPAROSCOPIC  8/14/2012    MULTIPORT ROBOTIC ASSISTED LAPAROSCOPIC CHOLECYSTECTOMY WITH    CORONARY ANGIOPLASTY WITH STENT PLACEMENT  07/2003    HUMERUS FRACTURE SURGERY Right 9/27/2019    OPEN REDUCTION INTERNAL FIXATION RIGHT PROXIMAL HUMERUS WITH C-ARM performed by Mehnaz Soliz MD at [address] [facility], PARTIAL  11/1985    parathyroidectomy    TONSILLECTOMY      WISDOM TOOTH EXTRACTION       Social History     Socioeconomic History    Marital status: Single     Spouse name: Not on file    Number of children: 0    Years of education: Not on file    Highest education level: Not on file   Occupational History    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[address] [employer] Financial resource strain: Not on file    Food insecurity     Worry: Not on file     Inability: Not on file    Transportation needs     Medical: Not on file     Non-medical: Not on file   Tobacco Use    Smoking status: Former Smoker     Quit date: 1985     Years since quittin.5    Smokeless tobacco: Never Used   Substance and Sexual Activity    Alcohol use: Not Currently    Drug use: No    Sexual activity: Not Currently   Lifestyle    Physical activity     Days per week: Not on file     Minutes per session: Not on file    Stress: Not on file   Relationships    Social connections     Talks on phone: Not on file     Gets together: Not on file     Attends [employer] service: Not on file     Active member of club or organization: Not on file     Attends meetings of clubs or organizations: Not on file     Relationship status: Not on file    Intimate partner violence     Fear of current or ex partner: Not on file     Emotionally abused: Not on file     Physically abused: Not on file     Forced sexual activity: Not on file   Other Topics Concern    Not on file   Social History Narrative    Lives with room mate    Retired    Travels and golf      travelfox reviewed. Denies FH cardiac issues. Wt 107    Review of Systems   Constitutional: Negative for activity change, appetite change and fatigue. Respiratory: Positive for shortness of breath. Negative for cough, choking and chest tightness. Cardiovascular: Negative for chest pain, palpitations and leg swelling. Denies PND or orthopnea. No tachycardia or syncope. Neurological: Negative for dizziness, syncope and light-headedness. Psychiatric/Behavioral: Negative for agitation, behavioral problems and confusion. All other systems reviewed and are negative. Assessment:       Diagnosis Orders   1. Essential hypertension     2. CAD in native artery     3.  Paroxysmal atrial fibrillation ([facility] [address]     4. Interstitial lung disease ([facility] [address]     5. Hyperlipidemia, unspecified hyperlipidemia type             Plan:      Requested and agreed to phone visit. Now on home hospice. CV  stable. Neuro unchanged and at baseline after multi distribution ischemic stroke 11/19. Breathing issues. Follow with Pulmonary. BP good. No chest pain. Lipids per PCP. 30 day monitor showing pafib. Eliquis 2.5 mg bid. No bleeding issues. Reviewed previous records and testing. No changes. Continue to monitor. Will leave follow up open ended.

## 2021-06-01 RX ORDER — LEVOTHYROXINE SODIUM 0.05 MG/1
50 TABLET ORAL DAILY
Qty: 90 TABLET | Refills: 2 | Status: SHIPPED | OUTPATIENT
Start: 2021-06-01

## (undated) DEVICE — ELECTRODE PT RET AD L9FT HI MOIST COND ADH HYDRGEL CORDED

## (undated) DEVICE — GLOVE SURG SZ 6 L12IN FNGR THK87MIL WHT LTX FREE

## (undated) DEVICE — DRILL BIT AO FITTING

## (undated) DEVICE — INTENDED TO SUPPORT AND MAINTAIN THE POSITION OF AN ANESTHETIZED PATIENT DURING SURGERY: Brand: ERIN BEACH CHAIR FACE MASK

## (undated) DEVICE — COVER LT HNDL BLU PLAS

## (undated) DEVICE — DRESSING,GAUZE,XEROFORM,CURAD,5"X9",ST: Brand: CURAD

## (undated) DEVICE — DRESSING FOAM W10XL20CM ANTIMIC SELF ADH SAFETAC TECHNOLOGY

## (undated) DEVICE — Z DISCONTINUED USE 2716304 SUTURE STRATAFIX SPRL SZ 3-0 L12IN ABSRB UD FS-1 L24MM 3/8

## (undated) DEVICE — STRIP,CLOSURE,WOUND,MEDI-STRIP,1/2X4: Brand: MEDLINE

## (undated) DEVICE — 3M™ STERI-STRIP™ COMPOUND BENZOIN TINCTURE 40 BAGS/CARTON 4 CARTONS/CASE C1544: Brand: 3M™ STERI-STRIP™

## (undated) DEVICE — CANISTER, RIGID, 1200CC: Brand: MEDLINE INDUSTRIES, INC.

## (undated) DEVICE — CHLORAPREP 26ML ORANGE

## (undated) DEVICE — DRAPE C ARM UNIV W41XL74IN CLR PLAS XR VELC CLSR POLY STRP

## (undated) DEVICE — PAD,ABDOMINAL,8"X7.5",STERILE,LF,1/PK: Brand: MEDLINE

## (undated) DEVICE — GLOVE SURG SZ 6 L12IN FNGR THK87MIL DK GRN LTX FREE ISOLEX

## (undated) DEVICE — SUTURE VCRL SZ 0 L18IN ABSRB UD L36MM CT-1 1/2 CIR J840D

## (undated) DEVICE — SOLUTION IV IRRIG POUR BRL 0.9% SODIUM CHL 2F7124

## (undated) DEVICE — GOWN SIRUS NONREIN XL W/TWL: Brand: MEDLINE INDUSTRIES, INC.

## (undated) DEVICE — SPONGE LAP W18XL18IN WHT COT 4 PLY FLD STRUNG RADPQ DISP ST

## (undated) DEVICE — DRAPE,U/SHT,SPLIT,FILM,60X84,STERILE: Brand: MEDLINE

## (undated) DEVICE — DRAPE,SPLIT ,77X120: Brand: MEDLINE

## (undated) DEVICE — GLOVE SURG SZ 8 L12IN FNGR THK87MIL WHT LTX FREE

## (undated) DEVICE — KIT OR ROOM TURNOVER W/STRAP

## (undated) DEVICE — SHEET,DRAPE,53X77,STERILE: Brand: MEDLINE

## (undated) DEVICE — SYRINGE MED 10ML TRNSLUC BRL PLUNG BLK MRK POLYPR CTRL

## (undated) DEVICE — SUTURE MCRYL SZ 4-0 L18IN ABSRB UD L19MM PS-2 3/8 CIR PRIM Y496G

## (undated) DEVICE — 3M™ STERI-DRAPE™ U-DRAPE 1015: Brand: STERI-DRAPE™

## (undated) DEVICE — DRILL BIT  TI LOCKING, MEDIUM,: Brand: AXSOS

## (undated) DEVICE — NEEDLE HYPO 22GA L1 1/2IN PIVOTING SHLD FOR LUERLOCK SYR

## (undated) DEVICE — GOWN SIRUS NONREIN LG W/TWL: Brand: MEDLINE INDUSTRIES, INC.

## (undated) DEVICE — Z DISCONTINUED USE 2275686 GLOVE SURG SZ 8 L12IN FNGR THK13MIL WHT ISOLEX POLYISOPRENE

## (undated) DEVICE — MAJOR SET UP PK

## (undated) DEVICE — SUTURE VCRL SZ 3-0 L18IN ABSRB UD L26MM SH 1/2 CIR J864D

## (undated) DEVICE — SUTURE VCRL SZ 2-0 L18IN ABSRB UD CT-1 L36MM 1/2 CIR J839D

## (undated) DEVICE — PADDING UNDERCAST W4INXL4YD 100% COT CRIMPED FINISH WBRL II